# Patient Record
Sex: FEMALE | Race: WHITE | Employment: UNEMPLOYED
[De-identification: names, ages, dates, MRNs, and addresses within clinical notes are randomized per-mention and may not be internally consistent; named-entity substitution may affect disease eponyms.]

---

## 2017-02-06 ENCOUNTER — OFFICE VISIT (OUTPATIENT)
Dept: FAMILY MEDICINE CLINIC | Facility: CLINIC | Age: 72
End: 2017-02-06

## 2017-02-06 VITALS
DIASTOLIC BLOOD PRESSURE: 80 MMHG | TEMPERATURE: 98 F | HEART RATE: 82 BPM | SYSTOLIC BLOOD PRESSURE: 130 MMHG | WEIGHT: 181.2 LBS | BODY MASS INDEX: 33.34 KG/M2 | HEIGHT: 62 IN | OXYGEN SATURATION: 96 %

## 2017-02-06 DIAGNOSIS — E78.5 HYPERLIPIDEMIA, UNSPECIFIED HYPERLIPIDEMIA TYPE: ICD-10-CM

## 2017-02-06 DIAGNOSIS — Z79.4 TYPE 2 DIABETES MELLITUS WITH COMPLICATION, WITH LONG-TERM CURRENT USE OF INSULIN (HCC): Primary | ICD-10-CM

## 2017-02-06 DIAGNOSIS — Z12.11 COLON CANCER SCREENING: ICD-10-CM

## 2017-02-06 DIAGNOSIS — Z12.11 SCREENING FOR COLON CANCER: ICD-10-CM

## 2017-02-06 DIAGNOSIS — F41.9 ANXIETY: ICD-10-CM

## 2017-02-06 DIAGNOSIS — J44.9 CHRONIC OBSTRUCTIVE PULMONARY DISEASE, UNSPECIFIED COPD TYPE (HCC): ICD-10-CM

## 2017-02-06 DIAGNOSIS — M85.80 OTHER SPECIFIED DISORDERS OF BONE DENSITY AND STRUCTURE, UNSPECIFIED SITE: ICD-10-CM

## 2017-02-06 DIAGNOSIS — E11.8 TYPE 2 DIABETES MELLITUS WITH COMPLICATION, WITH LONG-TERM CURRENT USE OF INSULIN (HCC): Primary | ICD-10-CM

## 2017-02-06 DIAGNOSIS — M19.90 OSTEOARTHRITIS, UNSPECIFIED OSTEOARTHRITIS TYPE, UNSPECIFIED SITE: ICD-10-CM

## 2017-02-06 PROCEDURE — 1036F TOBACCO NON-USER: CPT | Performed by: PHYSICIAN ASSISTANT

## 2017-02-06 PROCEDURE — 1090F PRES/ABSN URINE INCON ASSESS: CPT | Performed by: PHYSICIAN ASSISTANT

## 2017-02-06 PROCEDURE — 3045F PR MOST RECENT HEMOGLOBIN A1C LEVEL 7.0-9.0%: CPT | Performed by: PHYSICIAN ASSISTANT

## 2017-02-06 PROCEDURE — 3014F SCREEN MAMMO DOC REV: CPT | Performed by: PHYSICIAN ASSISTANT

## 2017-02-06 PROCEDURE — 3017F COLORECTAL CA SCREEN DOC REV: CPT | Performed by: PHYSICIAN ASSISTANT

## 2017-02-06 PROCEDURE — 4040F PNEUMOC VAC/ADMIN/RCVD: CPT | Performed by: PHYSICIAN ASSISTANT

## 2017-02-06 PROCEDURE — G8419 CALC BMI OUT NRM PARAM NOF/U: HCPCS | Performed by: PHYSICIAN ASSISTANT

## 2017-02-06 PROCEDURE — 1123F ACP DISCUSS/DSCN MKR DOCD: CPT | Performed by: PHYSICIAN ASSISTANT

## 2017-02-06 PROCEDURE — 3023F SPIROM DOC REV: CPT | Performed by: PHYSICIAN ASSISTANT

## 2017-02-06 PROCEDURE — G8926 SPIRO NO PERF OR DOC: HCPCS | Performed by: PHYSICIAN ASSISTANT

## 2017-02-06 PROCEDURE — 99214 OFFICE O/P EST MOD 30 MIN: CPT | Performed by: PHYSICIAN ASSISTANT

## 2017-02-06 PROCEDURE — G8400 PT W/DXA NO RESULTS DOC: HCPCS | Performed by: PHYSICIAN ASSISTANT

## 2017-02-06 PROCEDURE — G8427 DOCREV CUR MEDS BY ELIG CLIN: HCPCS | Performed by: PHYSICIAN ASSISTANT

## 2017-02-06 PROCEDURE — G8484 FLU IMMUNIZE NO ADMIN: HCPCS | Performed by: PHYSICIAN ASSISTANT

## 2017-02-06 RX ORDER — LORAZEPAM 0.5 MG/1
0.5 TABLET ORAL EVERY 8 HOURS PRN
Qty: 30 TABLET | Refills: 2 | Status: SHIPPED | OUTPATIENT
Start: 2017-02-06 | End: 2017-09-18 | Stop reason: SDUPTHER

## 2017-02-06 RX ORDER — TRAMADOL HYDROCHLORIDE 50 MG/1
TABLET ORAL
Qty: 30 TABLET | Refills: 2 | Status: SHIPPED | OUTPATIENT
Start: 2017-02-06 | End: 2017-09-18 | Stop reason: SDUPTHER

## 2017-02-06 ASSESSMENT — ENCOUNTER SYMPTOMS
BLURRED VISION: 0
DIARRHEA: 0
CHEST TIGHTNESS: 0
NAUSEA: 0
ABDOMINAL PAIN: 0
VOMITING: 0
SHORTNESS OF BREATH: 0
EYE ITCHING: 0
EYE DISCHARGE: 0
RHINORRHEA: 0
SORE THROAT: 0
COUGH: 0
VISUAL CHANGE: 0
SINUS PRESSURE: 0

## 2017-02-06 ASSESSMENT — PATIENT HEALTH QUESTIONNAIRE - PHQ9
2. FEELING DOWN, DEPRESSED OR HOPELESS: 0
SUM OF ALL RESPONSES TO PHQ QUESTIONS 1-9: 0
1. LITTLE INTEREST OR PLEASURE IN DOING THINGS: 0
SUM OF ALL RESPONSES TO PHQ9 QUESTIONS 1 & 2: 0

## 2017-02-27 ENCOUNTER — TELEPHONE (OUTPATIENT)
Dept: FAMILY MEDICINE CLINIC | Facility: CLINIC | Age: 72
End: 2017-02-27

## 2017-03-28 DIAGNOSIS — Z12.11 COLON CANCER SCREENING: Primary | ICD-10-CM

## 2017-03-29 ENCOUNTER — TELEPHONE (OUTPATIENT)
Dept: FAMILY MEDICINE CLINIC | Age: 72
End: 2017-03-29

## 2017-03-29 DIAGNOSIS — Z12.11 COLON CANCER SCREENING: Primary | ICD-10-CM

## 2017-04-21 DIAGNOSIS — F41.9 ANXIETY: ICD-10-CM

## 2017-04-21 DIAGNOSIS — M85.80 OTHER SPECIFIED DISORDERS OF BONE DENSITY AND STRUCTURE, UNSPECIFIED SITE: ICD-10-CM

## 2017-04-21 DIAGNOSIS — J44.9 CHRONIC OBSTRUCTIVE PULMONARY DISEASE, UNSPECIFIED COPD TYPE (HCC): ICD-10-CM

## 2017-04-21 DIAGNOSIS — E78.5 HYPERLIPIDEMIA, UNSPECIFIED HYPERLIPIDEMIA TYPE: ICD-10-CM

## 2017-04-21 DIAGNOSIS — E11.8 TYPE 2 DIABETES MELLITUS WITH COMPLICATION, WITH LONG-TERM CURRENT USE OF INSULIN (HCC): ICD-10-CM

## 2017-04-21 DIAGNOSIS — Z79.4 TYPE 2 DIABETES MELLITUS WITH COMPLICATION, WITH LONG-TERM CURRENT USE OF INSULIN (HCC): ICD-10-CM

## 2017-04-21 DIAGNOSIS — Z12.11 COLON CANCER SCREENING: ICD-10-CM

## 2017-04-21 DIAGNOSIS — M19.90 OSTEOARTHRITIS, UNSPECIFIED OSTEOARTHRITIS TYPE, UNSPECIFIED SITE: ICD-10-CM

## 2017-04-21 LAB
ALBUMIN SERPL-MCNC: 4 G/DL
ALP BLD-CCNC: 59 U/L
ALT SERPL-CCNC: 21 U/L
AST SERPL-CCNC: 11 U/L
BASOPHILS ABSOLUTE: 0.1 /ΜL
BASOPHILS RELATIVE PERCENT: 1 %
BILIRUB SERPL-MCNC: 0.2 MG/DL (ref 0.1–1.4)
BUN BLDV-MCNC: 22 MG/DL
CALCIUM SERPL-MCNC: 8.7 MG/DL
CHLORIDE BLD-SCNC: 108 MMOL/L
CHOLESTEROL, TOTAL: 182 MG/DL
CHOLESTEROL/HDL RATIO: 4.23
CO2: 22 MMOL/L
CREAT SERPL-MCNC: 0.93 MG/DL
EOSINOPHILS ABSOLUTE: 0.2 /ΜL
EOSINOPHILS RELATIVE PERCENT: 2.5 %
GFR CALCULATED: 60
GLUCOSE BLD-MCNC: 211 MG/DL
HBA1C MFR BLD: 8.1 %
HCT VFR BLD CALC: 41.1 % (ref 36–46)
HDLC SERPL-MCNC: 43 MG/DL (ref 35–70)
HEMOGLOBIN: 13.9 G/DL (ref 12–16)
LDL CHOLESTEROL CALCULATED: 84 MG/DL (ref 0–160)
LYMPHOCYTES ABSOLUTE: 3.2 /ΜL
LYMPHOCYTES RELATIVE PERCENT: 39 %
MCH RBC QN AUTO: 31.6 PG
MCHC RBC AUTO-ENTMCNC: 33.8 G/DL
MCV RBC AUTO: 93.5 FL
MONOCYTES ABSOLUTE: 0.6 /ΜL
MONOCYTES RELATIVE PERCENT: 7.5 %
NEUTROPHILS ABSOLUTE: 4.1 /ΜL
NEUTROPHILS RELATIVE PERCENT: 49.7 %
PDW BLD-RTO: 14 %
PLATELET # BLD: 251 K/ΜL
PMV BLD AUTO: 8 FL
POTASSIUM SERPL-SCNC: 4.3 MMOL/L
RBC # BLD: 4.4 10^6/ΜL
SODIUM BLD-SCNC: 139 MMOL/L
TOTAL PROTEIN: 7.1
TRIGL SERPL-MCNC: 273 MG/DL
TSH SERPL DL<=0.05 MIU/L-ACNC: 1.64 UIU/ML
VLDLC SERPL CALC-MCNC: 54 MG/DL
WBC # BLD: 8.2 10^3/ML

## 2017-06-12 ENCOUNTER — OFFICE VISIT (OUTPATIENT)
Dept: FAMILY MEDICINE CLINIC | Age: 72
End: 2017-06-12
Payer: MEDICARE

## 2017-06-12 VITALS
HEIGHT: 62 IN | SYSTOLIC BLOOD PRESSURE: 126 MMHG | BODY MASS INDEX: 32 KG/M2 | OXYGEN SATURATION: 96 % | TEMPERATURE: 98.1 F | HEART RATE: 74 BPM | WEIGHT: 173.9 LBS | DIASTOLIC BLOOD PRESSURE: 82 MMHG

## 2017-06-12 DIAGNOSIS — F41.9 ANXIETY: ICD-10-CM

## 2017-06-12 DIAGNOSIS — Z23 NEED FOR STREPTOCOCCUS PNEUMONIAE VACCINATION: Primary | ICD-10-CM

## 2017-06-12 DIAGNOSIS — J44.9 CHRONIC OBSTRUCTIVE PULMONARY DISEASE, UNSPECIFIED COPD TYPE (HCC): ICD-10-CM

## 2017-06-12 DIAGNOSIS — E78.5 HYPERLIPIDEMIA, UNSPECIFIED HYPERLIPIDEMIA TYPE: ICD-10-CM

## 2017-06-12 PROCEDURE — G8417 CALC BMI ABV UP PARAM F/U: HCPCS | Performed by: PHYSICIAN ASSISTANT

## 2017-06-12 PROCEDURE — 1036F TOBACCO NON-USER: CPT | Performed by: PHYSICIAN ASSISTANT

## 2017-06-12 PROCEDURE — G8400 PT W/DXA NO RESULTS DOC: HCPCS | Performed by: PHYSICIAN ASSISTANT

## 2017-06-12 PROCEDURE — 3023F SPIROM DOC REV: CPT | Performed by: PHYSICIAN ASSISTANT

## 2017-06-12 PROCEDURE — 90670 PCV13 VACCINE IM: CPT | Performed by: PHYSICIAN ASSISTANT

## 2017-06-12 PROCEDURE — G8926 SPIRO NO PERF OR DOC: HCPCS | Performed by: PHYSICIAN ASSISTANT

## 2017-06-12 PROCEDURE — 4040F PNEUMOC VAC/ADMIN/RCVD: CPT | Performed by: PHYSICIAN ASSISTANT

## 2017-06-12 PROCEDURE — 1123F ACP DISCUSS/DSCN MKR DOCD: CPT | Performed by: PHYSICIAN ASSISTANT

## 2017-06-12 PROCEDURE — 3046F HEMOGLOBIN A1C LEVEL >9.0%: CPT | Performed by: PHYSICIAN ASSISTANT

## 2017-06-12 PROCEDURE — 3014F SCREEN MAMMO DOC REV: CPT | Performed by: PHYSICIAN ASSISTANT

## 2017-06-12 PROCEDURE — 99214 OFFICE O/P EST MOD 30 MIN: CPT | Performed by: PHYSICIAN ASSISTANT

## 2017-06-12 PROCEDURE — 1090F PRES/ABSN URINE INCON ASSESS: CPT | Performed by: PHYSICIAN ASSISTANT

## 2017-06-12 PROCEDURE — G0009 ADMIN PNEUMOCOCCAL VACCINE: HCPCS | Performed by: PHYSICIAN ASSISTANT

## 2017-06-12 PROCEDURE — G8427 DOCREV CUR MEDS BY ELIG CLIN: HCPCS | Performed by: PHYSICIAN ASSISTANT

## 2017-06-12 PROCEDURE — 3017F COLORECTAL CA SCREEN DOC REV: CPT | Performed by: PHYSICIAN ASSISTANT

## 2017-06-12 ASSESSMENT — ENCOUNTER SYMPTOMS
CHEST TIGHTNESS: 0
COUGH: 0
NAUSEA: 0
VOMITING: 0
RHINORRHEA: 0
EYE DISCHARGE: 0
SHORTNESS OF BREATH: 0
SORE THROAT: 0
BLURRED VISION: 0
ABDOMINAL PAIN: 0
DIARRHEA: 0
EYE ITCHING: 0
SINUS PRESSURE: 0
VISUAL CHANGE: 0

## 2017-09-18 ENCOUNTER — OFFICE VISIT (OUTPATIENT)
Dept: FAMILY MEDICINE CLINIC | Age: 72
End: 2017-09-18
Payer: MEDICARE

## 2017-09-18 VITALS
TEMPERATURE: 97 F | SYSTOLIC BLOOD PRESSURE: 130 MMHG | DIASTOLIC BLOOD PRESSURE: 80 MMHG | HEART RATE: 80 BPM | OXYGEN SATURATION: 97 % | HEIGHT: 61 IN | BODY MASS INDEX: 33.04 KG/M2 | WEIGHT: 175 LBS

## 2017-09-18 DIAGNOSIS — Z12.31 ENCOUNTER FOR SCREENING MAMMOGRAM FOR MALIGNANT NEOPLASM OF BREAST: ICD-10-CM

## 2017-09-18 DIAGNOSIS — E78.5 HYPERLIPIDEMIA, UNSPECIFIED HYPERLIPIDEMIA TYPE: ICD-10-CM

## 2017-09-18 DIAGNOSIS — Z79.4 TYPE 2 DIABETES MELLITUS WITH COMPLICATION, WITH LONG-TERM CURRENT USE OF INSULIN (HCC): Primary | ICD-10-CM

## 2017-09-18 DIAGNOSIS — F41.9 ANXIETY: ICD-10-CM

## 2017-09-18 DIAGNOSIS — E11.8 TYPE 2 DIABETES MELLITUS WITH COMPLICATION, WITH LONG-TERM CURRENT USE OF INSULIN (HCC): Primary | ICD-10-CM

## 2017-09-18 DIAGNOSIS — J44.9 CHRONIC OBSTRUCTIVE PULMONARY DISEASE, UNSPECIFIED COPD TYPE (HCC): ICD-10-CM

## 2017-09-18 DIAGNOSIS — Z12.39 SCREENING FOR BREAST CANCER: ICD-10-CM

## 2017-09-18 DIAGNOSIS — Z79.899 CHRONIC PRESCRIPTION BENZODIAZEPINE USE: ICD-10-CM

## 2017-09-18 DIAGNOSIS — M19.90 OSTEOARTHRITIS, UNSPECIFIED OSTEOARTHRITIS TYPE, UNSPECIFIED SITE: ICD-10-CM

## 2017-09-18 DIAGNOSIS — Z23 NEED FOR INFLUENZA VACCINATION: ICD-10-CM

## 2017-09-18 DIAGNOSIS — Z13.820 SCREENING FOR OSTEOPOROSIS: ICD-10-CM

## 2017-09-18 DIAGNOSIS — E11.9 ENCOUNTER FOR DIABETIC FOOT EXAM (HCC): ICD-10-CM

## 2017-09-18 LAB
AMPHETAMINE SCREEN, URINE: NORMAL
BARBITURATE SCREEN, URINE: NORMAL
BENZODIAZEPINE SCREEN, URINE: NORMAL
COCAINE METABOLITE SCREEN URINE: NORMAL
CREATININE URINE POCT: 50
HBA1C MFR BLD: 7.7 %
MDMA URINE: NORMAL
METHADONE SCREEN, URINE: NORMAL
METHAMPHETAMINE, URINE: NORMAL
MICROALBUMIN/CREAT 24H UR: 30 MG/G{CREAT}
MICROALBUMIN/CREAT UR-RTO: ABNORMAL
OPIATE SCREEN URINE: NORMAL
OXYCODONE SCREEN URINE: NORMAL
PHENCYCLIDINE SCREEN URINE: NORMAL
PROPOXYPHENE SCREEN, URINE: NORMAL
THC: NORMAL
TRICYCLIC ANTIDEPRESSANTS, UR: NORMAL

## 2017-09-18 PROCEDURE — G0008 ADMIN INFLUENZA VIRUS VAC: HCPCS | Performed by: PHYSICIAN ASSISTANT

## 2017-09-18 PROCEDURE — 3014F SCREEN MAMMO DOC REV: CPT | Performed by: PHYSICIAN ASSISTANT

## 2017-09-18 PROCEDURE — 3023F SPIROM DOC REV: CPT | Performed by: PHYSICIAN ASSISTANT

## 2017-09-18 PROCEDURE — 1123F ACP DISCUSS/DSCN MKR DOCD: CPT | Performed by: PHYSICIAN ASSISTANT

## 2017-09-18 PROCEDURE — G8400 PT W/DXA NO RESULTS DOC: HCPCS | Performed by: PHYSICIAN ASSISTANT

## 2017-09-18 PROCEDURE — 3017F COLORECTAL CA SCREEN DOC REV: CPT | Performed by: PHYSICIAN ASSISTANT

## 2017-09-18 PROCEDURE — 99214 OFFICE O/P EST MOD 30 MIN: CPT | Performed by: PHYSICIAN ASSISTANT

## 2017-09-18 PROCEDURE — G8427 DOCREV CUR MEDS BY ELIG CLIN: HCPCS | Performed by: PHYSICIAN ASSISTANT

## 2017-09-18 PROCEDURE — 90662 IIV NO PRSV INCREASED AG IM: CPT | Performed by: PHYSICIAN ASSISTANT

## 2017-09-18 PROCEDURE — 3046F HEMOGLOBIN A1C LEVEL >9.0%: CPT | Performed by: PHYSICIAN ASSISTANT

## 2017-09-18 PROCEDURE — 1036F TOBACCO NON-USER: CPT | Performed by: PHYSICIAN ASSISTANT

## 2017-09-18 PROCEDURE — 82044 UR ALBUMIN SEMIQUANTITATIVE: CPT | Performed by: PHYSICIAN ASSISTANT

## 2017-09-18 PROCEDURE — 1090F PRES/ABSN URINE INCON ASSESS: CPT | Performed by: PHYSICIAN ASSISTANT

## 2017-09-18 PROCEDURE — 4040F PNEUMOC VAC/ADMIN/RCVD: CPT | Performed by: PHYSICIAN ASSISTANT

## 2017-09-18 PROCEDURE — G8417 CALC BMI ABV UP PARAM F/U: HCPCS | Performed by: PHYSICIAN ASSISTANT

## 2017-09-18 PROCEDURE — G8926 SPIRO NO PERF OR DOC: HCPCS | Performed by: PHYSICIAN ASSISTANT

## 2017-09-18 PROCEDURE — 83036 HEMOGLOBIN GLYCOSYLATED A1C: CPT | Performed by: PHYSICIAN ASSISTANT

## 2017-09-18 PROCEDURE — 80305 DRUG TEST PRSMV DIR OPT OBS: CPT | Performed by: PHYSICIAN ASSISTANT

## 2017-09-18 RX ORDER — LORAZEPAM 0.5 MG/1
0.5 TABLET ORAL EVERY 8 HOURS PRN
Qty: 30 TABLET | Refills: 2 | Status: SHIPPED | OUTPATIENT
Start: 2017-09-18 | End: 2017-12-11 | Stop reason: SDUPTHER

## 2017-09-18 RX ORDER — TRAMADOL HYDROCHLORIDE 50 MG/1
TABLET ORAL
Qty: 30 TABLET | Refills: 2 | Status: SHIPPED | OUTPATIENT
Start: 2017-09-18 | End: 2017-12-11 | Stop reason: SDUPTHER

## 2017-09-18 ASSESSMENT — ENCOUNTER SYMPTOMS
DIARRHEA: 0
VOMITING: 0
EYE DISCHARGE: 0
SORE THROAT: 0
RHINORRHEA: 0
SINUS PRESSURE: 0
CHEST TIGHTNESS: 0
EYE ITCHING: 0
VISUAL CHANGE: 0
NAUSEA: 0
COUGH: 0
ABDOMINAL PAIN: 0
SHORTNESS OF BREATH: 0
BLURRED VISION: 0

## 2017-09-19 ENCOUNTER — TELEPHONE (OUTPATIENT)
Dept: FAMILY MEDICINE CLINIC | Age: 72
End: 2017-09-19

## 2017-09-19 NOTE — TELEPHONE ENCOUNTER
[facility] Rep, called about [facility] , DEXA Scan you have to re write the order.   She will call back on Wednesday to give details

## 2017-09-27 DIAGNOSIS — Z13.820 SCREENING FOR OSTEOPOROSIS: ICD-10-CM

## 2017-09-27 DIAGNOSIS — Z12.31 ENCOUNTER FOR SCREENING MAMMOGRAM FOR MALIGNANT NEOPLASM OF BREAST: ICD-10-CM

## 2017-09-27 DIAGNOSIS — Z12.39 SCREENING FOR BREAST CANCER: ICD-10-CM

## 2017-11-21 RX ORDER — PEN NEEDLE, DIABETIC 32GX 5/32"
NEEDLE, DISPOSABLE MISCELLANEOUS
Qty: 90 EACH | Refills: 11 | Status: SHIPPED | OUTPATIENT
Start: 2017-11-21 | End: 2018-12-31 | Stop reason: SDUPTHER

## 2017-12-11 ENCOUNTER — OFFICE VISIT (OUTPATIENT)
Dept: FAMILY MEDICINE CLINIC | Age: 72
End: 2017-12-11
Payer: MEDICARE

## 2017-12-11 VITALS
TEMPERATURE: 97.2 F | SYSTOLIC BLOOD PRESSURE: 126 MMHG | DIASTOLIC BLOOD PRESSURE: 80 MMHG | HEIGHT: 62 IN | WEIGHT: 165 LBS | BODY MASS INDEX: 30.36 KG/M2 | OXYGEN SATURATION: 97 % | HEART RATE: 86 BPM

## 2017-12-11 DIAGNOSIS — E78.5 HYPERLIPIDEMIA, UNSPECIFIED HYPERLIPIDEMIA TYPE: ICD-10-CM

## 2017-12-11 DIAGNOSIS — J44.9 CHRONIC OBSTRUCTIVE PULMONARY DISEASE, UNSPECIFIED COPD TYPE (HCC): Primary | ICD-10-CM

## 2017-12-11 DIAGNOSIS — F41.9 ANXIETY: ICD-10-CM

## 2017-12-11 DIAGNOSIS — E55.9 VITAMIN D DEFICIENCY: ICD-10-CM

## 2017-12-11 DIAGNOSIS — M19.90 OSTEOARTHRITIS, UNSPECIFIED OSTEOARTHRITIS TYPE, UNSPECIFIED SITE: ICD-10-CM

## 2017-12-11 LAB — HBA1C MFR BLD: 8.4 %

## 2017-12-11 PROCEDURE — G8427 DOCREV CUR MEDS BY ELIG CLIN: HCPCS | Performed by: PHYSICIAN ASSISTANT

## 2017-12-11 PROCEDURE — 3045F PR MOST RECENT HEMOGLOBIN A1C LEVEL 7.0-9.0%: CPT | Performed by: PHYSICIAN ASSISTANT

## 2017-12-11 PROCEDURE — 1036F TOBACCO NON-USER: CPT | Performed by: PHYSICIAN ASSISTANT

## 2017-12-11 PROCEDURE — 3017F COLORECTAL CA SCREEN DOC REV: CPT | Performed by: PHYSICIAN ASSISTANT

## 2017-12-11 PROCEDURE — 1123F ACP DISCUSS/DSCN MKR DOCD: CPT | Performed by: PHYSICIAN ASSISTANT

## 2017-12-11 PROCEDURE — 99214 OFFICE O/P EST MOD 30 MIN: CPT | Performed by: PHYSICIAN ASSISTANT

## 2017-12-11 PROCEDURE — 1090F PRES/ABSN URINE INCON ASSESS: CPT | Performed by: PHYSICIAN ASSISTANT

## 2017-12-11 PROCEDURE — 3023F SPIROM DOC REV: CPT | Performed by: PHYSICIAN ASSISTANT

## 2017-12-11 PROCEDURE — 83036 HEMOGLOBIN GLYCOSYLATED A1C: CPT | Performed by: PHYSICIAN ASSISTANT

## 2017-12-11 PROCEDURE — G8417 CALC BMI ABV UP PARAM F/U: HCPCS | Performed by: PHYSICIAN ASSISTANT

## 2017-12-11 PROCEDURE — 4040F PNEUMOC VAC/ADMIN/RCVD: CPT | Performed by: PHYSICIAN ASSISTANT

## 2017-12-11 PROCEDURE — G8399 PT W/DXA RESULTS DOCUMENT: HCPCS | Performed by: PHYSICIAN ASSISTANT

## 2017-12-11 PROCEDURE — G8926 SPIRO NO PERF OR DOC: HCPCS | Performed by: PHYSICIAN ASSISTANT

## 2017-12-11 PROCEDURE — G8484 FLU IMMUNIZE NO ADMIN: HCPCS | Performed by: PHYSICIAN ASSISTANT

## 2017-12-11 PROCEDURE — 3014F SCREEN MAMMO DOC REV: CPT | Performed by: PHYSICIAN ASSISTANT

## 2017-12-11 RX ORDER — LORAZEPAM 0.5 MG/1
0.5 TABLET ORAL EVERY 8 HOURS PRN
Qty: 30 TABLET | Refills: 0 | Status: SHIPPED | OUTPATIENT
Start: 2017-12-11 | End: 2018-03-19 | Stop reason: SDUPTHER

## 2017-12-11 RX ORDER — TRAMADOL HYDROCHLORIDE 50 MG/1
TABLET ORAL
Qty: 30 TABLET | Refills: 0 | Status: SHIPPED | OUTPATIENT
Start: 2017-12-11 | End: 2018-08-20 | Stop reason: SDUPTHER

## 2017-12-11 RX ORDER — LISINOPRIL 5 MG/1
5 TABLET ORAL
COMMUNITY
Start: 2017-11-21 | End: 2017-12-11

## 2017-12-11 ASSESSMENT — ENCOUNTER SYMPTOMS
ABDOMINAL PAIN: 0
VOMITING: 0
COUGH: 0
SINUS PRESSURE: 0
SORE THROAT: 0
RHINORRHEA: 0
CHEST TIGHTNESS: 0
NAUSEA: 0
DIARRHEA: 0
BLURRED VISION: 0
VISUAL CHANGE: 0
SHORTNESS OF BREATH: 0
EYE DISCHARGE: 0
EYE ITCHING: 0

## 2017-12-11 NOTE — PROGRESS NOTES
[address]  [address] [address]  Dept: [phone]  Dept Fax: [phone]    Roberto Barber is a 67 y.o. female who presents today for her medical conditions/complaints as noted below. Roberto Barber is c/o of   Chief Complaint   Patient presents with    Diabetes     foot exam     Visit Information    Have you changed or started any medications since your last visit including any over-the-counter medicines, vitamins, or herbal medicines? no   Have you stopped taking any of your medications? Is so, why? -  no  Are you having any side effects from any of your medications? - no    Have you seen any other physician or provider since your last visit?  no   Have you had any other diagnostic tests since your last visit?  no   Have you been seen in the emergency room and/or had an admission in a hospital since we last saw you?  no   Have you had your routine dental cleaning in the past 6 months?  no     Do you have an active MyChart account? If no, what is the barrier?   No:     Patient Care Team:  Lorie Buck PA-C as PCP - General (Physician Assistant)  Lorie Buck PA-C as PCP - [facility] Attributed Provider    Medical History Review  Past Medical, Family, and Social History reviewed and does contribute to the patient presenting condition    Health Maintenance   Topic Date Due    DTaP/Tdap/Td vaccine (1 - Tdap) 11/22/1963    Diabetic retinal exam  12/07/2017    Colon cancer screen colonoscopy  04/05/2018 (Originally 11/22/1994)    Diabetic foot exam  12/13/2023 (Originally 7/7/2016)    Lipid screen  04/21/2018    Diabetic hemoglobin A1C test  09/18/2018    Diabetic microalbuminuria test  09/18/2018    Breast cancer screen  09/27/2019    Zostavax vaccine  Completed    DEXA (modify frequency per FRAX score)  Completed    Flu vaccine  Completed    Pneumococcal low/med risk  Completed               HPI:     Diabetes   She presents for her follow-up diabetic visit. She has type 2 diabetes mellitus. Pertinent negatives for hypoglycemia include no confusion, dizziness, headaches, hunger, mood changes, nervousness/anxiousness, pallor, seizures, sleepiness, speech difficulty, sweats or tremors. Pertinent negatives for diabetes include no blurred vision, no chest pain, no fatigue, no foot paresthesias, no foot ulcerations, no polydipsia, no polyphagia, no polyuria, no visual change, no weakness and no weight loss. Pertinent negatives for hypoglycemia complications include no blackouts, no hospitalization, no nocturnal hypoglycemia and no required assistance. Pertinent negatives for diabetic complications include no autonomic neuropathy or CVA. Her breakfast blood glucose range is generally 180-200 mg/dl. Mental Health Problem   This is a chronic problem. Additional symptoms of the illness do not include anhedonia, insomnia, fatigue, psychomotor retardation, feelings of worthlessness, visual change, headaches, abdominal pain or seizures. She does not admit to suicidal ideas. She does not have a plan to commit suicide. She does not contemplate harming herself. She has not already injured self. She does not contemplate injuring another person. She has not already  injured another person. Hyperlipidemia   This is a chronic problem. Exacerbating diseases include diabetes and obesity. She has no history of chronic renal disease. Pertinent negatives include no chest pain, focal sensory loss, focal weakness, leg pain or shortness of breath.        Hemoglobin A1C (%)   Date Value   09/18/2017 7.7   04/21/2017 8.1   08/17/2016 8.6             ( goal A1C is < 7)   No results found for: LABMICR  LDL Calculated (mg/dL)   Date Value   04/21/2017 84   08/29/2016 63       (goal LDL is <100)   AST (U/L)   Date Value   04/21/2017 11     ALT (U/L)   Date Value   04/21/2017 21     BUN (mg/dL)   Date Value   04/21/2017 22     BP Readings from Last 3 Encounters: 12/11/17 126/80   09/18/17 130/80   06/12/17 126/82          (goal 120/80)    Past Medical History:   Diagnosis Date    Anxiety     Hyperlipidemia     Osteoarthritis     Type II or unspecified type diabetes mellitus without mention of complication, not stated as uncontrolled       Past Surgical History:   Procedure Laterality Date    CARDIAC SURGERY      KNEE SURGERY      TONSILLECTOMY         Family History   Problem Relation Age of Onset    Cancer Mother      hodgkins    Diabetes Father        Social History   Substance Use Topics    Smoking status: Former Smoker    Smokeless tobacco: Never Used    Alcohol use No      Current Outpatient Prescriptions   Medication Sig Dispense Refill    LORazepam (ATIVAN) 0.5 MG tablet Take 1 tablet by mouth every 8 hours as needed for Anxiety . 30 tablet 0    traMADol (ULTRAM) 50 MG tablet 1 tablets q 12 hrs prn. 30 tablet 0    insulin glargine (TOUJEO SOLOSTAR) 300 UNIT/ML injection pen Inject 24 Units into the skin nightly 3 pen 3    glucose blood VI test strips (ONE TOUCH ULTRA TEST) strip 1 each by Does not apply route 3 times daily As needed.  100 strip 11    BD PEN NEEDLE LJ U/F 32G X 4 MM MISC USE EVERY DAY 90 each 11    metFORMIN (GLUCOPHAGE) 1000 MG tablet TAKE 1 TABLET TWICE DAILY 180 tablet 1    ONE TOUCH LANCETS MISC 1 each by Does not apply route 3 times daily 100 each 11    glucose blood VI test strips (ASCENSIA AUTODISC VI;ONE TOUCH ULTRA TEST VI) strip 1 each by In Vitro route daily Dx:Uncontrolled  each 11    Insulin Pen Needle (B-D ULTRAFINE III SHORT PEN) 31G X 8 MM MISC Inject 1 each into the skin daily May substitute with any brand 100 each 11    pravastatin (PRAVACHOL) 80 MG tablet Take 80 mg by mouth daily       Blood Glucose Monitoring Suppl KIT Please dispense machine with lancets and testing strips, her diagnoses code is 105.99 1 kit 0    folic acid (FOLVITE) 1 MG tablet Take 1 mg by mouth daily      glucose monitoring kit (FREESTYLE) monitoring kit 1 kit by Does not apply route daily as needed. 1 kit 0    carvedilol (COREG) 12.5 MG tablet Take 1 tablet by mouth 2 times daily (with meals).  digoxin (LANOXIN) 125 MCG tablet Take 1 tablet by mouth daily.  lisinopril (PRINIVIL;ZESTRIL) 5 MG tablet Take 1 tablet by mouth 2 times daily. No current facility-administered medications for this visit. Allergies   Allergen Reactions    Nicotine     Sulfa Antibiotics        Health Maintenance   Topic Date Due    DTaP/Tdap/Td vaccine (1 - Tdap) 11/22/1963    Diabetic retinal exam  12/07/2017    Colon cancer screen colonoscopy  04/05/2018 (Originally 11/22/1994)    Diabetic foot exam  12/13/2023 (Originally 7/7/2016)    Lipid screen  04/21/2018    Diabetic hemoglobin A1C test  09/18/2018    Diabetic microalbuminuria test  09/18/2018    Breast cancer screen  09/27/2019    Zostavax vaccine  Completed    DEXA (modify frequency per FRAX score)  Completed    Flu vaccine  Completed    Pneumococcal low/med risk  Completed       Subjective:      Review of Systems   Constitutional: Negative for chills, diaphoresis, fatigue, fever and weight loss. HENT: Negative for congestion, ear discharge, ear pain, postnasal drip, rhinorrhea, sinus pressure and sore throat. Eyes: Negative for blurred vision, discharge and itching. Respiratory: Negative for cough, chest tightness and shortness of breath. Cardiovascular: Negative for chest pain, palpitations and leg swelling. Gastrointestinal: Negative for abdominal pain, diarrhea, nausea and vomiting. Endocrine: Negative for polydipsia, polyphagia and polyuria. Genitourinary: Negative for dysuria and frequency. Musculoskeletal: Negative for neck pain and neck stiffness. Skin: Negative for pallor and rash. Neurological: Negative for dizziness, tremors, focal weakness, seizures, speech difficulty, weakness, light-headedness, numbness and headaches. Psychiatric/Behavioral: Negative for confusion. The patient is not nervous/anxious and does not have insomnia. All other systems reviewed and are negative. Objective:     Physical Exam   Constitutional: She is oriented to person, place, and time. She appears well-developed and well-nourished. No distress. /80  Pulse 82  Temp 98 °F (36.7 °C) (Oral)   Ht 5' 2\" (1.575 m)  Wt 181 lb 3.2 oz (82.2 kg)  SpO2 96%  BMI 33.14 kg/m2     HENT:   Head: Normocephalic and atraumatic. Right Ear: External ear normal.   Left Ear: External ear normal.   Nose: Nose normal.   Mouth/Throat: Oropharynx is clear and moist.   Eyes: Conjunctivae and EOM are normal. Pupils are equal, round, and reactive to light. Right eye exhibits no discharge. Left eye exhibits no discharge. No scleral icterus. Neck: Normal range of motion. Neck supple. No tracheal deviation present. No thyromegaly present. Cardiovascular: Normal rate, regular rhythm and normal heart sounds. Exam reveals no gallop and no friction rub. No murmur heard. Pulmonary/Chest: Effort normal and breath sounds normal. No stridor. No respiratory distress. She has no wheezes. She has no rales. She exhibits no tenderness. Abdominal: Soft. Bowel sounds are normal. She exhibits no distension. There is no tenderness. There is no rebound and no guarding. Musculoskeletal: She exhibits no edema. Neurological: She is alert and oriented to person, place, and time. Gait normal.   Skin: Skin is warm and dry. No rash noted. She is not diaphoretic. Nail fungus    Psychiatric: She has a normal mood and affect. Her affect is not inappropriate. Nursing note and vitals reviewed.       Diabetic Foot Exam  Observation: normal  Sensation:   Monofilament Sensation:  normal    Light Touch: normal  Color:  normal  Pulses:  normal,   Edema: normal  Skin: normal    Nail fungus - Pt refusing tx    /80   Pulse 86   Temp 97.2 °F (36.2 °C) (Oral)   Ht 5' 2\" (1.575 m) hours as needed for Anxiety . Dispense:  30 tablet     Refill:  0    traMADol (ULTRAM) 50 MG tablet     Si tablets q 12 hrs prn. Dispense:  30 tablet     Refill:  0    insulin glargine (TOUJEO SOLOSTAR) 300 UNIT/ML injection pen     Sig: Inject 24 Units into the skin nightly     Dispense:  3 pen     Refill:  3     COPD - Recent bronchitis. Cxr showed COPD. No GHOTRA. Cough resolved.       HTN - Taking meds and tolerating well. Cont tx.      DM - Taking metformin. Was taking [place] and [place] however stopped d/t cost. States cannot afford it. A1C now 9.1. Add invokana - pt did not tolerate. Diet and exercise encouraged. A1C 8.7. Use to take insulin. Prefers to go on insulin vs expensive meds. Doing well. AM . On toujeo 22. A1C 8.1. A1C 7.7 17. Increase insulin to 24 units.      HLD - Cont meds. Diet and exercise encouraged. Will repeat and recheck.      Anxiety - On ativan prn.       OA - States prescribed ultram > 1 yr ago. Takes about 1 / mo. D/w pt concerns regarding this med and this in combination with ativan. Will rx few for very limited use.      Normal monofilament exam 16.      Pt refusing colonoscopy. Understands benefit      [facility] 2015 nl. New order given.      Flu through pharmacy 10/2016.      Pneumovax 23 1/20/15    Prevnar 13 - 17      Zostavax .     Tdap - 16      Pelvic - Pt states done with GYN exams    Microalb - 2017    Foot exam - Refusing      Patient given educational materials - see patient instructions. Discussed use, benefit, and side effects of prescribed medications. All patient questions answered. Pt voiced understanding. Reviewed health maintenance. Instructed to continue current medications, diet and exercise. Patient agreed with treatment plan. Follow up as directed.      Electronically signed by Lucinda Lundberg PA-C on 2017 at 9:46 AM

## 2017-12-11 NOTE — TELEPHONE ENCOUNTER
Here is the script to go to [facility].  I added icd 10 code into the notes as well as [place] brand that is covered is fine\"

## 2018-02-12 LAB
ALBUMIN: 4 G/DL (ref 3.5–5)
ALP BLD-CCNC: 67 U/L (ref 45–117)
ALT SERPL-CCNC: 27 U/L (ref 12–78)
AST SERPL-CCNC: 15 U/L (ref 15–37)
BASOPHILS ABSOLUTE: 0 10'3/UL (ref 0.1–0.2)
BASOPHILS RELATIVE PERCENT: 0.6 % (ref 0–1.7)
BILIRUB SERPL-MCNC: 0.2 MG/DL (ref 0–1)
BUN BLDV-MCNC: 19 MG/DL (ref 7–18)
CALCIUM SERPL-MCNC: 9 MG/DL (ref 8.5–10.1)
CHLORIDE BLD-SCNC: 104 MMOL/L (ref 97–107)
CHOLESTEROL/HDL RELATIVE RISK: 4.94
CHOLESTEROL: 173 MG/DL (ref 100–200)
CO2: 21 MMOL/L (ref 21–32)
CREAT SERPL-MCNC: 0.85 MG/DL (ref 0.55–1.02)
EOSINOPHILS ABSOLUTE: 0.2 10'3/UL (ref 0–0.4)
EOSINOPHILS RELATIVE PERCENT: 2.1 % (ref 0–6.4)
GFR CALCULATED: > 60
GLUCOSE: 223 MG/DL (ref 70–99)
HCT VFR BLD CALC: 41.5 % (ref 34.6–44.1)
HDLC SERPL-MCNC: 35 MG/DL (ref 45–60)
HEMOGLOBIN: 13.7 G/DL (ref 11.7–14.9)
LDL CHOLESTEROL: 65 MG/DL (ref 88–198)
LYMPHOCYTES ABSOLUTE: 2.3 10'3/UL (ref 0.5–3.5)
LYMPHOCYTES RELATIVE PERCENT: 28.6 % (ref 17.4–45.9)
MCH RBC QN AUTO: 31.1 PG (ref 27.8–33.2)
MCHC RBC AUTO-ENTMCNC: 33.1 G/DL (ref 32.7–34.8)
MCV RBC AUTO: 94 FL (ref 83–97.4)
MONOCYTES ABSOLUTE: 0.6 10'3/UL (ref 0.2–0.8)
MONOCYTES RELATIVE PERCENT: 7.1 % (ref 4.4–12)
NEUTROPHILS ABSOLUTE: 5 10'3/UL (ref 1.5–5.6)
NEUTROPHILS SEGMENTED: 61.6 % (ref 41.2–72.1)
PDW BLD-RTO: 14.1 % (ref 12.2–15.8)
PLATELET # BLD: 239 10'3/UL (ref 122–359)
PMV BLD AUTO: 8.3 FL (ref 7.6–10.6)
POTASSIUM SERPL-SCNC: 4.3 MMOL/L (ref 3.5–5.1)
RBC # BLD: 4.42 10'6/UL (ref 3.85–4.88)
SODIUM BLD-SCNC: 137 MMOL/L (ref 136–145)
T4 FREE: 1.1 NG/DL (ref 0.59–1.17)
TOTAL PROTEIN: 7.2 G/DL (ref 6.4–8.2)
TRIGL SERPL-MCNC: 364 MG/DL
TSH SERPL DL<=0.05 MIU/L-ACNC: 1.42 UIU/ML (ref 0.36–3.74)
VITAMIN D 25-HYDROXY: 40 NG/ML (ref 30–100)
VITAMIN D2, 25 HYDROXY: <4 NG/ML
VITAMIN D3,25 HYDROXY: 40 NG/ML
VLDLC SERPL CALC-MCNC: 72 MG/DL (ref 5–35)
WBC: 8.1 10'3/UL (ref 3.2–9.3)

## 2018-02-21 DIAGNOSIS — E55.9 VITAMIN D DEFICIENCY: ICD-10-CM

## 2018-03-19 ENCOUNTER — OFFICE VISIT (OUTPATIENT)
Dept: FAMILY MEDICINE CLINIC | Age: 73
End: 2018-03-19
Payer: MEDICARE

## 2018-03-19 VITALS
DIASTOLIC BLOOD PRESSURE: 82 MMHG | OXYGEN SATURATION: 97 % | WEIGHT: 178.5 LBS | HEIGHT: 62 IN | TEMPERATURE: 97.6 F | SYSTOLIC BLOOD PRESSURE: 124 MMHG | BODY MASS INDEX: 32.85 KG/M2 | HEART RATE: 76 BPM

## 2018-03-19 DIAGNOSIS — E78.5 HYPERLIPIDEMIA, UNSPECIFIED HYPERLIPIDEMIA TYPE: ICD-10-CM

## 2018-03-19 DIAGNOSIS — Z79.4 TYPE 2 DIABETES MELLITUS WITH COMPLICATION, WITH LONG-TERM CURRENT USE OF INSULIN (HCC): ICD-10-CM

## 2018-03-19 DIAGNOSIS — E55.9 VITAMIN D DEFICIENCY: ICD-10-CM

## 2018-03-19 DIAGNOSIS — E53.8 FOLATE DEFICIENCY: ICD-10-CM

## 2018-03-19 DIAGNOSIS — F41.9 ANXIETY: ICD-10-CM

## 2018-03-19 DIAGNOSIS — M19.90 OSTEOARTHRITIS, UNSPECIFIED OSTEOARTHRITIS TYPE, UNSPECIFIED SITE: ICD-10-CM

## 2018-03-19 DIAGNOSIS — E11.8 TYPE 2 DIABETES MELLITUS WITH COMPLICATION, WITH LONG-TERM CURRENT USE OF INSULIN (HCC): ICD-10-CM

## 2018-03-19 DIAGNOSIS — J44.9 CHRONIC OBSTRUCTIVE PULMONARY DISEASE, UNSPECIFIED COPD TYPE (HCC): ICD-10-CM

## 2018-03-19 PROCEDURE — G8399 PT W/DXA RESULTS DOCUMENT: HCPCS | Performed by: PHYSICIAN ASSISTANT

## 2018-03-19 PROCEDURE — 99214 OFFICE O/P EST MOD 30 MIN: CPT | Performed by: PHYSICIAN ASSISTANT

## 2018-03-19 PROCEDURE — 3046F HEMOGLOBIN A1C LEVEL >9.0%: CPT | Performed by: PHYSICIAN ASSISTANT

## 2018-03-19 PROCEDURE — 1123F ACP DISCUSS/DSCN MKR DOCD: CPT | Performed by: PHYSICIAN ASSISTANT

## 2018-03-19 PROCEDURE — G8427 DOCREV CUR MEDS BY ELIG CLIN: HCPCS | Performed by: PHYSICIAN ASSISTANT

## 2018-03-19 PROCEDURE — 3017F COLORECTAL CA SCREEN DOC REV: CPT | Performed by: PHYSICIAN ASSISTANT

## 2018-03-19 PROCEDURE — G8417 CALC BMI ABV UP PARAM F/U: HCPCS | Performed by: PHYSICIAN ASSISTANT

## 2018-03-19 PROCEDURE — 3014F SCREEN MAMMO DOC REV: CPT | Performed by: PHYSICIAN ASSISTANT

## 2018-03-19 PROCEDURE — G8926 SPIRO NO PERF OR DOC: HCPCS | Performed by: PHYSICIAN ASSISTANT

## 2018-03-19 PROCEDURE — 4040F PNEUMOC VAC/ADMIN/RCVD: CPT | Performed by: PHYSICIAN ASSISTANT

## 2018-03-19 PROCEDURE — 83036 HEMOGLOBIN GLYCOSYLATED A1C: CPT | Performed by: PHYSICIAN ASSISTANT

## 2018-03-19 PROCEDURE — 1036F TOBACCO NON-USER: CPT | Performed by: PHYSICIAN ASSISTANT

## 2018-03-19 PROCEDURE — 1090F PRES/ABSN URINE INCON ASSESS: CPT | Performed by: PHYSICIAN ASSISTANT

## 2018-03-19 PROCEDURE — 3023F SPIROM DOC REV: CPT | Performed by: PHYSICIAN ASSISTANT

## 2018-03-19 PROCEDURE — G8482 FLU IMMUNIZE ORDER/ADMIN: HCPCS | Performed by: PHYSICIAN ASSISTANT

## 2018-03-19 RX ORDER — LORAZEPAM 0.5 MG/1
0.5 TABLET ORAL EVERY 8 HOURS PRN
Qty: 30 TABLET | Refills: 0 | Status: SHIPPED | OUTPATIENT
Start: 2018-03-19 | End: 2018-04-18

## 2018-03-19 ASSESSMENT — ENCOUNTER SYMPTOMS
COUGH: 0
DIARRHEA: 0
EYE DISCHARGE: 0
VOMITING: 0
SINUS PRESSURE: 0
SHORTNESS OF BREATH: 0
NAUSEA: 0
SORE THROAT: 0
BLURRED VISION: 0
CHEST TIGHTNESS: 0
VISUAL CHANGE: 0
EYE ITCHING: 0
RHINORRHEA: 0
ABDOMINAL PAIN: 0

## 2018-03-19 NOTE — PROGRESS NOTES
[address]  [address] [address]  Dept: [phone]  Dept Fax: [phone]    Milly Cranker is a 67 y.o. female who presents today for her medical conditions/complaints as noted below. Milly Cranker is c/o of   Chief Complaint   Patient presents with    Diabetes     type 2    Discuss Labs     Visit Information    Have you changed or started any medications since your last visit including any over-the-counter medicines, vitamins, or herbal medicines? no   Have you stopped taking any of your medications? Is so, why? -  no  Are you having any side effects from any of your medications? - no    Have you seen any other physician or provider since your last visit?  no   Have you had any other diagnostic tests since your last visit?  no   Have you been seen in the emergency room and/or had an admission in a hospital since we last saw you?  no   Have you had your routine dental cleaning in the past 6 months?  no     Do you have an active MyChart account? If no, what is the barrier?   No:     Patient Care Team:  Anca Mirza PA-C as PCP - General (Physician Assistant)  Anca Mirza PA-C as PCP - S Attributed Provider    Medical History Review  Past Medical, Family, and Social History reviewed and does contribute to the patient presenting condition    Health Maintenance   Topic Date Due    Shingles Vaccine (1 of 2 - 2 Dose Series) 11/22/1994    Diabetic retinal exam  12/07/2017    Colon cancer screen colonoscopy  04/05/2018 (Originally 11/22/1994)    Diabetic foot exam  12/13/2023 (Originally 7/7/2016)    Diabetic microalbuminuria test  09/18/2018    A1C test (Diabetic or Prediabetic)  12/11/2018    Lipid screen  02/12/2019    Potassium monitoring  02/12/2019    Creatinine monitoring  02/12/2019    Breast cancer screen  09/27/2019    DTaP/Tdap/Td vaccine (2 - Td) 11/11/2026    DEXA (modify frequency per FRAX score)  Completed and stress. Hemoglobin A1C (%)   Date Value   12/11/2017 8.4   09/18/2017 7.7   04/21/2017 8.1             ( goal A1C is < 7)   No results found for: LABMICR  LDL Cholesterol (mg/dL)   Date Value   02/12/2018 65 (L)     LDL Calculated (mg/dL)   Date Value   04/21/2017 84   08/29/2016 63       (goal LDL is <100)   AST (U/L)   Date Value   02/12/2018 15     ALT (U/L)   Date Value   02/12/2018 27     BUN (mg/dL)   Date Value   02/12/2018 19 (H)     BP Readings from Last 3 Encounters:   03/19/18 124/82   12/11/17 126/80   09/18/17 130/80          (goal 120/80)    Past Medical History:   Diagnosis Date    Anxiety     Hyperlipidemia     Osteoarthritis     Type II or unspecified type diabetes mellitus without mention of complication, not stated as uncontrolled       Past Surgical History:   Procedure Laterality Date    CARDIAC SURGERY      KNEE SURGERY      TONSILLECTOMY         Family History   Problem Relation Age of Onset    Cancer Mother      hodgkins    Diabetes Father        Social History   Substance Use Topics    Smoking status: Former Smoker    Smokeless tobacco: Never Used    Alcohol use No      Current Outpatient Prescriptions   Medication Sig Dispense Refill    LORazepam (ATIVAN) 0.5 MG tablet Take 1 tablet by mouth every 8 hours as needed for Anxiety . 30 tablet 0    traMADol (ULTRAM) 50 MG tablet 1 tablets q 12 hrs prn. 30 tablet 0    insulin glargine (TOUJEO SOLOSTAR) 300 UNIT/ML injection pen Inject 24 Units into the skin nightly 3 pen 3    glucose blood VI test strips (ONE TOUCH ULTRA TEST) strip 1 each by Does not apply route 3 times daily As needed.  100 strip 11    metFORMIN (GLUCOPHAGE) 1000 MG tablet TAKE 1 TABLET TWICE DAILY 180 tablet 1    ONE TOUCH LANCETS MISC 1 each by Does not apply route 3 times daily 100 each 11    glucose blood VI test strips (ASCENSIA AUTODISC VI;ONE TOUCH ULTRA TEST VI) strip 1 each by In Vitro route daily Dx:Uncontrolled  each 11    Insulin Pen Needle (B-D ULTRAFINE III SHORT PEN) 31G X 8 MM MISC Inject 1 each into the skin daily May substitute with any brand 100 each 11    pravastatin (PRAVACHOL) 80 MG tablet Take 80 mg by mouth daily       folic acid (FOLVITE) 1 MG tablet Take 1 mg by mouth daily      glucose monitoring kit (FREESTYLE) monitoring kit 1 kit by Does not apply route daily as needed. 1 kit 0    carvedilol (COREG) 12.5 MG tablet Take 1 tablet by mouth 2 times daily (with meals).  digoxin (LANOXIN) 125 MCG tablet Take 1 tablet by mouth daily.  lisinopril (PRINIVIL;ZESTRIL) 5 MG tablet Take 1 tablet by mouth 2 times daily.  BD PEN NEEDLE LJ U/F 32G X 4 MM MISC USE EVERY DAY 90 each 11    Blood Glucose Monitoring Suppl KIT Please dispense machine with lancets and testing strips, her diagnoses code is 250.00 1 kit 0     No current facility-administered medications for this visit. Allergies   Allergen Reactions    Nicotine     Sulfa Antibiotics        Health Maintenance   Topic Date Due    Shingles Vaccine (1 of 2 - 2 Dose Series) 11/22/1994    Diabetic retinal exam  12/07/2017    Colon cancer screen colonoscopy  04/05/2018 (Originally 11/22/1994)    Diabetic foot exam  12/13/2023 (Originally 7/7/2016)    Diabetic microalbuminuria test  09/18/2018    A1C test (Diabetic or Prediabetic)  12/11/2018    Lipid screen  02/12/2019    Potassium monitoring  02/12/2019    Creatinine monitoring  02/12/2019    Breast cancer screen  09/27/2019    DTaP/Tdap/Td vaccine (2 - Td) 11/11/2026    DEXA (modify frequency per FRAX score)  Completed    Flu vaccine  Completed    Pneumococcal low/med risk  Completed       Subjective:      Review of Systems   Constitutional: Negative for chills, diaphoresis, fatigue, fever and weight loss. HENT: Negative for congestion, ear discharge, ear pain, postnasal drip, rhinorrhea, sinus pressure and sore throat. Eyes: Negative for blurred vision, discharge and itching. Respiratory: Negative for cough, chest tightness and shortness of breath. Cardiovascular: Negative for chest pain, palpitations and leg swelling. Gastrointestinal: Negative for abdominal pain, diarrhea, nausea and vomiting. Endocrine: Negative for polydipsia, polyphagia and polyuria. Genitourinary: Negative for dysuria, frequency and impotence. Musculoskeletal: Negative for neck pain and neck stiffness. Skin: Negative for pallor and rash. Neurological: Negative for dizziness, tremors, focal weakness, seizures, speech difficulty, weakness, light-headedness, numbness and headaches. Psychiatric/Behavioral: Negative for confusion. The patient is not nervous/anxious and does not have insomnia. All other systems reviewed and are negative. Objective:     Physical Exam   Constitutional: She is oriented to person, place, and time. She appears well-developed and well-nourished. No distress. /80  Pulse 82  Temp 98 °F (36.7 °C) (Oral)   Ht 5' 2\" (1.575 m)  Wt 181 lb 3.2 oz (82.2 kg)  SpO2 96%  BMI 33.14 kg/m2     HENT:   Head: Normocephalic and atraumatic. Right Ear: External ear normal.   Left Ear: External ear normal.   Nose: Nose normal.   Mouth/Throat: Oropharynx is clear and moist.   Eyes: Conjunctivae and EOM are normal. Pupils are equal, round, and reactive to light. Right eye exhibits no discharge. Left eye exhibits no discharge. No scleral icterus. Neck: Normal range of motion. Neck supple. No tracheal deviation present. No thyromegaly present. Cardiovascular: Normal rate, regular rhythm and normal heart sounds. Exam reveals no gallop and no friction rub. No murmur heard. Pulmonary/Chest: Effort normal and breath sounds normal. No stridor. No respiratory distress. She has no wheezes. She has no rales. She exhibits no tenderness. Abdominal: Soft. Bowel sounds are normal. She exhibits no distension. There is no tenderness. There is no rebound and no guarding.

## 2018-08-20 ENCOUNTER — OFFICE VISIT (OUTPATIENT)
Dept: FAMILY MEDICINE CLINIC | Age: 73
End: 2018-08-20
Payer: MEDICARE

## 2018-08-20 VITALS
WEIGHT: 176 LBS | HEART RATE: 71 BPM | HEIGHT: 62 IN | TEMPERATURE: 98.5 F | SYSTOLIC BLOOD PRESSURE: 126 MMHG | DIASTOLIC BLOOD PRESSURE: 72 MMHG | OXYGEN SATURATION: 97 % | BODY MASS INDEX: 32.39 KG/M2

## 2018-08-20 DIAGNOSIS — F41.9 ANXIETY: ICD-10-CM

## 2018-08-20 DIAGNOSIS — M19.90 OSTEOARTHRITIS, UNSPECIFIED OSTEOARTHRITIS TYPE, UNSPECIFIED SITE: ICD-10-CM

## 2018-08-20 DIAGNOSIS — J44.9 CHRONIC OBSTRUCTIVE PULMONARY DISEASE, UNSPECIFIED COPD TYPE (HCC): ICD-10-CM

## 2018-08-20 DIAGNOSIS — Z12.39 SCREENING FOR BREAST CANCER: ICD-10-CM

## 2018-08-20 DIAGNOSIS — E78.5 HYPERLIPIDEMIA, UNSPECIFIED HYPERLIPIDEMIA TYPE: ICD-10-CM

## 2018-08-20 LAB — HBA1C MFR BLD: 7.2 %

## 2018-08-20 PROCEDURE — 4040F PNEUMOC VAC/ADMIN/RCVD: CPT | Performed by: PHYSICIAN ASSISTANT

## 2018-08-20 PROCEDURE — G8417 CALC BMI ABV UP PARAM F/U: HCPCS | Performed by: PHYSICIAN ASSISTANT

## 2018-08-20 PROCEDURE — 3014F SCREEN MAMMO DOC REV: CPT | Performed by: PHYSICIAN ASSISTANT

## 2018-08-20 PROCEDURE — G8926 SPIRO NO PERF OR DOC: HCPCS | Performed by: PHYSICIAN ASSISTANT

## 2018-08-20 PROCEDURE — 83036 HEMOGLOBIN GLYCOSYLATED A1C: CPT | Performed by: PHYSICIAN ASSISTANT

## 2018-08-20 PROCEDURE — 1090F PRES/ABSN URINE INCON ASSESS: CPT | Performed by: PHYSICIAN ASSISTANT

## 2018-08-20 PROCEDURE — G8427 DOCREV CUR MEDS BY ELIG CLIN: HCPCS | Performed by: PHYSICIAN ASSISTANT

## 2018-08-20 PROCEDURE — 1123F ACP DISCUSS/DSCN MKR DOCD: CPT | Performed by: PHYSICIAN ASSISTANT

## 2018-08-20 PROCEDURE — 3017F COLORECTAL CA SCREEN DOC REV: CPT | Performed by: PHYSICIAN ASSISTANT

## 2018-08-20 PROCEDURE — 1101F PT FALLS ASSESS-DOCD LE1/YR: CPT | Performed by: PHYSICIAN ASSISTANT

## 2018-08-20 PROCEDURE — G8399 PT W/DXA RESULTS DOCUMENT: HCPCS | Performed by: PHYSICIAN ASSISTANT

## 2018-08-20 PROCEDURE — 2022F DILAT RTA XM EVC RTNOPTHY: CPT | Performed by: PHYSICIAN ASSISTANT

## 2018-08-20 PROCEDURE — 3023F SPIROM DOC REV: CPT | Performed by: PHYSICIAN ASSISTANT

## 2018-08-20 PROCEDURE — 99214 OFFICE O/P EST MOD 30 MIN: CPT | Performed by: PHYSICIAN ASSISTANT

## 2018-08-20 PROCEDURE — 3045F PR MOST RECENT HEMOGLOBIN A1C LEVEL 7.0-9.0%: CPT | Performed by: PHYSICIAN ASSISTANT

## 2018-08-20 PROCEDURE — 1036F TOBACCO NON-USER: CPT | Performed by: PHYSICIAN ASSISTANT

## 2018-08-20 RX ORDER — LORAZEPAM 0.5 MG/1
0.5 TABLET ORAL EVERY 6 HOURS PRN
Qty: 30 TABLET | Refills: 0 | Status: CANCELLED | OUTPATIENT
Start: 2018-08-20 | End: 2018-09-20

## 2018-08-20 RX ORDER — LORAZEPAM 0.5 MG/1
0.5 TABLET ORAL EVERY 6 HOURS PRN
COMMUNITY
End: 2018-08-20 | Stop reason: SDUPTHER

## 2018-08-20 RX ORDER — LUTEIN 10 MG
10 TABLET ORAL
COMMUNITY

## 2018-08-20 RX ORDER — LORAZEPAM 0.5 MG/1
0.5 TABLET ORAL EVERY 6 HOURS PRN
Qty: 30 TABLET | Refills: 0 | Status: CANCELLED | OUTPATIENT
Start: 2018-10-20 | End: 2018-11-20

## 2018-08-20 RX ORDER — TRAMADOL HYDROCHLORIDE 50 MG/1
TABLET ORAL
Qty: 30 TABLET | Refills: 0 | Status: SHIPPED | OUTPATIENT
Start: 2018-08-20 | End: 2018-10-05 | Stop reason: SDUPTHER

## 2018-08-20 RX ORDER — LORAZEPAM 0.5 MG/1
0.5 TABLET ORAL EVERY 6 HOURS PRN
Qty: 30 TABLET | Refills: 0 | Status: SHIPPED | OUTPATIENT
Start: 2018-09-20 | End: 2018-10-20

## 2018-08-20 RX ORDER — LORAZEPAM 0.5 MG/1
0.5 TABLET ORAL EVERY 6 HOURS PRN
COMMUNITY
End: 2018-08-20

## 2018-08-20 RX ORDER — LORAZEPAM 0.5 MG/1
0.5 TABLET ORAL
COMMUNITY
Start: 2018-02-06 | End: 2018-08-20

## 2018-08-20 ASSESSMENT — PATIENT HEALTH QUESTIONNAIRE - PHQ9
SUM OF ALL RESPONSES TO PHQ9 QUESTIONS 1 & 2: 0
SUM OF ALL RESPONSES TO PHQ QUESTIONS 1-9: 0
2. FEELING DOWN, DEPRESSED OR HOPELESS: 0
1. LITTLE INTEREST OR PLEASURE IN DOING THINGS: 0
SUM OF ALL RESPONSES TO PHQ QUESTIONS 1-9: 0

## 2018-08-20 ASSESSMENT — ENCOUNTER SYMPTOMS
EYE DISCHARGE: 0
DIARRHEA: 0
VISUAL CHANGE: 0
EYE ITCHING: 0
COUGH: 0
VOMITING: 0
RHINORRHEA: 0
NAUSEA: 0
ABDOMINAL PAIN: 0
BLURRED VISION: 0
CHEST TIGHTNESS: 0
SORE THROAT: 0
SINUS PRESSURE: 0

## 2018-08-20 NOTE — PROGRESS NOTES
[address]  [address] [address]  Dept: [phone]  Dept Fax: [phone]    Judy Shipman is a 67 y.o. female who presents today for her medical conditions/complaints as noted below. Judy Shipman is c/o of   Chief Complaint   Patient presents with    Diabetes    Medication Refill     Visit Information    Have you changed or started any medications since your last visit including any over-the-counter medicines, vitamins, or herbal medicines? no   Have you stopped taking any of your medications? Is so, why? -  no  Are you having any side effects from any of your medications? - no    Have you seen any other physician or provider since your last visit?  no   Have you had any other diagnostic tests since your last visit?  no   Have you been seen in the emergency room and/or had an admission in a hospital since we last saw you?  no   Have you had your routine dental cleaning in the past 6 months?  no     Do you have an active MyChart account? If no, what is the barrier?   No:     Patient Care Team:  Justin Arvizu PA-C as PCP - General (Physician Assistant)  Justin Arvizu PA-C as PCP - [facility] Attributed Provider    Medical History Review  Past Medical, Family, and Social History reviewed and does contribute to the patient presenting condition    Health Maintenance   Topic Date Due    Diabetic retinal exam  12/07/2017    Colon cancer screen colonoscopy  04/12/2019 (Originally 11/22/1994)    Shingles Vaccine (1 of 2 - 2 Dose Series) 04/04/2023 (Originally 11/22/1994)    Diabetic foot exam  12/13/2023 (Originally 7/7/2016)    Flu vaccine (1) 09/01/2018    Diabetic microalbuminuria test  09/18/2018    A1C test (Diabetic or Prediabetic)  12/11/2018    Lipid screen  02/12/2019    Potassium monitoring  02/12/2019    Creatinine monitoring  02/12/2019    Breast cancer screen  09/27/2019    DTaP/Tdap/Td vaccine (2 - Td) 11/11/2026    obesity, dyslipidemia and stress. Hemoglobin A1C (%)   Date Value   12/11/2017 8.4   09/18/2017 7.7   04/21/2017 8.1             ( goal A1C is < 7)   No results found for: LABMICR  LDL Cholesterol (mg/dL)   Date Value   02/12/2018 65 (L)     LDL Calculated (mg/dL)   Date Value   04/21/2017 84   08/29/2016 63       (goal LDL is <100)   AST (U/L)   Date Value   02/12/2018 15     ALT (U/L)   Date Value   02/12/2018 27     BUN (mg/dL)   Date Value   02/12/2018 19 (H)     BP Readings from Last 3 Encounters:   08/20/18 126/72   03/19/18 124/82   12/11/17 126/80          (goal 120/80)    Past Medical History:   Diagnosis Date    Anxiety     Hyperlipidemia     Osteoarthritis     Type II or unspecified type diabetes mellitus without mention of complication, not stated as uncontrolled       Past Surgical History:   Procedure Laterality Date    CARDIAC SURGERY      KNEE SURGERY      TONSILLECTOMY         Family History   Problem Relation Age of Onset    Cancer Mother         hodgkins    Diabetes Father        Social History   Substance Use Topics    Smoking status: Former Smoker    Smokeless tobacco: Never Used    Alcohol use No      Current Outpatient Prescriptions   Medication Sig Dispense Refill    aspirin 81 MG tablet Take 162 mg by mouth      Niacin (VITAMIN B-3 PO) Take 500 mg by mouth      Lutein 10 MG TABS Take 10 mg by mouth      traMADol (ULTRAM) 50 MG tablet 1 tablets q 12 hrs prn. 30 tablet 0    [START ON 9/20/2018] LORazepam (ATIVAN) 0.5 MG tablet Take 1 tablet by mouth every 6 hours as needed for Anxiety for up to 30 days. . 30 tablet 0    blood glucose test strips (ONE TOUCH ULTRA TEST) strip 1 each by Does not apply route 3 times daily As needed.  100 strip 11    ONE TOUCH LANCETS MISC 1 each by Does not apply route 3 times daily 100 each 11    metFORMIN (GLUCOPHAGE) 1000 MG tablet Take 1 tablet by mouth 2 times daily 180 tablet 1    insulin glargine (TOUJEO SOLOSTAR) 300 UNIT/ML injection pen Inject 24 Units into the skin nightly 3 pen 3    BD PEN NEEDLE LJ U/F 32G X 4 MM MISC USE EVERY DAY 90 each 11    glucose blood VI test strips (ASCENSIA AUTODISC VI;ONE TOUCH ULTRA TEST VI) strip 1 each by In Vitro route daily Dx:Uncontrolled  each 11    Insulin Pen Needle (B-D ULTRAFINE III SHORT PEN) 31G X 8 MM MISC Inject 1 each into the skin daily May substitute with any brand 100 each 11    pravastatin (PRAVACHOL) 80 MG tablet Take 80 mg by mouth daily       Blood Glucose Monitoring Suppl KIT Please dispense machine with lancets and testing strips, her diagnoses code is 803.28 1 kit 0    folic acid (FOLVITE) 1 MG tablet Take 1 mg by mouth daily      glucose monitoring kit (FREESTYLE) monitoring kit 1 kit by Does not apply route daily as needed. 1 kit 0    carvedilol (COREG) 12.5 MG tablet Take 1 tablet by mouth 2 times daily (with meals).  digoxin (LANOXIN) 125 MCG tablet Take 1 tablet by mouth daily.  lisinopril (PRINIVIL;ZESTRIL) 5 MG tablet Take 1 tablet by mouth 2 times daily. No current facility-administered medications for this visit. Allergies   Allergen Reactions    Mineral Oil Diarrhea    Niacin Itching     Slow Niacin    Nicotine     Sulfa Antibiotics      Other reaction(s):  Intolerance-unknown    Vitamin E Diarrhea       Health Maintenance   Topic Date Due    Diabetic retinal exam  12/07/2017    Colon cancer screen colonoscopy  04/12/2019 (Originally 11/22/1994)    Shingles Vaccine (1 of 2 - 2 Dose Series) 04/04/2023 (Originally 11/22/1994)    Diabetic foot exam  12/13/2023 (Originally 7/7/2016)    Flu vaccine (1) 09/01/2018    Diabetic microalbuminuria test  09/18/2018    A1C test (Diabetic or Prediabetic)  12/11/2018    Lipid screen  02/12/2019    Potassium monitoring  02/12/2019    Creatinine monitoring  02/12/2019    Breast cancer screen  09/27/2019    DTaP/Tdap/Td vaccine (2 - Td) 11/11/2026    DEXA (modify frequency per [facility] No murmur heard. Pulmonary/Chest: Effort normal and breath sounds normal. No stridor. No respiratory distress. She has no wheezes. She has no rales. She exhibits no tenderness. Abdominal: Soft. Bowel sounds are normal. She exhibits no distension. There is no tenderness. There is no rebound and no guarding. Musculoskeletal: She exhibits no edema. Neurological: She is alert and oriented to person, place, and time. Gait normal.   Skin: Skin is warm and dry. No rash noted. She is not diaphoretic. Nail fungus    Psychiatric: She has a normal mood and affect. Her affect is not inappropriate. Nursing note and vitals reviewed. Diabetic Foot Exam  Observation: normal  Sensation:   Monofilament Sensation:  normal    Light Touch: normal  Color:  normal  Pulses:  normal,   Edema: normal  Skin: normal    Nail fungus - Pt refusing tx    /72   Pulse 71   Temp 98.5 °F (36.9 °C) (Oral)   Ht 5' 2\" (1.575 m)   Wt 176 lb (79.8 kg)   SpO2 97%   BMI 32.19 kg/m²     Assessment:       Diagnosis Orders   1. Uncontrolled type 2 diabetes mellitus with complication, with long-term current use of insulin ([facility])  POCT glycosylated hemoglobin (Hb A1C)   2. Chronic obstructive pulmonary disease, unspecified COPD type ([facility] [address]     3. Hyperlipidemia, unspecified hyperlipidemia type     4. Osteoarthritis, unspecified osteoarthritis type, unspecified site  traMADol (ULTRAM) 50 MG tablet   5. Anxiety  LORazepam (ATIVAN) 0.5 MG tablet   6. Screening for breast cancer               Plan:      No Follow-up on file. Orders Placed This Encounter   Procedures    POCT glycosylated hemoglobin (Hb A1C)     Orders Placed This Encounter   Medications    traMADol (ULTRAM) 50 MG tablet     Si tablets q 12 hrs prn. Dispense:  30 tablet     Refill:  0    LORazepam (ATIVAN) 0.5 MG tablet     Sig: Take 1 tablet by mouth every 6 hours as needed for Anxiety for up to 30 days. .     Dispense:  30 tablet     Refill:  0    blood glucose test strips (ONE TOUCH ULTRA TEST) strip     Si each by Does not apply route 3 times daily As needed. Dispense:  100 strip     Refill:  11     Any brand that is covered is fine     icd 10 code  E11.8    ONE TOUCH LANCETS MISC     Si each by Does not apply route 3 times daily     Dispense:  100 each     Refill:  11     Dx: Uncontrolled DM. ICD E 11.65  CHECK BS DAILY     COPD - Recent bronchitis. Cxr showed COPD. No GHOTRA. Cough resolved.       HTN - Taking meds and tolerating well. Cont tx.      DM - Taking metformin. Was taking [place] and [place] however stopped d/t cost. States cannot afford it. A1C now 9.1. Add invokana - pt did not tolerate. Diet and exercise encouraged. A1C 8.7. Use to take insulin. Prefers to go on insulin vs expensive meds. Doing well. AM . On toujeo 22. A1C 8.1. A1C 7.7 17. Increase insulin to 24 units. Pt refusing insulin increase or switch in type of insulin d/t cost.  Understands risk of uncontrolled DM.      HLD - Cont meds. Diet and exercise encouraged. Will repeat and recheck.      Anxiety - On ativan prn.       OA - States prescribed ultram > 1 yr ago. Takes about 1 / mo. D/w pt concerns regarding this med and this in combination with ativan. Will rx few for very limited use.      Normal monofilament exam 16.      Pt refusing colonoscopy. Understands benefit      EDWARD 2015 nl. New order given.      Flu through pharmacy 10/2016.      Pneumovax 23 1/20/15    Prevnar 13 - 17      Zostavax .     Tdap - 16      Pelvic - Pt states done with GYN exams    Microalb - 2017    Foot exam - Refusing      Patient given educational materials - see patient instructions. Discussed use, benefit, and side effects of prescribed medications. All patient questions answered. Pt voiced understanding. Reviewed health maintenance. Instructed to continue current medications, diet and exercise. Patient agreed with treatment plan. Follow up as directed.

## 2018-10-04 ENCOUNTER — TELEPHONE (OUTPATIENT)
Dept: FAMILY MEDICINE CLINIC | Age: 73
End: 2018-10-04

## 2018-10-04 DIAGNOSIS — Z12.39 SCREENING FOR BREAST CANCER: Primary | ICD-10-CM

## 2018-10-04 DIAGNOSIS — M19.90 OSTEOARTHRITIS, UNSPECIFIED OSTEOARTHRITIS TYPE, UNSPECIFIED SITE: ICD-10-CM

## 2018-10-05 RX ORDER — TRAMADOL HYDROCHLORIDE 50 MG/1
50 TABLET ORAL 2 TIMES DAILY
Qty: 30 TABLET | Refills: 0 | Status: SHIPPED | OUTPATIENT
Start: 2018-10-05 | End: 2018-11-05

## 2018-11-05 NOTE — TELEPHONE ENCOUNTER
Please Approve or Refuse.   Send to Pharmacy per Pt's Request: [facility]  Mail delivery [phone]     Next Visit Date:  2/18/2019   Last Visit Date: 8/20/2018    Hemoglobin A1C (%)   Date Value   08/20/2018 7.2   12/11/2017 8.4   09/18/2017 7.7             ( goal A1C is < 7)   BP Readings from Last 3 Encounters:   08/20/18 126/72   03/19/18 124/82   12/11/17 126/80          (goal 120/80)  BUN   Date Value Ref Range Status   02/12/2018 19 (H) 7 - 18 mg/dL Final     CREATININE   Date Value Ref Range Status   02/12/2018 0.85 0.55 - 1.02 mg/dL Final     Potassium   Date Value Ref Range Status   02/12/2018 4.3 3.5 - 5.1 mmol/L Final

## 2018-11-29 LAB
ALBUMIN: 3.7 G/DL (ref 3.5–5)
ALP BLD-CCNC: 53 U/L (ref 45–117)
ALT SERPL-CCNC: 26 U/L (ref 12–78)
AST SERPL-CCNC: 17 U/L (ref 15–37)
BASOPHILS ABSOLUTE: 0.1 10'3/UL (ref 0.1–0.2)
BASOPHILS RELATIVE PERCENT: 1.5 % (ref 0–1.7)
BILIRUB SERPL-MCNC: 0.3 MG/DL (ref 0–1)
BUN BLDV-MCNC: 15 MG/DL (ref 7–18)
CALCIUM SERPL-MCNC: 8.8 MG/DL (ref 8.5–10.1)
CHLORIDE BLD-SCNC: 105 MMOL/L (ref 97–107)
CHOLESTEROL/HDL RELATIVE RISK: 4.19
CHOLESTEROL: 155 MG/DL (ref 100–200)
CO2: 27 MMOL/L (ref 21–32)
CREAT SERPL-MCNC: 0.9 MG/DL (ref 0.55–1.02)
EOSINOPHILS ABSOLUTE: 0.1 10'3/UL (ref 0–0.4)
EOSINOPHILS RELATIVE PERCENT: 1.5 % (ref 0–6.4)
ESTIMATED AVERAGE GLUCOSE: 192 MG/DL
FOLATE: 27.3 NG/ML (ref 3.1–17.5)
GFR CALCULATED: > 60
GLUCOSE: 194 MG/DL (ref 70–99)
HBA1C MFR BLD: 8.3 % (ref 4–5.6)
HCT VFR BLD CALC: 40.7 % (ref 34.6–44.1)
HDLC SERPL-MCNC: 37 MG/DL (ref 45–60)
HEMOGLOBIN: 13.6 G/DL (ref 11.7–14.9)
LDL CHOLESTEROL: 81 MG/DL (ref 88–198)
LYMPHOCYTES ABSOLUTE: 2.4 10'3/UL (ref 0.5–3.5)
LYMPHOCYTES RELATIVE PERCENT: 33.5 % (ref 17.4–45.9)
MCH RBC QN AUTO: 31.3 PG (ref 27.8–33.2)
MCHC RBC AUTO-ENTMCNC: 33.3 G/DL (ref 32.7–34.8)
MCV RBC AUTO: 93.9 FL (ref 83–97.4)
MONOCYTES ABSOLUTE: 0.5 10'3/UL (ref 0.2–0.8)
MONOCYTES RELATIVE PERCENT: 6.5 % (ref 4.4–12)
NEUTROPHILS ABSOLUTE: 4.1 10'3/UL (ref 1.5–5.6)
NEUTROPHILS SEGMENTED: 57 % (ref 41.2–72.1)
PDW BLD-RTO: 13.4 % (ref 12.2–15.8)
PLATELET # BLD: 211 10'3/UL (ref 122–359)
PMV BLD AUTO: 7.9 FL (ref 7.6–10.6)
POTASSIUM SERPL-SCNC: 4.1 MMOL/L (ref 3.5–5.1)
RBC # BLD: 4.33 10'6/UL (ref 3.85–4.88)
SODIUM BLD-SCNC: 143 MMOL/L (ref 136–145)
TOTAL PROTEIN: 6.7 G/DL (ref 6.4–8.2)
TRIGL SERPL-MCNC: 187 MG/DL
TSH SERPL DL<=0.05 MIU/L-ACNC: 1.66 UIU/ML (ref 0.36–3.74)
VITAMIN D 25-HYDROXY: 39 NG/ML (ref 30–100)
VITAMIN D2, 25 HYDROXY: <4 NG/ML
VITAMIN D3,25 HYDROXY: 39 NG/ML
VLDLC SERPL CALC-MCNC: 37 MG/DL (ref 5–35)
WBC: 7.1 10'3/UL (ref 3.2–9.3)

## 2018-12-03 DIAGNOSIS — E55.9 VITAMIN D DEFICIENCY: ICD-10-CM

## 2018-12-04 ENCOUNTER — TELEPHONE (OUTPATIENT)
Dept: FAMILY MEDICINE CLINIC | Age: 73
End: 2018-12-04

## 2019-01-02 RX ORDER — PEN NEEDLE, DIABETIC 32GX 5/32"
NEEDLE, DISPOSABLE MISCELLANEOUS
Qty: 90 EACH | Refills: 11 | Status: SHIPPED | OUTPATIENT
Start: 2019-01-02 | End: 2020-03-17 | Stop reason: SDUPTHER

## 2019-02-15 ENCOUNTER — TELEPHONE (OUTPATIENT)
Dept: FAMILY MEDICINE CLINIC | Age: 74
End: 2019-02-15

## 2019-02-18 ENCOUNTER — OFFICE VISIT (OUTPATIENT)
Dept: FAMILY MEDICINE CLINIC | Age: 74
End: 2019-02-18
Payer: MEDICARE

## 2019-02-18 ENCOUNTER — TELEPHONE (OUTPATIENT)
Dept: FAMILY MEDICINE CLINIC | Age: 74
End: 2019-02-18

## 2019-02-18 VITALS
BODY MASS INDEX: 31.28 KG/M2 | DIASTOLIC BLOOD PRESSURE: 70 MMHG | TEMPERATURE: 97.8 F | OXYGEN SATURATION: 98 % | SYSTOLIC BLOOD PRESSURE: 124 MMHG | HEART RATE: 70 BPM | HEIGHT: 62 IN | WEIGHT: 170 LBS

## 2019-02-18 DIAGNOSIS — M15.9 PRIMARY OSTEOARTHRITIS INVOLVING MULTIPLE JOINTS: ICD-10-CM

## 2019-02-18 DIAGNOSIS — F41.9 ANXIETY: ICD-10-CM

## 2019-02-18 DIAGNOSIS — E11.9 DIABETES MELLITUS TYPE 2 IN NONOBESE (HCC): Primary | ICD-10-CM

## 2019-02-18 LAB — HBA1C MFR BLD: 8.6 %

## 2019-02-18 PROCEDURE — G8417 CALC BMI ABV UP PARAM F/U: HCPCS | Performed by: PHYSICIAN ASSISTANT

## 2019-02-18 PROCEDURE — 83036 HEMOGLOBIN GLYCOSYLATED A1C: CPT | Performed by: PHYSICIAN ASSISTANT

## 2019-02-18 PROCEDURE — 1090F PRES/ABSN URINE INCON ASSESS: CPT | Performed by: PHYSICIAN ASSISTANT

## 2019-02-18 PROCEDURE — G8399 PT W/DXA RESULTS DOCUMENT: HCPCS | Performed by: PHYSICIAN ASSISTANT

## 2019-02-18 PROCEDURE — G8482 FLU IMMUNIZE ORDER/ADMIN: HCPCS | Performed by: PHYSICIAN ASSISTANT

## 2019-02-18 PROCEDURE — 1101F PT FALLS ASSESS-DOCD LE1/YR: CPT | Performed by: PHYSICIAN ASSISTANT

## 2019-02-18 PROCEDURE — G8427 DOCREV CUR MEDS BY ELIG CLIN: HCPCS | Performed by: PHYSICIAN ASSISTANT

## 2019-02-18 PROCEDURE — 99214 OFFICE O/P EST MOD 30 MIN: CPT | Performed by: PHYSICIAN ASSISTANT

## 2019-02-18 PROCEDURE — 3017F COLORECTAL CA SCREEN DOC REV: CPT | Performed by: PHYSICIAN ASSISTANT

## 2019-02-18 PROCEDURE — 4040F PNEUMOC VAC/ADMIN/RCVD: CPT | Performed by: PHYSICIAN ASSISTANT

## 2019-02-18 PROCEDURE — 1123F ACP DISCUSS/DSCN MKR DOCD: CPT | Performed by: PHYSICIAN ASSISTANT

## 2019-02-18 PROCEDURE — 1036F TOBACCO NON-USER: CPT | Performed by: PHYSICIAN ASSISTANT

## 2019-02-18 PROCEDURE — 3045F PR MOST RECENT HEMOGLOBIN A1C LEVEL 7.0-9.0%: CPT | Performed by: PHYSICIAN ASSISTANT

## 2019-02-18 PROCEDURE — 2022F DILAT RTA XM EVC RTNOPTHY: CPT | Performed by: PHYSICIAN ASSISTANT

## 2019-02-18 RX ORDER — TRAMADOL HYDROCHLORIDE 50 MG/1
50 TABLET ORAL EVERY 8 HOURS PRN
Qty: 30 TABLET | Refills: 0 | Status: SHIPPED | OUTPATIENT
Start: 2019-02-18 | End: 2019-03-20

## 2019-02-18 RX ORDER — LORAZEPAM 0.5 MG/1
0.5 TABLET ORAL 2 TIMES DAILY
Qty: 60 TABLET | Refills: 1 | Status: SHIPPED | OUTPATIENT
Start: 2019-02-18 | End: 2019-03-20

## 2019-02-18 RX ORDER — SERTRALINE HYDROCHLORIDE 25 MG/1
25 TABLET, FILM COATED ORAL DAILY
Qty: 30 TABLET | Refills: 3 | Status: SHIPPED | OUTPATIENT
Start: 2019-02-18 | End: 2019-05-24

## 2019-02-18 ASSESSMENT — ENCOUNTER SYMPTOMS
COUGH: 0
PHOTOPHOBIA: 0
ABDOMINAL PAIN: 0
CONSTIPATION: 0
SINUS PRESSURE: 0
ABDOMINAL DISTENTION: 0
SORE THROAT: 0
SHORTNESS OF BREATH: 0
EYE REDNESS: 0
WHEEZING: 0
BACK PAIN: 0
DIARRHEA: 0
CHEST TIGHTNESS: 0
COLOR CHANGE: 0
BLOOD IN STOOL: 0
VOMITING: 0
NAUSEA: 0

## 2019-02-18 ASSESSMENT — PATIENT HEALTH QUESTIONNAIRE - PHQ9
SUM OF ALL RESPONSES TO PHQ9 QUESTIONS 1 & 2: 0
2. FEELING DOWN, DEPRESSED OR HOPELESS: 0
SUM OF ALL RESPONSES TO PHQ QUESTIONS 1-9: 0
SUM OF ALL RESPONSES TO PHQ QUESTIONS 1-9: 0
1. LITTLE INTEREST OR PLEASURE IN DOING THINGS: 0

## 2019-02-26 ENCOUNTER — TELEPHONE (OUTPATIENT)
Dept: FAMILY MEDICINE CLINIC | Age: 74
End: 2019-02-26

## 2019-02-28 ENCOUNTER — TELEPHONE (OUTPATIENT)
Dept: FAMILY MEDICINE CLINIC | Age: 74
End: 2019-02-28

## 2019-05-24 ENCOUNTER — HOSPITAL ENCOUNTER (OUTPATIENT)
Age: 74
Setting detail: SPECIMEN
Discharge: HOME | End: 2019-05-24
Payer: MEDICARE

## 2019-05-24 ENCOUNTER — OFFICE VISIT (OUTPATIENT)
Dept: FAMILY MEDICINE CLINIC | Age: 74
End: 2019-05-24
Payer: MEDICARE

## 2019-05-24 VITALS
HEART RATE: 81 BPM | DIASTOLIC BLOOD PRESSURE: 84 MMHG | BODY MASS INDEX: 31.83 KG/M2 | TEMPERATURE: 96.9 F | WEIGHT: 173 LBS | SYSTOLIC BLOOD PRESSURE: 137 MMHG | OXYGEN SATURATION: 95 % | HEIGHT: 62 IN

## 2019-05-24 DIAGNOSIS — Z79.4 TYPE 2 DIABETES MELLITUS WITH COMPLICATION, WITH LONG-TERM CURRENT USE OF INSULIN (HCC): Primary | ICD-10-CM

## 2019-05-24 DIAGNOSIS — E11.8 TYPE 2 DIABETES MELLITUS WITH COMPLICATION, WITH LONG-TERM CURRENT USE OF INSULIN (HCC): Primary | ICD-10-CM

## 2019-05-24 DIAGNOSIS — J44.9 CHRONIC OBSTRUCTIVE PULMONARY DISEASE, UNSPECIFIED COPD TYPE (HCC): ICD-10-CM

## 2019-05-24 DIAGNOSIS — R05.9 COUGH: ICD-10-CM

## 2019-05-24 DIAGNOSIS — M15.9 PRIMARY OSTEOARTHRITIS INVOLVING MULTIPLE JOINTS: ICD-10-CM

## 2019-05-24 DIAGNOSIS — F41.9 ANXIETY: ICD-10-CM

## 2019-05-24 DIAGNOSIS — E78.5 HYPERLIPIDEMIA, UNSPECIFIED HYPERLIPIDEMIA TYPE: ICD-10-CM

## 2019-05-24 DIAGNOSIS — M85.80 OTHER SPECIFIED DISORDERS OF BONE DENSITY AND STRUCTURE, UNSPECIFIED SITE: ICD-10-CM

## 2019-05-24 DIAGNOSIS — E11.65 UNCONTROLLED TYPE 2 DIABETES MELLITUS WITH HYPERGLYCEMIA (HCC): ICD-10-CM

## 2019-05-24 DIAGNOSIS — Z51.81 ENCOUNTER FOR THERAPEUTIC DRUG LEVEL MONITORING: ICD-10-CM

## 2019-05-24 LAB
AMPHETAMINE SCREEN URINE: NEGATIVE
BARBITURATE SCREEN URINE: NEGATIVE
BENZODIAZEPINE SCREEN, URINE: NEGATIVE
BUPRENORPHINE URINE: NORMAL
CANNABINOID SCREEN URINE: NEGATIVE
COCAINE METABOLITE, URINE: NEGATIVE
HBA1C MFR BLD: 5.5 %
MDMA URINE: NORMAL
METHADONE SCREEN, URINE: NEGATIVE
METHAMPHETAMINE, URINE: NORMAL
OPIATES, URINE: NEGATIVE
OXYCODONE SCREEN URINE: NEGATIVE
PHENCYCLIDINE, URINE: NEGATIVE
PROPOXYPHENE, URINE: NORMAL
TEST INFORMATION: NORMAL
TRICYCLIC ANTIDEPRESSANTS, UR: NORMAL

## 2019-05-24 PROCEDURE — 4040F PNEUMOC VAC/ADMIN/RCVD: CPT | Performed by: PHYSICIAN ASSISTANT

## 2019-05-24 PROCEDURE — 2022F DILAT RTA XM EVC RTNOPTHY: CPT | Performed by: PHYSICIAN ASSISTANT

## 2019-05-24 PROCEDURE — 83036 HEMOGLOBIN GLYCOSYLATED A1C: CPT | Performed by: PHYSICIAN ASSISTANT

## 2019-05-24 PROCEDURE — 99214 OFFICE O/P EST MOD 30 MIN: CPT | Performed by: PHYSICIAN ASSISTANT

## 2019-05-24 PROCEDURE — 1090F PRES/ABSN URINE INCON ASSESS: CPT | Performed by: PHYSICIAN ASSISTANT

## 2019-05-24 PROCEDURE — G8427 DOCREV CUR MEDS BY ELIG CLIN: HCPCS | Performed by: PHYSICIAN ASSISTANT

## 2019-05-24 PROCEDURE — 3017F COLORECTAL CA SCREEN DOC REV: CPT | Performed by: PHYSICIAN ASSISTANT

## 2019-05-24 PROCEDURE — 3023F SPIROM DOC REV: CPT | Performed by: PHYSICIAN ASSISTANT

## 2019-05-24 PROCEDURE — G8926 SPIRO NO PERF OR DOC: HCPCS | Performed by: PHYSICIAN ASSISTANT

## 2019-05-24 PROCEDURE — 1123F ACP DISCUSS/DSCN MKR DOCD: CPT | Performed by: PHYSICIAN ASSISTANT

## 2019-05-24 PROCEDURE — G8417 CALC BMI ABV UP PARAM F/U: HCPCS | Performed by: PHYSICIAN ASSISTANT

## 2019-05-24 PROCEDURE — 3045F PR MOST RECENT HEMOGLOBIN A1C LEVEL 7.0-9.0%: CPT | Performed by: PHYSICIAN ASSISTANT

## 2019-05-24 PROCEDURE — G8399 PT W/DXA RESULTS DOCUMENT: HCPCS | Performed by: PHYSICIAN ASSISTANT

## 2019-05-24 PROCEDURE — 1036F TOBACCO NON-USER: CPT | Performed by: PHYSICIAN ASSISTANT

## 2019-05-24 RX ORDER — TRAMADOL HYDROCHLORIDE 50 MG/1
50 TABLET ORAL EVERY 6 HOURS PRN
Qty: 30 TABLET | Refills: 0 | Status: SHIPPED | OUTPATIENT
Start: 2019-05-24 | End: 2019-06-23

## 2019-05-24 RX ORDER — LORAZEPAM 0.5 MG/1
0.5 TABLET ORAL EVERY 8 HOURS PRN
Qty: 30 TABLET | Refills: 1 | Status: SHIPPED | OUTPATIENT
Start: 2019-05-24 | End: 2019-06-23

## 2019-05-24 ASSESSMENT — ENCOUNTER SYMPTOMS
NAUSEA: 0
BLURRED VISION: 0
DIARRHEA: 0
SHORTNESS OF BREATH: 0
RHINORRHEA: 0
EYE ITCHING: 0
SORE THROAT: 0
CHEST TIGHTNESS: 0
SINUS PRESSURE: 0
COUGH: 0
EYE DISCHARGE: 0
VISUAL CHANGE: 0
VOMITING: 0
ABDOMINAL PAIN: 0

## 2019-05-24 NOTE — PROGRESS NOTES
[address]  [address] [address]  Dept: [phone]  Dept Fax: [phone]    Mildred Nunez is a 75 y.o. female who presents today for her medical conditions/complaints as noted below. Mildred Nunez is c/o of   Chief Complaint   Patient presents with    Diabetes     type 2    Medication Check     zoloft anger issues- needs antivan      Visit Information    Have you changed or started any medications since your last visit including any over-the-counter medicines, vitamins, or herbal medicines? no   Have you stopped taking any of your medications? Is so, why? -  no  Are you having any side effects from any of your medications? - no    Have you seen any other physician or provider since your last visit?  no   Have you had any other diagnostic tests since your last visit?  no   Have you been seen in the emergency room and/or had an admission in a hospital since we last saw you?  no   Have you had your routine dental cleaning in the past 6 months?  no     Do you have an active [employer] account? If no, what is the barrier?   No:     Patient Care Team:  Otto Scanlon PA-C as PCP - General (Physician Assistant)  Valerie Soto PA-C as PCP - [facility] Attributed Provider    Medical History Review  Past Medical, Family, and Social History reviewed and does contribute to the patient presenting condition    Health Maintenance   Topic Date Due    Colon cancer screen colonoscopy  11/22/1994    Diabetic microalbuminuria test  09/18/2018    Diabetic retinal exam  09/15/2020 (Originally 12/7/2017)    Shingles Vaccine (1 of 2) 04/04/2023 (Originally 11/22/1994)    Diabetic foot exam  12/13/2023 (Originally 7/7/2016)    Lipid screen  11/29/2019    Potassium monitoring  11/29/2019    Creatinine monitoring  11/29/2019    A1C test (Diabetic or Prediabetic)  02/18/2020    Breast cancer screen  10/01/2020    DTaP/Tdap/Td vaccine (2 - Td) 11/11/2026    pain, myalgias or shortness of breath. The current treatment provides moderate improvement of lipids. Risk factors for coronary artery disease include obesity, dyslipidemia and stress. Hemoglobin A1C (%)   Date Value   02/18/2019 8.6   11/29/2018 8.3 (H)   08/20/2018 7.2             ( goal A1C is < 7)   No results found for: LABMICR  LDL Cholesterol (mg/dL)   Date Value   11/29/2018 81 (L)   02/12/2018 65 (L)     LDL Calculated (mg/dL)   Date Value   04/21/2017 84   08/29/2016 63       (goal LDL is <100)   AST (U/L)   Date Value   11/29/2018 17     ALT (U/L)   Date Value   11/29/2018 26     BUN (mg/dL)   Date Value   11/29/2018 15     BP Readings from Last 3 Encounters:   05/24/19 137/84   02/18/19 124/70   08/20/18 126/72          (goal 120/80)    Past Medical History:   Diagnosis Date    Anxiety     Hyperlipidemia     Osteoarthritis     Type II or unspecified type diabetes mellitus without mention of complication, not stated as uncontrolled       Past Surgical History:   Procedure Laterality Date    CARDIAC SURGERY      KNEE SURGERY      TONSILLECTOMY         Family History   Problem Relation Age of Onset    Cancer Mother         hodgkins    Diabetes Father        Social History     Tobacco Use    Smoking status: Former Smoker    Smokeless tobacco: Never Used   Substance Use Topics    Alcohol use: No      Current Outpatient Medications   Medication Sig Dispense Refill    LORazepam (ATIVAN) 0.5 MG tablet Take 1 tablet by mouth every 8 hours as needed for Anxiety for up to 30 days. 30 tablet 1    traMADol (ULTRAM) 50 MG tablet Take 1 tablet by mouth every 6 hours as needed for Pain for up to 30 days. Intended supply: 3 days.  Take lowest dose possible to manage pain 30 tablet 0    metFORMIN (GLUCOPHAGE) 1000 MG tablet TAKE 1 TABLET TWICE DAILY 180 tablet 1    BD PEN NEEDLE LJ U/F 32G X 4 MM MISC USE EVERY DAY 90 each 11    aspirin 81 MG tablet Take 162 mg by mouth      Niacin (VITAMIN B-3 PO) Take 500 mg by mouth      Lutein 10 MG TABS Take 10 mg by mouth      blood glucose test strips (ONE TOUCH ULTRA TEST) strip 1 each by Does not apply route 3 times daily As needed. 100 strip 11    ONE TOUCH LANCETS MISC 1 each by Does not apply route 3 times daily 100 each 11    insulin glargine (TOUJEO SOLOSTAR) 300 UNIT/ML injection pen Inject 24 Units into the skin nightly 3 pen 3    glucose blood VI test strips (ASCENSIA AUTODISC VI;ONE TOUCH ULTRA TEST VI) strip 1 each by In Vitro route daily Dx:Uncontrolled  each 11    Insulin Pen Needle (B-D ULTRAFINE III SHORT PEN) 31G X 8 MM MISC Inject 1 each into the skin daily May substitute with any brand 100 each 11    pravastatin (PRAVACHOL) 80 MG tablet Take 80 mg by mouth daily       Blood Glucose Monitoring Suppl KIT Please dispense machine with lancets and testing strips, her diagnoses code is 028.88 1 kit 0    folic acid (FOLVITE) 1 MG tablet Take 1 mg by mouth daily      glucose monitoring kit (FREESTYLE) monitoring kit 1 kit by Does not apply route daily as needed. 1 kit 0    carvedilol (COREG) 12.5 MG tablet Take 1 tablet by mouth 2 times daily (with meals).  digoxin (LANOXIN) 125 MCG tablet Take 1 tablet by mouth daily.  lisinopril (PRINIVIL;ZESTRIL) 5 MG tablet Take 1 tablet by mouth 2 times daily. No current facility-administered medications for this visit. Allergies   Allergen Reactions    Docosahexaenoic Acid (Dha) Diarrhea    Fish Oil Diarrhea    Mineral Oil Diarrhea    Niacin Itching     Slow Niacin    Nicotine     Other Diarrhea    Sulfa Antibiotics      Other reaction(s): Intolerance-unknown  Other reaction(s):  Intolerance-unknown    Vitamin E Diarrhea       Health Maintenance   Topic Date Due    Colon cancer screen colonoscopy  11/22/1994    Diabetic microalbuminuria test  09/18/2018    Diabetic retinal exam  09/15/2020 (Originally 12/7/2017)    Shingles Vaccine (1 of 2) 04/04/2023 (Originally 11/22/1994)    Diabetic foot exam  12/13/2023 (Originally 7/7/2016)    Lipid screen  11/29/2019    Potassium monitoring  11/29/2019    Creatinine monitoring  11/29/2019    A1C test (Diabetic or Prediabetic)  02/18/2020    Breast cancer screen  10/01/2020    DTaP/Tdap/Td vaccine (2 - Td) 11/11/2026    DEXA (modify frequency per FRAX score)  Completed    Flu vaccine  Completed    Pneumococcal 65+ years Vaccine  Completed       Subjective:      Review of Systems   Constitutional: Negative for chills, diaphoresis, fatigue, fever and weight loss. HENT: Negative for congestion, ear discharge, ear pain, postnasal drip, rhinorrhea, sinus pressure and sore throat. Eyes: Negative for blurred vision, discharge and itching. Respiratory: Negative for cough, chest tightness and shortness of breath. Cardiovascular: Negative for chest pain, palpitations and leg swelling. Gastrointestinal: Negative for abdominal pain, diarrhea, nausea and vomiting. Endocrine: Negative for polydipsia, polyphagia and polyuria. Genitourinary: Negative for dysuria, frequency and impotence. Musculoskeletal: Negative for myalgias, neck pain and neck stiffness. Skin: Negative for pallor and rash. Neurological: Negative for dizziness, tremors, focal weakness, seizures, speech difficulty, weakness, light-headedness, numbness and headaches. Psychiatric/Behavioral: Negative for confusion. The patient is not nervous/anxious and does not have insomnia. All other systems reviewed and are negative. Objective:     Physical Exam   Constitutional: She is oriented to person, place, and time. She appears well-developed and well-nourished. No distress. /80  Pulse 82  Temp 98 °F (36.7 °C) (Oral)   Ht 5' 2\" (1.575 m)  Wt 181 lb 3.2 oz (82.2 kg)  SpO2 96%  BMI 33.14 kg/m2     HENT:   Head: Normocephalic and atraumatic.    Right Ear: External ear normal.   Left Ear: External ear normal.   Nose: Nose normal.   Mouth/Throat: Oropharynx is clear and moist.   Eyes: Pupils are equal, round, and reactive to light. Conjunctivae and EOM are normal. Right eye exhibits no discharge. Left eye exhibits no discharge. No scleral icterus. Neck: Normal range of motion. Neck supple. No tracheal deviation present. No thyromegaly present. Cardiovascular: Normal rate, regular rhythm and normal heart sounds. Exam reveals no gallop and no friction rub. No murmur heard. Pulmonary/Chest: Effort normal and breath sounds normal. No stridor. No respiratory distress. She has no wheezes. She has no rales. She exhibits no tenderness. Abdominal: Soft. Bowel sounds are normal. She exhibits no distension. There is no tenderness. There is no rebound and no guarding. Musculoskeletal: She exhibits no edema. Neurological: She is alert and oriented to person, place, and time. Gait normal.   Skin: Skin is warm and dry. No rash noted. She is not diaphoretic. Nail fungus    Psychiatric: She has a normal mood and affect. Her affect is not inappropriate. Nursing note and vitals reviewed. Diabetic Foot Exam  Observation: normal  Sensation:   Monofilament Sensation:  normal    Light Touch: normal  Color:  normal  Pulses:  normal,   Edema: normal  Skin: normal    Nail fungus - Pt refusing tx    /84   Pulse 81   Temp 96.9 °F (36.1 °C) (Oral)   Ht 5' 2\" (1.575 m)   Wt 173 lb (78.5 kg)   SpO2 95%   BMI 31.64 kg/m²     Assessment:       Diagnosis Orders   1. Type 2 diabetes mellitus with complication, with long-term current use of insulin ([facility])  POCT microalbumin    POCT glycosylated hemoglobin (Hb A1C)    CBC Auto Differential    Comprehensive Metabolic Panel    Hemoglobin A1C   2. Hyperlipidemia, unspecified hyperlipidemia type  CBC Auto Differential    Comprehensive Metabolic Panel    Lipid Panel   3.  Primary osteoarthritis involving multiple joints  POCT Rapid Drug Screen    CBC Auto Differential    Comprehensive Metabolic Panel    traMADol (ULTRAM) 50 MG tablet   4. Anxiety  POCT Rapid Drug Screen    CBC Auto Differential    Comprehensive Metabolic Panel    TSH with Reflex    Vitamin D 25 Hydroxy    LORazepam (ATIVAN) 0.5 MG tablet   5. Chronic obstructive pulmonary disease, unspecified COPD type (HCC)  CBC Auto Differential    Comprehensive Metabolic Panel   6. Uncontrolled type 2 diabetes mellitus with hyperglycemia (HCC)  CBC Auto Differential    Comprehensive Metabolic Panel   7. Cough  XR CHEST STANDARD (2 VW)    CBC Auto Differential    Comprehensive Metabolic Panel   8. Encounter for therapeutic drug level monitoring   POCT Rapid Drug Screen   9. Other specified disorders of bone density and structure, unspecified site   Vitamin D 25 Hydroxy             Plan:      No follow-ups on file. Orders Placed This Encounter   Procedures    XR CHEST STANDARD (2 VW)     Standing Status:   Future     Standing Expiration Date:   5/24/2020    CBC Auto Differential     Standing Status:   Future     Standing Expiration Date:   5/23/2020    Comprehensive Metabolic Panel     Standing Status:   Future     Standing Expiration Date:   5/24/2020    Lipid Panel     Standing Status:   Future     Standing Expiration Date:   5/23/2020     Order Specific Question:   Is Patient Fasting?/# of Hours     Answer:   yes    TSH with Reflex     Standing Status:   Future     Standing Expiration Date:   5/23/2020    Hemoglobin A1C     Standing Status:   Future     Standing Expiration Date:   5/24/2020    Vitamin D 25 Hydroxy     Standing Status:   Future     Standing Expiration Date:   5/24/2020    POCT Rapid Drug Screen    POCT microalbumin    POCT glycosylated hemoglobin (Hb A1C)     Orders Placed This Encounter   Medications    LORazepam (ATIVAN) 0.5 MG tablet     Sig: Take 1 tablet by mouth every 8 hours as needed for Anxiety for up to 30 days.      Dispense:  30 tablet     Refill:  1    traMADol (ULTRAM) 50 MG tablet     Sig: Take 1 tablet by mouth every 6 hours as needed for Pain for up to 30 days. Intended supply: 3 days. Take lowest dose possible to manage pain     Dispense:  30 tablet     Refill:  0     Reduce doses taken as pain becomes manageable     COPD - Recent bronchitis. Cxr showed COPD. No GHOTRA. Cough resolved.       HTN - Taking meds and tolerating well. Cont tx.      DM - Taking metformin. Was taking [place] and [place] however stopped d/t cost. States cannot afford it. A1C now 9.1. Add invokana - pt did not tolerate. Diet and exercise encouraged. A1C 8.7. Use to take insulin. Prefers to go on insulin vs expensive meds. Doing well. AM . On toujeo 22. A1C 8.1. A1C 7.7 9/18/17. Increase insulin to 24 units. Pt refusing insulin increase or switch in type of insulin d/t cost.  Understands risk of uncontrolled DM.      HLD - Cont meds. Diet and exercise encouraged. Will repeat and recheck.      Anxiety - On ativan prn.       OA - States prescribed ultram > 1 yr ago. Takes about 1 / mo. D/w pt concerns regarding this med and this in combination with ativan. Will rx few for very limited use.      Normal monofilament exam 8/17/16.      Pt refusing colonoscopy. Understands benefit      EDWARD 7/2015 nl. New order given.      Flu through pharmacy 10/2016.      Pneumovax 23 1/20/15    Prevnar 13 - 6/12/17      Zostavax 2015.     Tdap - 11/11/16      Pelvic - Pt states done with GYN exams    Microalb - 9/2017    Foot exam - Refusing      Patient given educational materials - see patient instructions. Discussed use, benefit, and side effects of prescribed medications. All patient questions answered. Pt voiced understanding. Reviewed health maintenance. Instructed to continue current medications, diet and exercise. Patient agreed with treatment plan. Follow up as directed.      Electronically signed by Francisco Javier Freitas PA-C on 5/24/2019 at 11:04 AM

## 2019-05-31 LAB
ALBUMIN: 4.1 G/DL (ref 3.5–5)
ALP BLD-CCNC: 75 U/L (ref 45–117)
ALT SERPL-CCNC: 16 U/L (ref 12–78)
AST SERPL-CCNC: 15 U/L (ref 15–37)
BASOPHILS ABSOLUTE: 0.1 10'3/UL (ref 0.1–0.2)
BASOPHILS RELATIVE PERCENT: 1 % (ref 0–1.7)
BILIRUB SERPL-MCNC: 0.5 MG/DL (ref 0–1)
BUN BLDV-MCNC: 18 MG/DL (ref 7–18)
CALCIUM SERPL-MCNC: 10.1 MG/DL (ref 8.5–10.1)
CHLORIDE BLD-SCNC: 99 MMOL/L (ref 97–107)
CHOLESTEROL/HDL RELATIVE RISK: 5.25
CHOLESTEROL: 168 MG/DL (ref 100–200)
CO2: 22 MMOL/L (ref 21–32)
CREAT SERPL-MCNC: 1 MG/DL (ref 0.55–1.02)
CREATININE URINE: 60.1 MG/DL
EOSINOPHILS ABSOLUTE: 0.2 10'3/UL (ref 0–0.4)
EOSINOPHILS RELATIVE PERCENT: 2.7 % (ref 0–6.4)
ESTIMATED AVERAGE GLUCOSE: 252 MG/DL
GFR CALCULATED: 58
GLUCOSE: 326 MG/DL (ref 70–99)
HBA1C MFR BLD: 10.4 % (ref 4–5.6)
HCT VFR BLD CALC: 41 % (ref 34.6–44.1)
HDLC SERPL-MCNC: 32 MG/DL (ref 45–60)
HEMOGLOBIN: 13.3 G/DL (ref 11.7–14.9)
LDL CHOLESTEROL: 67 MG/DL (ref 88–198)
LYMPHOCYTES ABSOLUTE: 1.8 10'3/UL (ref 0.5–3.5)
LYMPHOCYTES RELATIVE PERCENT: 22.9 % (ref 17.4–45.9)
MCH RBC QN AUTO: 30.7 PG (ref 27.8–33.2)
MCHC RBC AUTO-ENTMCNC: 32.5 G/DL (ref 32.7–34.8)
MCV RBC AUTO: 94.7 FL (ref 83–97.4)
MICROALBUMIN UR-MCNC: 63 MG/L
MICROALBUMIN/CREAT UR-RTO: 115.3 MG/G
MONOCYTES ABSOLUTE: 0.6 10'3/UL (ref 0.2–0.8)
MONOCYTES RELATIVE PERCENT: 8.3 % (ref 4.4–12)
NEUTROPHILS ABSOLUTE: 5 10'3/UL (ref 1.5–5.6)
NEUTROPHILS SEGMENTED: 65.1 % (ref 41.2–72.1)
PDW BLD-RTO: 13.4 % (ref 12.2–15.8)
PLATELET # BLD: 251 10'3/UL (ref 122–359)
PMV BLD AUTO: 8.1 FL (ref 7.6–10.6)
POTASSIUM SERPL-SCNC: 4.6 MMOL/L (ref 3.5–5.1)
RBC # BLD: 4.33 10'6/UL (ref 3.85–4.88)
SODIUM BLD-SCNC: 134 MMOL/L (ref 136–145)
TOTAL PROTEIN: 6.8 G/DL (ref 6.4–8.2)
TRIGL SERPL-MCNC: 347 MG/DL
TSH SERPL DL<=0.05 MIU/L-ACNC: 1.3 UIU/ML (ref 0.36–3.74)
VITAMIN D 25-HYDROXY: 39 NG/ML (ref 30–100)
VITAMIN D2, 25 HYDROXY: <4 NG/ML
VITAMIN D3,25 HYDROXY: 39 NG/ML
VLDLC SERPL CALC-MCNC: 69 MG/DL (ref 5–35)
WBC: 7.7 10'3/UL (ref 3.2–9.3)

## 2019-06-05 ENCOUNTER — TELEPHONE (OUTPATIENT)
Dept: FAMILY MEDICINE CLINIC | Age: 74
End: 2019-06-05

## 2019-06-05 NOTE — TELEPHONE ENCOUNTER
Patient stated that she is not going to keep increasing her insulin due to the cost and it does not seem to work. . She stated that she will only increase it for tonight. Ana Paula Beltre

## 2019-06-05 NOTE — TELEPHONE ENCOUNTER
Patient called in stating BS reading is 291. Please advise thank you!         Last visit: 5/24/19  Last Med refill:     Next Visit Date:  Future Appointments   Date Time Provider Heather Thomas   11/25/2019 11:15 AM BERTHA Diamond [facility] [address] Maintenance   Topic Date Due    Colon cancer screen colonoscopy  11/22/1994    Diabetic retinal exam  09/15/2020 (Originally 12/7/2017)    Shingles Vaccine (1 of 2) 04/04/2023 (Originally 11/22/1994)    Diabetic foot exam  12/13/2023 (Originally 7/7/2016)    A1C test (Diabetic or Prediabetic)  08/31/2019    Diabetic microalbuminuria test  05/31/2020    Lipid screen  05/31/2020    Potassium monitoring  05/31/2020    Creatinine monitoring  05/31/2020    Breast cancer screen  10/01/2020    DTaP/Tdap/Td vaccine (2 - Td) 11/11/2026    DEXA (modify frequency per FRAX score)  Completed    Flu vaccine  Completed    Pneumococcal 65+ years Vaccine  Completed       Hemoglobin A1C (%)   Date Value   05/31/2019 10.4 (H)   05/24/2019 5.5   02/18/2019 8.6             ( goal A1C is < 7)   Microalbumin, Random Urine (mg/L)   Date Value   05/31/2019 63.0     LDL Cholesterol (mg/dL)   Date Value   05/31/2019 67 (L)   11/29/2018 81 (L)     LDL Calculated (mg/dL)   Date Value   04/21/2017 84   08/29/2016 63       (goal LDL is <100)   AST (U/L)   Date Value   05/31/2019 15     ALT (U/L)   Date Value   05/31/2019 16     BUN (mg/dL)   Date Value   05/31/2019 18     BP Readings from Last 3 Encounters:   05/24/19 137/84   02/18/19 124/70   08/20/18 126/72          (goal 120/80)    All Future Testing planned in [facility]  Lab Frequency Next Occurrence   [facility]WARD DIGITAL SCREENING AUGMENTED BILATERAL Once 10/23/2018   XR CHEST STANDARD (2 VW) Once 05/24/2019   CBC Auto Differential Once 06/23/2019   Comprehensive Metabolic Panel Once 09/57/9470   Lipid Panel Once 06/23/2019   TSH with Reflex Once 06/23/2019   Hemoglobin A1C Once 05/24/2020   Vitamin D 25 Hydroxy Once 05/24/2020   Microalbumin, Ur Once 05/24/2020               Patient Active Problem List:     Diabetes [facility])     Hyperlipidemia     Osteoarthritis     Anxiety     COPD (chronic obstructive pulmonary disease) ([facility]     Uncontrolled diabetes mellitus ([facility]

## 2019-06-06 NOTE — TELEPHONE ENCOUNTER
Called patient to let her know we will do referral and she is declining referral at the time and states that she will not increase her insulin, she will do diet and exercise and control her own glucose. I did advise that if she changes her mind, we are still strongly recommending an endo referral for her glucose management. She states she will let us know if she needs our help.

## 2019-08-05 DIAGNOSIS — E11.9 DIABETES MELLITUS TYPE 2 IN NONOBESE (HCC): ICD-10-CM

## 2019-08-05 NOTE — TELEPHONE ENCOUNTER
Received [employer] request for refill of patient's  medication. Medication pended for physician review, please advise. Thank you!     Last visit: 5/24/2019  Last Med refill: 2/18/2019      Next Visit Date:  Future Appointments   Date Time Provider Heather Thomas   11/25/2019 11:15 AM BERTHA Mccray [facility] [address] Maintenance   Topic Date Due    Colon cancer screen colonoscopy  11/22/1994    Annual Wellness Visit (AWV)  11/22/2007    Diabetic retinal exam  09/15/2020 (Originally 12/7/2017)    Shingles Vaccine (1 of 2) 04/04/2023 (Originally 11/22/1994)    Diabetic foot exam  12/13/2023 (Originally 7/7/2016)    A1C test (Diabetic or Prediabetic)  08/31/2019    Flu vaccine (1) 09/01/2019    Diabetic microalbuminuria test  05/31/2020    Lipid screen  05/31/2020    Potassium monitoring  05/31/2020    Creatinine monitoring  05/31/2020    Breast cancer screen  10/01/2020    DTaP/Tdap/Td vaccine (2 - Td) 11/11/2026    DEXA (modify frequency per FRAX score)  Completed    Pneumococcal 65+ years Vaccine  Completed       Hemoglobin A1C (%)   Date Value   05/31/2019 10.4 (H)   05/24/2019 5.5   02/18/2019 8.6             ( goal A1C is < 7)   Microalbumin, Random Urine (mg/L)   Date Value   05/31/2019 63.0     LDL Cholesterol (mg/dL)   Date Value   05/31/2019 67 (L)   11/29/2018 81 (L)     LDL Calculated (mg/dL)   Date Value   04/21/2017 84   08/29/2016 63       (goal LDL is <100)   AST (U/L)   Date Value   05/31/2019 15     ALT (U/L)   Date Value   05/31/2019 16     BUN (mg/dL)   Date Value   05/31/2019 18     BP Readings from Last 3 Encounters:   05/24/19 137/84   02/18/19 124/70   08/20/18 126/72          (goal 120/80)    All Future Testing planned in [facility]  Lab Frequency Next Occurrence   [facility]WARD DIGITAL SCREENING AUGMENTED BILATERAL Once 10/23/2018   XR CHEST STANDARD (2 VW) Once 01/01/2020   CBC Auto Differential Once 01/01/2020   Comprehensive Metabolic Panel Once 32/41/2605

## 2019-08-30 NOTE — TELEPHONE ENCOUNTER
Pharmacy calling requesting new script to be sent over for test strips. Due to patient having medicare a diagnosis must be associated and they cannot take a verbal. Please Advise!

## 2019-09-03 ENCOUNTER — TELEPHONE (OUTPATIENT)
Dept: FAMILY MEDICINE CLINIC | Age: 74
End: 2019-09-03

## 2019-09-03 DIAGNOSIS — E11.65 UNCONTROLLED TYPE 2 DIABETES MELLITUS WITH HYPERGLYCEMIA (HCC): ICD-10-CM

## 2019-09-03 DIAGNOSIS — E11.65 UNCONTROLLED TYPE 2 DIABETES MELLITUS WITH HYPERGLYCEMIA (HCC): Primary | ICD-10-CM

## 2019-09-03 NOTE — TELEPHONE ENCOUNTER
They do not have record of diagnosis code on last script, a new script with diagnosis code with diagnosis will need to be sent to pharmacy per [facility] pharmacist, Code will need to be on actual script (associated with diagnosis) and not in comments.  Please advise thank you

## 2019-09-10 NOTE — TELEPHONE ENCOUNTER
Patient called in regards to script for test strips. I contacted the pharmacy they are not able to accept faxed script that was printed it has to be signed, correct DX code and dated we do not have that script. Can you please resend this electronically the last script was sent to the wrong pharmacy. Please advise thank you! Pended order thank you!

## 2019-09-11 NOTE — TELEPHONE ENCOUNTER
It has been sent 4 times in the last 30 days and I did send it again yesterday. I can hand write it and we can try it that way, but it is not that we have not been sending it.

## 2019-12-11 ENCOUNTER — OFFICE VISIT (OUTPATIENT)
Dept: FAMILY MEDICINE CLINIC | Age: 74
End: 2019-12-11
Payer: MEDICARE

## 2019-12-11 VITALS
TEMPERATURE: 98.2 F | SYSTOLIC BLOOD PRESSURE: 113 MMHG | HEART RATE: 84 BPM | WEIGHT: 169.4 LBS | HEIGHT: 62 IN | BODY MASS INDEX: 31.17 KG/M2 | DIASTOLIC BLOOD PRESSURE: 71 MMHG | OXYGEN SATURATION: 94 %

## 2019-12-11 DIAGNOSIS — Z12.39 SCREENING FOR BREAST CANCER: ICD-10-CM

## 2019-12-11 DIAGNOSIS — Z12.11 SCREENING FOR COLON CANCER: ICD-10-CM

## 2019-12-11 DIAGNOSIS — E11.65 UNCONTROLLED TYPE 2 DIABETES MELLITUS WITH HYPERGLYCEMIA (HCC): ICD-10-CM

## 2019-12-11 DIAGNOSIS — E11.69 TYPE 2 DIABETES MELLITUS WITH OTHER SPECIFIED COMPLICATION, WITH LONG-TERM CURRENT USE OF INSULIN (HCC): Primary | ICD-10-CM

## 2019-12-11 DIAGNOSIS — M15.9 PRIMARY OSTEOARTHRITIS INVOLVING MULTIPLE JOINTS: ICD-10-CM

## 2019-12-11 DIAGNOSIS — J44.9 CHRONIC OBSTRUCTIVE PULMONARY DISEASE, UNSPECIFIED COPD TYPE (HCC): ICD-10-CM

## 2019-12-11 DIAGNOSIS — E78.5 HYPERLIPIDEMIA, UNSPECIFIED HYPERLIPIDEMIA TYPE: ICD-10-CM

## 2019-12-11 DIAGNOSIS — Z79.4 TYPE 2 DIABETES MELLITUS WITH OTHER SPECIFIED COMPLICATION, WITH LONG-TERM CURRENT USE OF INSULIN (HCC): Primary | ICD-10-CM

## 2019-12-11 DIAGNOSIS — F41.9 ANXIETY: ICD-10-CM

## 2019-12-11 PROCEDURE — 99214 OFFICE O/P EST MOD 30 MIN: CPT | Performed by: PHYSICIAN ASSISTANT

## 2019-12-11 PROCEDURE — 3017F COLORECTAL CA SCREEN DOC REV: CPT | Performed by: PHYSICIAN ASSISTANT

## 2019-12-11 PROCEDURE — G8399 PT W/DXA RESULTS DOCUMENT: HCPCS | Performed by: PHYSICIAN ASSISTANT

## 2019-12-11 PROCEDURE — 1123F ACP DISCUSS/DSCN MKR DOCD: CPT | Performed by: PHYSICIAN ASSISTANT

## 2019-12-11 PROCEDURE — 3023F SPIROM DOC REV: CPT | Performed by: PHYSICIAN ASSISTANT

## 2019-12-11 PROCEDURE — 4040F PNEUMOC VAC/ADMIN/RCVD: CPT | Performed by: PHYSICIAN ASSISTANT

## 2019-12-11 PROCEDURE — 1036F TOBACCO NON-USER: CPT | Performed by: PHYSICIAN ASSISTANT

## 2019-12-11 PROCEDURE — G8417 CALC BMI ABV UP PARAM F/U: HCPCS | Performed by: PHYSICIAN ASSISTANT

## 2019-12-11 PROCEDURE — 2022F DILAT RTA XM EVC RTNOPTHY: CPT | Performed by: PHYSICIAN ASSISTANT

## 2019-12-11 PROCEDURE — G8482 FLU IMMUNIZE ORDER/ADMIN: HCPCS | Performed by: PHYSICIAN ASSISTANT

## 2019-12-11 PROCEDURE — 3046F HEMOGLOBIN A1C LEVEL >9.0%: CPT | Performed by: PHYSICIAN ASSISTANT

## 2019-12-11 PROCEDURE — 1090F PRES/ABSN URINE INCON ASSESS: CPT | Performed by: PHYSICIAN ASSISTANT

## 2019-12-11 PROCEDURE — G8427 DOCREV CUR MEDS BY ELIG CLIN: HCPCS | Performed by: PHYSICIAN ASSISTANT

## 2019-12-11 PROCEDURE — G8926 SPIRO NO PERF OR DOC: HCPCS | Performed by: PHYSICIAN ASSISTANT

## 2019-12-11 RX ORDER — TRAMADOL HYDROCHLORIDE 50 MG/1
50 TABLET ORAL EVERY 6 HOURS PRN
Qty: 30 TABLET | Refills: 1 | Status: SHIPPED | OUTPATIENT
Start: 2019-12-11 | End: 2020-06-08 | Stop reason: SDUPTHER

## 2019-12-11 RX ORDER — LORAZEPAM 0.5 MG/1
TABLET ORAL
Refills: 1 | COMMUNITY
Start: 2019-10-29 | End: 2019-12-11 | Stop reason: SDUPTHER

## 2019-12-11 RX ORDER — LORAZEPAM 0.5 MG/1
0.5 TABLET ORAL EVERY 8 HOURS PRN
Qty: 30 TABLET | Refills: 1 | Status: SHIPPED | OUTPATIENT
Start: 2019-12-11 | End: 2020-03-09 | Stop reason: SDUPTHER

## 2019-12-11 ASSESSMENT — ENCOUNTER SYMPTOMS
BLURRED VISION: 0
CHEST TIGHTNESS: 0
EYE ITCHING: 0
ABDOMINAL PAIN: 0
COUGH: 0
RHINORRHEA: 0
EYE DISCHARGE: 0
SORE THROAT: 0
VISUAL CHANGE: 0
SHORTNESS OF BREATH: 0
SINUS PRESSURE: 0
NAUSEA: 0
DIARRHEA: 0
VOMITING: 0

## 2020-01-14 NOTE — TELEPHONE ENCOUNTER
Last visit: 12/11/2019  Last Med refill: 12/2019  Does patient have enough medication for 72 hours: No    Next Visit Date:  Future Appointments   Date Time Provider Heather Thomas   6/8/2020  1:45 PM Ilsa Mendoza Maiesha [facility] [address] Maintenance   Topic Date Due    Colon cancer screen colonoscopy  11/22/1994    Annual Wellness Visit (AWV)  05/29/2019    A1C test (Diabetic or Prediabetic)  08/31/2019    Diabetic retinal exam  09/15/2020 (Originally 12/7/2017)    Shingles Vaccine (1 of 2) 04/04/2023 (Originally 11/22/1994)    Diabetic foot exam  12/13/2023 (Originally 7/7/2016)    Lipid screen  05/31/2020    Potassium monitoring  05/31/2020    Creatinine monitoring  05/31/2020    DTaP/Tdap/Td vaccine (2 - Td) 11/11/2026    DEXA (modify frequency per FRAX score)  Completed    Flu vaccine  Completed    Pneumococcal 65+ years Vaccine  Completed       Hemoglobin A1C (%)   Date Value   05/31/2019 10.4 (H)   05/24/2019 5.5   02/18/2019 8.6             ( goal A1C is < 7)   Microalbumin, Random Urine (mg/L)   Date Value   05/31/2019 63.0     LDL Cholesterol (mg/dL)   Date Value   05/31/2019 67 (L)   11/29/2018 81 (L)     LDL Calculated (mg/dL)   Date Value   04/21/2017 84   08/29/2016 63       (goal LDL is <100)   AST (U/L)   Date Value   05/31/2019 15     ALT (U/L)   Date Value   05/31/2019 16     BUN (mg/dL)   Date Value   05/31/2019 18     BP Readings from Last 3 Encounters:   12/11/19 113/71   05/24/19 137/84   02/18/19 124/70          (goal 120/80)    All Future Testing planned in [facility]  Lab Frequency Next Occurrence   XR CHEST STANDARD (2 VW) Once 01/01/2020   CBC Auto Differential Once 01/01/2020   Comprehensive Metabolic Panel Once 69/23/1714   Lipid Panel Once 01/01/2020   TSH with Reflex Once 01/01/2020   Hemoglobin A1C Once 05/24/2020   Vitamin D 25 Hydroxy Once 05/24/2020   Microalbumin, Ur Once 05/24/2020   CBC Auto Differential Once 12/11/2019   Comprehensive Metabolic Panel Once 12/11/2019   Lipid Panel Once 12/11/2019   TSH with Reflex Once 12/11/2019   Hemoglobin A1C Once 12/11/2019   [facility]WARD DIGITAL SCREEN W CAD BILATERAL Once 01/10/2020   Cologuard (For External Results Only) Once 12/11/2019               Patient Active Problem List:     Diabetes ([address]     Hyperlipidemia     Osteoarthritis     Anxiety     COPD (chronic obstructive pulmonary disease) ([facility]     Uncontrolled diabetes mellitus ([facility]

## 2020-02-10 ENCOUNTER — OFFICE VISIT (OUTPATIENT)
Dept: FAMILY MEDICINE CLINIC | Age: 75
End: 2020-02-10
Payer: MEDICARE

## 2020-02-10 VITALS
BODY MASS INDEX: 31.28 KG/M2 | HEART RATE: 75 BPM | SYSTOLIC BLOOD PRESSURE: 133 MMHG | OXYGEN SATURATION: 96 % | HEIGHT: 62 IN | TEMPERATURE: 97.2 F | WEIGHT: 170 LBS | DIASTOLIC BLOOD PRESSURE: 80 MMHG

## 2020-02-10 PROCEDURE — 99214 OFFICE O/P EST MOD 30 MIN: CPT | Performed by: FAMILY MEDICINE

## 2020-02-10 RX ORDER — PRAVASTATIN SODIUM 80 MG/1
80 TABLET ORAL DAILY
Qty: 30 TABLET | Refills: 0 | Status: SHIPPED | OUTPATIENT
Start: 2020-02-10 | End: 2020-05-28 | Stop reason: SDUPTHER

## 2020-02-10 ASSESSMENT — PATIENT HEALTH QUESTIONNAIRE - PHQ9
SUM OF ALL RESPONSES TO PHQ QUESTIONS 1-9: 0
SUM OF ALL RESPONSES TO PHQ9 QUESTIONS 1 & 2: 0
1. LITTLE INTEREST OR PLEASURE IN DOING THINGS: 0
2. FEELING DOWN, DEPRESSED OR HOPELESS: 0
SUM OF ALL RESPONSES TO PHQ QUESTIONS 1-9: 0

## 2020-02-10 ASSESSMENT — ENCOUNTER SYMPTOMS
GASTROINTESTINAL NEGATIVE: 1
EYES NEGATIVE: 1
RESPIRATORY NEGATIVE: 1

## 2020-02-10 NOTE — PROGRESS NOTES
(mg/L)   Date Value   2019 63.0     LDL Cholesterol (mg/dL)   Date Value   2019 67 (L)   2018 81 (L)   2018 65 (L)     LDL Calculated (mg/dL)   Date Value   2017 84   2016 63       (goal LDL is <100)   AST (U/L)   Date Value   2019 15     ALT (U/L)   Date Value   2019 16     BUN (mg/dL)   Date Value   2019 18     BP Readings from Last 3 Encounters:   02/10/20 133/80   19 113/71   19 137/84          (goal 120/80)    Past Medical History:   Diagnosis Date    Anxiety     Hyperlipidemia     Osteoarthritis     Type II or unspecified type diabetes mellitus without mention of complication, not stated as uncontrolled       Past Surgical History:   Procedure Laterality Date    CARDIAC SURGERY      KNEE SURGERY      TONSILLECTOMY         Family History   Problem Relation Age of Onset    Cancer Mother         hodgkins    Diabetes Father        Social History     Tobacco Use    Smoking status: Former Smoker     Packs/day: 0.50     Years: 30.00     Pack years: 15.00     Types: Cigarettes     Start date: 1979     Last attempt to quit: 2007     Years since quittin.7    Smokeless tobacco: Never Used   Substance Use Topics    Alcohol use: No      Current Outpatient Medications   Medication Sig Dispense Refill    blood glucose test strips (ONE TOUCH ULTRA TEST) strip 1 each by Does not apply route daily As needed.  100 strip 11    ONE TOUCH LANCETS MISC 1 each by Does not apply route 3 times daily 100 each 11    metFORMIN (GLUCOPHAGE) 1000 MG tablet Take 1 tablet twice daily 180 tablet 1    Insulin Pen Needle (B-D ULTRAFINE III SHORT PEN) 31G X 8 MM MISC Inject 1 each into the skin daily May substitute with any brand 100 each 11    Insulin Glargine, 2 Unit Dial, 300 UNIT/ML SOPN Inject 24 Units into the skin nightly 3 pen 1    pravastatin (PRAVACHOL) 80 MG tablet Take 1 tablet by mouth daily 30 tablet 0    BD PEN NEEDLE LJ U/F 32G X 4 MM MISC USE EVERY DAY 90 each 11    aspirin 81 MG tablet Take 162 mg by mouth      Niacin (VITAMIN B-3 PO) Take 500 mg by mouth      Lutein 10 MG TABS Take 10 mg by mouth      Blood Glucose Monitoring Suppl KIT Please dispense machine with lancets and testing strips, her diagnoses code is 159.75 1 kit 0    folic acid (FOLVITE) 1 MG tablet Take 1 mg by mouth daily      carvedilol (COREG) 12.5 MG tablet Take 1 tablet by mouth 2 times daily (with meals).  digoxin (LANOXIN) 125 MCG tablet Take 1 tablet by mouth daily.  lisinopril (PRINIVIL;ZESTRIL) 5 MG tablet Take 1 tablet by mouth 2 times daily. No current facility-administered medications for this visit. Allergies   Allergen Reactions    Docosahexaenoic Acid (Dha) Diarrhea    Mineral Oil Diarrhea    Niacin Itching     Slow Niacin    Nicotine     Other Diarrhea    Sulfa Antibiotics      Other reaction(s): Intolerance-unknown  Other reaction(s): Intolerance-unknown    Vitamin E Diarrhea       Health Maintenance   Topic Date Due    Colon cancer screen colonoscopy  11/22/1994    A1C test (Diabetic or Prediabetic)  08/31/2019    Annual Wellness Visit (AWV)  02/10/2020    Diabetic retinal exam  09/15/2020 (Originally 12/7/2017)    Hepatitis B vaccine (1 of 3 - Risk 3-dose series) 02/10/2021 (Originally [birth year]22/1964)    Shingles Vaccine (1 of 2) 04/04/2023 (Originally 11/22/1994)    Diabetic foot exam  12/13/2023 (Originally 7/7/2016)    Lipid screen  05/31/2020    Potassium monitoring  05/31/2020    Creatinine monitoring  05/31/2020    DTaP/Tdap/Td vaccine (2 - Td) 11/11/2026    DEXA (modify frequency per FRAX score)  Completed    Flu vaccine  Completed    Pneumococcal 65+ years Vaccine  Completed    Hepatitis A vaccine  Aged Out    Hib vaccine  Aged Out    Meningococcal (ACWY) vaccine  Aged Out       Subjective:     Review of Systems   Constitutional: Negative. HENT: Negative. Eyes: Negative. Respiratory: Negative. exam:  Monofilament sensation: normal  (minimum of 5 random plantar locations tested, avoiding callused areas - > 1 area with absence of sensation is + for neuropathy)    Plus at least one of the following:  Pulses: normal,   Pinprick: Intact and N/A  Proprioception: Intact  Vibration (128 Hz): N/A  /80   Pulse 75   Temp 97.2 °F (36.2 °C) (Oral)   Ht 5' 2\" (1.575 m)   Wt 170 lb (77.1 kg)   SpO2 96%   BMI 31.09 kg/m²     Assessment:       Diagnosis Orders   1. Type 2 diabetes mellitus with other specified complication, with long-term current use of insulin ([facility])  Comprehensive Metabolic Panel, Fasting    Hemoglobin A1C    HM DIABETES FOOT EXAM   2. Chronic obstructive pulmonary disease, unspecified COPD type ([facility] [facility]     3. Hyperlipidemia, unspecified hyperlipidemia type  Lipid, Fasting    Comprehensive Metabolic Panel, Fasting   4. Anxiety  TSH With Reflex Ft4   5. Other screening mammogram  [facility] DIGITAL SCREEN W CAD BILATERAL             Plan:     COPD - No current concerns      HTN - Taking meds and tolerating well. Cont tx.      DM - Taking metformin. On Toujeo 24 units daily as well. Last A1C per patient 12/11/2019 was 8. Is going to see if insulin will be approved by insurance. Understands risk of uncontrolled DM. Will get eye exam records from [facility] and update in patient's chart      HLD - Cont meds. Diet and exercise encouraged. Will repeat and recheck.      Anxiety - On ativan prn.       OA -D/w pt concerns regarding this med and this in combination with ativan. Will rx few for very limited use.      Normal monofilament exam 02/10/2020.      Pt refusing colonoscopy. Understands benefit      [facility] 7/2015 nl.  New order given and faxed to [facility] per patient request      Flu through pharmacy 2019 this season got high dose      Pneumovax 23 1/20/15    Prevnar 13 - 6/12/17      Zostavax 2015.     Tdap - 11/11/16      Pelvic - Pt states done with GYN exams    Microalb - ordered    Return in about 6 months (around 8/10/2020). Orders Placed This Encounter   Procedures    [facility] DIGITAL SCREEN W CAD BILATERAL     Standing Status:   Future     Standing Expiration Date:   8/10/2020     Scheduling Instructions:      Please fax to [facility] mammogram     Order Specific Question:   Reason for exam:     Answer:   screening Z12.31    Lipid, Fasting     Standing Status:   Future     Standing Expiration Date:   2/10/2021    Comprehensive Metabolic Panel, Fasting     Standing Status:   Future     Standing Expiration Date:   2/10/2021    Hemoglobin A1C     Standing Status:   Future     Standing Expiration Date:   2/10/2021    TSH With Reflex Ft4     Standing Status:   Future     Standing Expiration Date:   2/10/2021    HM DIABETES FOOT EXAM     Orders Placed This Encounter   Medications    blood glucose test strips (ONE TOUCH ULTRA TEST) strip     Si each by Does not apply route daily As needed. Dispense:  100 strip     Refill:  11     Please consider 90 day supplies to promote better adherence    ONE TOUCH LANCETS MISC     Si each by Does not apply route 3 times daily     Dispense:  100 each     Refill:  11     Dx: Uncontrolled DM. ICD E 11.65  CHECK BS DAILY    metFORMIN (GLUCOPHAGE) 1000 MG tablet     Sig: Take 1 tablet twice daily     Dispense:  180 tablet     Refill:  1    Insulin Pen Needle (B-D ULTRAFINE III SHORT PEN) 31G X 8 MM MISC     Sig: Inject 1 each into the skin daily May substitute with any brand     Dispense:  100 each     Refill:  11    Insulin Glargine, 2 Unit Dial, 300 UNIT/ML SOPN     Sig: Inject 24 Units into the skin nightly     Dispense:  3 pen     Refill:  1    pravastatin (PRAVACHOL) 80 MG tablet     Sig: Take 1 tablet by mouth daily     Dispense:  30 tablet     Refill:  0       Patient given educational materials - see patient instructions. Discussed use, benefit, and side effects of prescribed medications. All patientquestions answered. Pt voiced understanding. Reviewed health maintenance. Instructedto continue current medications, diet and exercise. Patient agreed with treatmentplan. Follow up as directed.      Electronically signed by Cj Lerma MD on 2/10/2020 at 11:22 AM

## 2020-02-13 NOTE — TELEPHONE ENCOUNTER
Medication request received via fax. Please advise. Thank you.     Last visit: 2/10/20  Last Med refill: 1/10/2021      Next Visit Date:  Future Appointments   Date Time Provider Heather Thomas   6/8/2020  1:45 PM Jose Guadalupe COLBY [facility]   8/10/2020  9:45 AM MD José Rincon [facility] [address] Maintenance   Topic Date Due    Colon cancer screen colonoscopy  11/22/1994    A1C test (Diabetic or Prediabetic)  08/31/2019    Annual Wellness Visit (AWV)  02/10/2020    Diabetic retinal exam  09/15/2020 (Originally 12/7/2017)    Hepatitis B vaccine (1 of 3 - Risk 3-dose series) 02/10/2021 (Originally 11/22/1964)    Shingles Vaccine (1 of 2) 04/04/2023 (Originally 11/22/1994)    Diabetic foot exam  12/13/2023 (Originally 7/7/2016)    Lipid screen  05/31/2020    Potassium monitoring  05/31/2020    Creatinine monitoring  05/31/2020    DTaP/Tdap/Td vaccine (2 - Td) 11/11/2026    DEXA (modify frequency per FRAX score)  Completed    Flu vaccine  Completed    Pneumococcal 65+ years Vaccine  Completed    Hepatitis A vaccine  Aged Out    Hib vaccine  Aged Out    Meningococcal (ACWY) vaccine  Aged Out       Hemoglobin A1C (%)   Date Value   05/31/2019 10.4 (H)   05/24/2019 5.5   02/18/2019 8.6             ( goal A1C is < 7)   Microalbumin, Random Urine (mg/L)   Date Value   05/31/2019 63.0     LDL Cholesterol (mg/dL)   Date Value   05/31/2019 67 (L)   11/29/2018 81 (L)     LDL Calculated (mg/dL)   Date Value   04/21/2017 84   08/29/2016 63       (goal LDL is <100)   AST (U/L)   Date Value   05/31/2019 15     ALT (U/L)   Date Value   05/31/2019 16     BUN (mg/dL)   Date Value   05/31/2019 18     BP Readings from Last 3 Encounters:   02/10/20 133/80   12/11/19 113/71   05/24/19 137/84          (goal 120/80)    All Future Testing planned in [facility]  Lab Frequency Next Occurrence   XR CHEST STANDARD (2 VW) Once 01/01/2020   Comprehensive Metabolic Panel Once 98/08/1467   Lipid Panel Once

## 2020-02-25 LAB
ALBUMIN: 4.2 G/DL (ref 3.2–4.6)
ALP BLD-CCNC: 59 U/L (ref 40–150)
ALT SERPL-CCNC: 18 U/L (ref 0–55)
ANION GAP SERPL CALCULATED.3IONS-SCNC: 12 MEQ/L (ref 5–13)
AST SERPL-CCNC: 16 U/L (ref 5–34)
BILIRUB SERPL-MCNC: 0.4 MG/DL (ref 0–1)
BUN BLDV-MCNC: 18 MG/DL (ref 7–18)
CALCIUM SERPL-MCNC: 9 MG/DL (ref 8.4–10.2)
CHLORIDE BLD-SCNC: 103 MMOL/L (ref 98–107)
CHOLESTEROL/HDL RELATIVE RISK: 4.61
CHOLESTEROL: 152 MG/DL
CO2: 23 MMOL/L (ref 23–31)
CREAT SERPL-MCNC: 0.8 MG/DL (ref 0.57–1.11)
ESTIMATED AVERAGE GLUCOSE: 226 MG/DL
GFR CALCULATED: > 60
GLUCOSE: 235 MG/DL (ref 70–99)
HBA1C MFR BLD: 9.5 % (ref 4–5.6)
HDLC SERPL-MCNC: 33 MG/DL (ref 45–60)
LDL CHOLESTEROL: 69 MG/DL
POTASSIUM SERPL-SCNC: 4.5 MMOL/L (ref 3.5–5.1)
SODIUM BLD-SCNC: 138 MMOL/L (ref 136–145)
TOTAL PROTEIN: 6.5 G/DL (ref 6.4–8.2)
TRIGL SERPL-MCNC: 249 MG/DL
TSH SERPL DL<=0.05 MIU/L-ACNC: 1.84 UIU/ML (ref 0.36–3.74)
VLDLC SERPL CALC-MCNC: 49 MG/DL (ref 5–35)

## 2020-03-09 RX ORDER — LORAZEPAM 0.5 MG/1
0.5 TABLET ORAL EVERY 8 HOURS PRN
Qty: 30 TABLET | Refills: 1 | Status: SHIPPED | OUTPATIENT
Start: 2020-03-09 | End: 2020-06-08 | Stop reason: SDUPTHER

## 2020-03-17 RX ORDER — PEN NEEDLE, DIABETIC 30 GX3/16"
NEEDLE, DISPOSABLE MISCELLANEOUS
Qty: 100 EACH | Refills: 1 | Status: SHIPPED | OUTPATIENT
Start: 2020-03-17 | End: 2020-05-28 | Stop reason: SDUPTHER

## 2020-03-19 RX ORDER — LANCETS 33 GAUGE
EACH MISCELLANEOUS
Qty: 100 EACH | Refills: 0 | Status: SHIPPED
Start: 2020-03-19 | End: 2020-03-31 | Stop reason: SDUPTHER

## 2020-03-19 NOTE — TELEPHONE ENCOUNTER
Faxed request for pended medication please advise thank you!       Next Visit Date:  Future Appointments   Date Time Provider Heather Lipscombi   4/1/2020  1:45 PM MD José Henderson  [facility]   8/10/2020  9:45 AM MD José Henderson [facility] [address] Maintenance   Topic Date Due    Colon cancer screen colonoscopy  11/22/1994    Annual Wellness Visit (AWV)  02/10/2020    Diabetic retinal exam  09/15/2020 (Originally 12/7/2017)    Hepatitis B vaccine (1 of 3 - Risk 3-dose series) 02/10/2021 (Originally 11/22/1964)    Shingles Vaccine (1 of 2) 04/04/2023 (Originally 11/22/1994)    Diabetic foot exam  12/13/2023 (Originally 7/7/2016)    A1C test (Diabetic or Prediabetic)  05/25/2020    Lipid screen  02/25/2021    Potassium monitoring  02/25/2021    Creatinine monitoring  02/25/2021    DTaP/Tdap/Td vaccine (2 - Td) 11/11/2026    DEXA (modify frequency per FRAX score)  Completed    Flu vaccine  Completed    Pneumococcal 65+ years Vaccine  Completed    Hepatitis A vaccine  Aged Out    Hib vaccine  Aged Out    Meningococcal (ACWY) vaccine  Aged Out       Hemoglobin A1C (%)   Date Value   02/25/2020 9.5 (H)   05/31/2019 10.4 (H)   05/24/2019 5.5             ( goal A1C is < 7)   Microalbumin, Random Urine (mg/L)   Date Value   05/31/2019 63.0     LDL Cholesterol (mg/dL)   Date Value   02/25/2020 69   05/31/2019 67 (L)     LDL Calculated (mg/dL)   Date Value   04/21/2017 84   08/29/2016 63       (goal LDL is <100)   AST (U/L)   Date Value   02/25/2020 16     ALT (U/L)   Date Value   02/25/2020 18     BUN (mg/dL)   Date Value   02/25/2020 18     BP Readings from Last 3 Encounters:   02/10/20 133/80   12/11/19 113/71   05/24/19 137/84          (goal 120/80)    All Future Testing planned in [facility]  Lab Frequency Next Occurrence   XR CHEST STANDARD (2 VW) Once 01/01/2020   Comprehensive Metabolic Panel Once 38/34/7452   Lipid Panel Once 01/01/2020   Vitamin D 25 Hydroxy Once 05/24/2020

## 2020-03-20 ENCOUNTER — TELEPHONE (OUTPATIENT)
Dept: FAMILY MEDICINE CLINIC | Age: 75
End: 2020-03-20

## 2020-05-28 RX ORDER — FOLIC ACID 1 MG/1
1 TABLET ORAL DAILY
Qty: 30 TABLET | Refills: 1 | Status: SHIPPED | OUTPATIENT
Start: 2020-05-28 | End: 2020-06-02 | Stop reason: SDUPTHER

## 2020-05-28 RX ORDER — CARVEDILOL 12.5 MG/1
12.5 TABLET ORAL 2 TIMES DAILY WITH MEALS
Qty: 60 TABLET | Refills: 1 | Status: SHIPPED | OUTPATIENT
Start: 2020-05-28 | End: 2020-06-02 | Stop reason: SDUPTHER

## 2020-05-28 RX ORDER — LISINOPRIL 5 MG/1
5 TABLET ORAL 2 TIMES DAILY
Qty: 30 TABLET | Refills: 1 | Status: SHIPPED | OUTPATIENT
Start: 2020-05-28 | End: 2020-06-02 | Stop reason: SDUPTHER

## 2020-05-28 RX ORDER — PEN NEEDLE, DIABETIC 30 GX3/16"
NEEDLE, DISPOSABLE MISCELLANEOUS
Qty: 100 EACH | Refills: 1 | Status: SHIPPED | OUTPATIENT
Start: 2020-05-28 | End: 2020-08-25 | Stop reason: SDUPTHER

## 2020-05-28 RX ORDER — DIGOXIN 125 MCG
125 TABLET ORAL DAILY
Qty: 30 TABLET | Refills: 1 | Status: SHIPPED | OUTPATIENT
Start: 2020-05-28 | End: 2020-06-02 | Stop reason: SDUPTHER

## 2020-05-28 RX ORDER — PRAVASTATIN SODIUM 80 MG/1
80 TABLET ORAL DAILY
Qty: 30 TABLET | Refills: 0 | Status: SHIPPED | OUTPATIENT
Start: 2020-05-28 | End: 2020-06-02 | Stop reason: SDUPTHER

## 2020-06-02 ENCOUNTER — TELEPHONE (OUTPATIENT)
Dept: FAMILY MEDICINE CLINIC | Age: 75
End: 2020-06-02

## 2020-06-02 RX ORDER — LISINOPRIL 5 MG/1
5 TABLET ORAL 2 TIMES DAILY
Qty: 180 TABLET | Refills: 1 | Status: SHIPPED | OUTPATIENT
Start: 2020-06-02 | End: 2022-02-02 | Stop reason: SDUPTHER

## 2020-06-02 RX ORDER — DIGOXIN 125 MCG
125 TABLET ORAL DAILY
Qty: 90 TABLET | Refills: 1 | Status: SHIPPED | OUTPATIENT
Start: 2020-06-02 | End: 2022-02-02 | Stop reason: SDUPTHER

## 2020-06-02 RX ORDER — FOLIC ACID 1 MG/1
1 TABLET ORAL DAILY
Qty: 90 TABLET | Refills: 1 | Status: SHIPPED | OUTPATIENT
Start: 2020-06-02 | End: 2021-01-06 | Stop reason: SDUPTHER

## 2020-06-02 RX ORDER — PRAVASTATIN SODIUM 80 MG/1
80 TABLET ORAL DAILY
Qty: 90 TABLET | Refills: 1 | Status: SHIPPED | OUTPATIENT
Start: 2020-06-02 | End: 2022-02-02 | Stop reason: SDUPTHER

## 2020-06-02 RX ORDER — CARVEDILOL 12.5 MG/1
12.5 TABLET ORAL 2 TIMES DAILY WITH MEALS
Qty: 180 TABLET | Refills: 1 | Status: SHIPPED | OUTPATIENT
Start: 2020-06-02 | End: 2022-02-02 | Stop reason: SDUPTHER

## 2020-06-08 RX ORDER — LORAZEPAM 0.5 MG/1
0.5 TABLET ORAL EVERY 8 HOURS PRN
Qty: 30 TABLET | Refills: 1 | Status: SHIPPED | OUTPATIENT
Start: 2020-06-08 | End: 2021-01-11 | Stop reason: SDUPTHER

## 2020-06-08 RX ORDER — TRAMADOL HYDROCHLORIDE 50 MG/1
50 TABLET ORAL EVERY 6 HOURS PRN
Qty: 30 TABLET | Refills: 1 | Status: SHIPPED | OUTPATIENT
Start: 2020-06-08 | End: 2021-04-26 | Stop reason: SDUPTHER

## 2020-06-08 NOTE — TELEPHONE ENCOUNTER
Next Visit Date:  Future Appointments   Date Time Provider Heather Martha   8/5/2020 12:15 PM [facility]   8/10/2020  9:45 AM Archie Tracy MD [facility] [place] [address] Maintenance   Topic Date Due    Colon cancer screen colonoscopy  11/22/1994    Annual Wellness Visit (AWV)  02/10/2020    A1C test (Diabetic or Prediabetic)  05/25/2020    Diabetic retinal exam  09/15/2020 (Originally 12/7/2017)    Shingles Vaccine (1 of 2) 04/04/2023 (Originally 11/22/1994)    Diabetic foot exam  12/13/2023 (Originally 7/7/2016)    Lipid screen  02/25/2021    Potassium monitoring  02/25/2021    Creatinine monitoring  02/25/2021    DTaP/Tdap/Td vaccine (2 - Td) 11/11/2026    DEXA (modify frequency per FRAX score)  Completed    Flu vaccine  Completed    Pneumococcal 65+ years Vaccine  Completed    Hepatitis A vaccine  Aged Out    Hib vaccine  Aged Out    Meningococcal (ACWY) vaccine  Aged Out       Hemoglobin A1C (%)   Date Value   02/25/2020 9.5 (H)   05/31/2019 10.4 (H)   05/24/2019 5.5             ( goal A1C is < 7)   Microalbumin, Random Urine (mg/L)   Date Value   05/31/2019 63.0     LDL Cholesterol (mg/dL)   Date Value   02/25/2020 69   05/31/2019 67 (L)     LDL Calculated (mg/dL)   Date Value   04/21/2017 84   08/29/2016 63       (goal LDL is <100)   AST (U/L)   Date Value   02/25/2020 16     ALT (U/L)   Date Value   02/25/2020 18     BUN (mg/dL)   Date Value   02/25/2020 18     BP Readings from Last 3 Encounters:   02/10/20 133/80   12/11/19 113/71   05/24/19 137/84          (goal 120/80)    All Future Testing planned in [facility]  Lab Frequency Next Occurrence   Lipid, Fasting Once 02/10/2020   Comprehensive Metabolic Panel, Fasting Once 02/10/2020   Hemoglobin A1C Once 02/10/2020   TSH With Reflex Ft4 Once 02/10/2020   [facility] DIGITAL SCREEN W CAD BILATERAL Once 02/24/2020               Patient Active Problem List:     Diabetes [facility])     Hyperlipidemia     Osteoarthritis Anxiety     COPD (chronic obstructive pulmonary disease) ([facility]     Uncontrolled diabetes mellitus ([facility]

## 2020-06-09 LAB
ANION GAP SERPL CALCULATED.3IONS-SCNC: 12 MEQ/L (ref 5–13)
BUN BLDV-MCNC: 13 MG/DL (ref 7–18)
CALCIUM SERPL-MCNC: 9.2 MG/DL (ref 8.4–10.2)
CHLORIDE BLD-SCNC: 105 MMOL/L (ref 98–107)
CHOLESTEROL/HDL RELATIVE RISK: 4.75
CHOLESTEROL: 152 MG/DL
CO2: 24 MMOL/L (ref 23–31)
CREAT SERPL-MCNC: 0.9 MG/DL (ref 0.57–1.11)
DIGOXIN LEVEL: 0.3 NG/ML (ref 0.9–2)
GFR CALCULATED: > 60
GLUCOSE: 190 MG/DL (ref 70–99)
HDLC SERPL-MCNC: 32 MG/DL (ref 45–60)
LDL CHOLESTEROL: 81 MG/DL
POTASSIUM SERPL-SCNC: 4.6 MMOL/L (ref 3.5–5.1)
SODIUM BLD-SCNC: 141 MMOL/L (ref 136–145)
TRIGL SERPL-MCNC: 196 MG/DL
VLDLC SERPL CALC-MCNC: 39 MG/DL (ref 5–35)

## 2020-07-20 ENCOUNTER — TELEPHONE (OUTPATIENT)
Dept: GASTROENTEROLOGY | Age: 75
End: 2020-07-20

## 2020-07-20 RX ORDER — INSULIN GLARGINE 300 U/ML
INJECTION, SOLUTION SUBCUTANEOUS
Qty: 6 ML | Refills: 0 | Status: SHIPPED | OUTPATIENT
Start: 2020-07-20 | End: 2020-11-05

## 2020-07-20 NOTE — TELEPHONE ENCOUNTER
Electronic medication refill request. Pharmacy on file. Please advise.         Next Visit Date:  Future Appointments   Date Time Provider Heather Thomas   8/5/2020 12:15 PM MD José Diaz  [facility]   8/10/2020  9:45 AM MD José Diaz [facility] [place] [facility]"" [address] Maintenance   Topic Date Due    Annual Wellness Visit (AWV)  02/10/2020    A1C test (Diabetic or Prediabetic)  05/25/2020    Diabetic retinal exam  09/15/2020 (Originally 12/7/2017)    Shingles Vaccine (1 of 2) 04/04/2023 (Originally 11/22/1994)    Diabetic foot exam  12/13/2023 (Originally 7/7/2016)    Flu vaccine (1) 09/01/2020    Lipid screen  06/09/2021    Potassium monitoring  06/09/2021    Creatinine monitoring  06/09/2021    Colon cancer screen fecal DNA test (Cologuard)  06/15/2023    DTaP/Tdap/Td vaccine (2 - Td) 11/11/2026    DEXA (modify frequency per FRAX score)  Completed    Pneumococcal 65+ years Vaccine  Completed    Hepatitis A vaccine  Aged Out    Hib vaccine  Aged Out    Meningococcal (ACWY) vaccine  Aged Out       Hemoglobin A1C (%)   Date Value   02/25/2020 9.5 (H)   05/31/2019 10.4 (H)   05/24/2019 5.5             ( goal A1C is < 7)   Microalbumin, Random Urine (mg/L)   Date Value   05/31/2019 63.0     LDL Cholesterol (mg/dL)   Date Value   06/09/2020 81   02/25/2020 69     LDL Calculated (mg/dL)   Date Value   04/21/2017 84   08/29/2016 63       (goal LDL is <100)   AST (U/L)   Date Value   02/25/2020 16     ALT (U/L)   Date Value   02/25/2020 18     BUN (mg/dL)   Date Value   06/09/2020 13     BP Readings from Last 3 Encounters:   02/10/20 133/80   12/11/19 113/71   05/24/19 137/84          (goal 120/80)    All Future Testing planned in [facility]  Lab Frequency Next Occurrence   Lipid, Fasting Once 02/10/2020   Comprehensive Metabolic Panel, Fasting Once 02/10/2020   Hemoglobin A1C Once 02/10/2020   TSH With Reflex Ft4 Once 02/10/2020   EDWARD DIGITAL SCREEN W CAD BILATERAL Once 02/24/2020               Patient Active Problem List:     Diabetes ([facility]     Hyperlipidemia     Osteoarthritis     Anxiety     COPD (chronic obstructive pulmonary disease) ([facility]     Uncontrolled diabetes mellitus ([facility]

## 2020-08-05 ENCOUNTER — OFFICE VISIT (OUTPATIENT)
Dept: FAMILY MEDICINE CLINIC | Age: 75
End: 2020-08-05
Payer: MEDICARE

## 2020-08-05 VITALS
TEMPERATURE: 97.3 F | OXYGEN SATURATION: 95 % | HEIGHT: 62 IN | WEIGHT: 166.6 LBS | SYSTOLIC BLOOD PRESSURE: 127 MMHG | BODY MASS INDEX: 30.66 KG/M2 | HEART RATE: 70 BPM | DIASTOLIC BLOOD PRESSURE: 75 MMHG

## 2020-08-05 PROBLEM — I34.0 NONRHEUMATIC MITRAL (VALVE) INSUFFICIENCY: Status: ACTIVE | Noted: 2020-08-05

## 2020-08-05 PROBLEM — I25.10 CORONARY ATHEROSCLEROSIS: Status: ACTIVE | Noted: 2020-08-05

## 2020-08-05 PROBLEM — I25.5 ISCHEMIC CARDIOMYOPATHY: Status: ACTIVE | Noted: 2020-08-05

## 2020-08-05 PROBLEM — Z95.1 PRESENCE OF AORTOCORONARY BYPASS GRAFT: Status: ACTIVE | Noted: 2020-08-05

## 2020-08-05 PROCEDURE — 4040F PNEUMOC VAC/ADMIN/RCVD: CPT | Performed by: FAMILY MEDICINE

## 2020-08-05 PROCEDURE — 3017F COLORECTAL CA SCREEN DOC REV: CPT | Performed by: FAMILY MEDICINE

## 2020-08-05 PROCEDURE — G0438 PPPS, INITIAL VISIT: HCPCS | Performed by: FAMILY MEDICINE

## 2020-08-05 PROCEDURE — 1123F ACP DISCUSS/DSCN MKR DOCD: CPT | Performed by: FAMILY MEDICINE

## 2020-08-05 PROCEDURE — 3046F HEMOGLOBIN A1C LEVEL >9.0%: CPT | Performed by: FAMILY MEDICINE

## 2020-08-05 RX ORDER — EZETIMIBE 10 MG/1
10 TABLET ORAL DAILY
COMMUNITY
Start: 2020-06-09

## 2020-08-05 ASSESSMENT — PATIENT HEALTH QUESTIONNAIRE - PHQ9
SUM OF ALL RESPONSES TO PHQ QUESTIONS 1-9: 0
SUM OF ALL RESPONSES TO PHQ QUESTIONS 1-9: 0

## 2020-08-05 ASSESSMENT — LIFESTYLE VARIABLES: HOW OFTEN DO YOU HAVE A DRINK CONTAINING ALCOHOL: 0

## 2020-08-05 NOTE — PATIENT INSTRUCTIONS
(Maybe you're afraid of having pain, losing your independence, or being kept alive by machines.)  · Where would you prefer to die? (Your home? A hospital? A nursing home?)  · Do you want to donate your organs when you die? · Do you want certain [employer] practices performed before you die? When should you call for help? Be sure to contact your doctor if you have any questions. Where can you learn more? Go to [email or web address] and sign in to your Mobile Ads account. Enter R264 in the uiu box to learn more about \"Advance Directives: Care Instructions. \"     If you do not have an account, please click on the \"Sign Up Now\" link. Current as of: December 9, 2019               Content Version: 12.5  © 4728-9006 Healthwise, Incorporated. Care instructions adapted under license by [facility] ([facility]). If you have questions about a medical condition or this instruction, always ask your healthcare professional. [place] [address] any warranty or liability for your use of this information. Learning About Medical Power of   What is a medical power of ? A medical power of , also called a durable power of  for health care, is one type of the legal forms called advance directives. It lets you name the person you want to make treatment decisions for you if you can't speak or decide for yourself. The person you choose is called your health care agent. This person is also called a health care proxy or health care surrogate. A medical power of  may be called something else in your state. How do you choose a health care agent? Choose your health care agent carefully. This person may or may not be a family member. Talk to the person before you make your final decision. Make sure he or she is comfortable with this responsibility. It's a good idea to choose someone who:  · Is at least 25years old.   · Knows you well and understands what makes life meaningful for you. · Understands your Roman Catholic and moral values. · Will do what you want, not what he or she wants. · Will be able to make difficult choices at a stressful time. · Will be able to refuse or stop treatment, if that is what you would want, even if you could die. · Will be firm and confident with health professionals if needed. · Will ask questions to get needed information. · Lives near you or agrees to travel to you if needed. Your family may help you make medical decisions while you can still be part of that process. But it's important to choose one person to be your health care agent in case you aren't able to make decisions for yourself. If you don't fill out the legal form and name a health care agent, the decisions your family can make may be limited. A health care agent may be called something else in your state. Who will make decisions for you if you don't have a health care agent? If you don't have a health care agent or a living will, you may not get the care you want. Decisions may be made by family members who disagree about your medical care. Or decisions may be made by a medical professional who doesn't know you well. In some cases, a  makes the decisions. When you name a health care agent, it is very clear who has the power to make health decisions for you. How do you name a health care agent? You name your health care agent on a legal form. This form is usually called a medical power of . Ask your hospital, state bar association, or office on aging where to find these forms. You must sign the form to make it legal. Some states require you to get the form notarized. This means that a person called a  watches you sign the form and then he or she signs the form. Some states also require that two or more witnesses sign the form. Be sure to tell your family members and doctors who your health care agent is.   Where can you learn more? Go to https://chpepiceweb.[email or web address] org and sign in to your Moveline account. Enter [id number] in the [facility] box to learn more about \"Learning About [address]. \"     If you do not have an account, please click on the \"Sign Up Now\" link. Current as of: December 9, 2019               Content Version: 12.5  © 7818-1318 [employer]. Care instructions adapted under license by [facility] ([facility]). If you have questions about a medical condition or this instruction, always ask your healthcare professional. [address] any warranty or liability for your use of this information. Learning About Living Perroy  What is a living will? A living will, also called a declaration, is a legal form. It tells your family and your doctor your wishes when you can't speak for yourself. It's used by the health professionals who will treat you as you near the end of your life or if you get seriously hurt or ill. If you put your wishes in writing, your loved ones and others will know what kind of care you want. They won't need to guess. This can ease your mind and be helpful to others. And you can change or cancel your living will at any time. A living will is not the same as an estate or property will. An estate will explains what you want to happen with your money and property after you die. How do you use it? A living will is used to describe the kinds of treatment or life support you want as you near the end of your life or if you get seriously hurt or ill. Keep these facts in mind about living johnson. · Your living will is used only if you can't speak or make decisions for yourself. Most often, one or more doctors must certify that you can't speak or decide for yourself before your living will takes effect. · If you get better and can speak for yourself again, you can accept or refuse any treatment.  It doesn't matter what you said in your living will. · Some states may limit your right to refuse treatment in certain cases. For example, you may need to clearly state in your living will that you don't want artificial hydration and nutrition, such as being fed through a tube. Is a living will a legal document? A living will is a legal document. Each state has its own laws about living johnson. And a living will may be called something else in your state. Here are some things to know about living johnson. · You don't need an  to complete a living will. But legal advice can be helpful if your state's laws are unclear. It can also help if your health history is complicated or your family can't agree on what should be in your living will. · You can change your living will at any time. Some people find that their wishes about end-of-life care change as their health changes. If you make big changes to your living will, complete a new form. · If you move to another state, make sure that your living will is legal in the state where you now live. In most cases, doctors will respect your wishes even if you have a form from a different state. · You might use a universal form that has been approved by many states. This kind of form can sometimes be filled out and stored online. Your digital copy will then be available wherever you have a connection to the internet. The doctors and nurses who need to treat you can find it right away. · Your state may offer an online registry. This is another place where you can store your living will online. · It's a good idea to get your living will notarized. This means using a person called a  to watch two people sign, or witness, your living will. What should you know when you create a living will? Here are some questions to ask yourself as you make your living will:  · Do you know enough about life support methods that might be used?  If not, talk to your doctor so you know what might be done if you can't breathe on your own, your heart stops, or you can't swallow. · What things would you still want to be able to do after you receive life-support methods? Would you want to be able to walk? To speak? To eat on your own? To live without the help of machines? · Do you want certain Mosque practices performed if you become very ill? · If you have a choice, where do you want to be cared for? In your home? At a hospital or nursing home? · If you have a choice at the end of your life, where would you prefer to die? At home? In a hospital or nursing home? Somewhere else? · Would you prefer to be buried or cremated? · Do you want your organs to be donated after you die? What should you do with your living will? · Make sure that your family members and your health care agent have copies of your living will (also called a declaration). · Give your doctor a copy of your living will. Ask him or her to keep it as part of your medical record. If you have more than one doctor, make sure that each one has a copy. · Put a copy of your living will where it can be easily found. For example, some people may put a copy on their refrigerator door. If you are using a digital copy, be sure your doctor, family members, and health care agent know how to find and access it. Where can you learn more? Go to [email or web address] org and sign in to your ACE*COMM account. Enter B987 in the [facility] box to learn more about \"Learning About Living Jose Roberto Fox. \"     If you do not have an account, please click on the \"Sign Up Now\" link. Current as of: December 9, 2019               Content Version: 12.5  © 2850-8714 Healthwise, Incorporated. Care instructions adapted under license by [facility] ([facility]). If you have questions about a medical condition or this instruction, always ask your healthcare professional. [address] any warranty or liability for your use of this information. butter, ½ ounce nuts or seeds, or ¼ cup of cooked beans equals 1 ounce of meat. · Learn how to read food labels for serving sizes and ingredients. Fast-food and convenience-food meals often contain few or no fruits or vegetables. Make sure you eat some fruits and vegetables to make the meal more nutritious. · Look at your food diary. For each food group, add up what you have eaten and then divide the total by the number of days. This will give you an idea of how much you are eating from each food group. See if you can find some ways to change your diet to make it more healthy. Start small  · Do not try to make dramatic changes to your diet all at once. You might feel that you are missing out on your favorite foods and then be more likely to fail. · Start slowly, and gradually change your habits. Try some of the following:  ? Use whole wheat bread instead of white bread. ? Use nonfat or low-fat milk instead of whole milk. ? Eat brown rice instead of white rice, and eat whole wheat pasta instead of white-flour pasta. ? Try low-fat cheeses and low-fat yogurt. ? Add more fruits and vegetables to meals and have them for snacks. ? Add lettuce, tomato, cucumber, and onion to sandwiches. ? Add fruit to yogurt and cereal.  Enjoy food  · You can still eat your favorite foods. You just may need to eat less of them. If your favorite foods are high in fat, salt, and sugar, limit how often you eat them, but do not cut them out entirely. · Eat a wide variety of foods. Make healthy choices when eating out  · The type of restaurant you choose can help you make healthy choices. Even fast-food chains are now offering more low-fat or healthier choices on the menu. · Choose smaller portions, or take half of your meal home. · When eating out, try:  ? A veggie pizza with a whole wheat crust or grilled chicken (instead of sausage or pepperoni).   ? Pasta with roasted vegetables, grilled chicken, or marinara sauce instead of cream sauce. ? A vegetable wrap or grilled chicken wrap. ? Broiled or poached food instead of fried or breaded items. Make healthy choices easy  · Buy packaged, prewashed, ready-to-eat fresh vegetables and fruits, such as baby carrots, salad mixes, and chopped or shredded broccoli and cauliflower. · Buy packaged, presliced fruits, such as melon or pineapple. · Choose 100% fruit or vegetable juice instead of soda. Limit juice intake to 4 to 6 oz (½ to ¾ cup) a day. · Blend low-fat yogurt, fruit juice, and canned or frozen fruit to make a smoothie for breakfast or a snack. Where can you learn more? Go to https://[employer].Cameo. org and sign in to your coresystems account. Enter G306 in the 25eight box to learn more about \"Eating Healthy Foods: Care Instructions. \"     If you do not have an account, please click on the \"Sign Up Now\" link. Current as of: August 22, 2019               Content Version: 12.5  © 2524-0008 Healthwise, Incorporated. Care instructions adapted under license by [facility] ([facility]). If you have questions about a medical condition or this instruction, always ask your healthcare professional. [address] any warranty or liability for your use of this information. Personalized Preventive Plan for Anastasia Noyola - 8/5/2020  Medicare offers a range of preventive health benefits. Some of the tests and screenings are paid in full while other may be subject to a deductible, co-insurance, and/or copay. Some of these benefits include a comprehensive review of your medical history including lifestyle, illnesses that may run in your family, and various assessments and screenings as appropriate. After reviewing your medical record and screening and assessments performed today your provider may have ordered immunizations, labs, imaging, and/or referrals for you.   A list of these orders (if applicable) as well as your Preventive Care list are included within your After Visit Summary for your review. Other Preventive Recommendations:    · A preventive eye exam performed by an eye specialist is recommended every 1-2 years to screen for glaucoma; cataracts, macular degeneration, and other eye disorders. · A preventive dental visit is recommended every 6 months. · Try to get at least 150 minutes of exercise per week or 10,000 steps per day on a pedometer . · Order or download the FREE \"Exercise & Physical Activity: Your Everyday Guide\" from [employer]. Call 0-406.284.3156 or search [employer] online. · You need 8644-2971 mg of calcium and 9773-7555 IU of vitamin D per day. It is possible to meet your calcium requirement with diet alone, but a vitamin D supplement is usually necessary to meet this goal.  · When exposed to the sun, use a sunscreen that protects against both UVA and UVB radiation with an SPF of 30 or greater. Reapply every 2 to 3 hours or after sweating, drying off with a towel, or swimming. · Always wear a seat belt when traveling in a car. Always wear a helmet when riding a bicycle or motorcycle.

## 2020-08-05 NOTE — PROGRESS NOTES
Medicare Annual Wellness Visit  Name: Savana Francis Date: 2020   MRN: [id number] Sex: Female   Age: 75 y.o. Ethnicity: Non-/Non    : 1945 Race: Juan Jose Bravo is here for Medicare AWV    Screenings for behavioral, psychosocial and functional/safety risks, and cognitive dysfunction are all negative except as indicated below. These results, as well as other patient data from the [address] form, are documented in Flowsheets linked to this Encounter. Allergies   Allergen Reactions    Docosahexaenoic Acid (Dha) Diarrhea    Mineral Oil Diarrhea    Niacin Itching     Slow Niacin    Nicotine     Other Diarrhea    Sulfa Antibiotics      Other reaction(s): Intolerance-unknown  Other reaction(s): Intolerance-unknown    Vitamin E Diarrhea       Prior to Visit Medications    Medication Sig Taking?  Authorizing Provider   ezetimibe (ZETIA) 10 MG tablet Take 10 mg by mouth daily Yes Historical Provider, MD NIKKI MCKEON SOLOSTAR 300 UNIT/ML SOPN INJECT 24 UNITS SUBCUTANEOUSLY NIGHTLY Yes Carl Serrano MD   pravastatin (PRAVACHOL) 80 MG tablet Take 1 tablet by mouth daily Yes Carl Serrano MD   carvedilol (COREG) 12.5 MG tablet Take 1 tablet by mouth 2 times daily (with meals) Yes Carl Serrano MD   digoxin (LANOXIN) 125 MCG tablet Take 1 tablet by mouth daily Yes Carl Serrano MD   folic acid (FOLVITE) 1 MG tablet Take 1 tablet by mouth daily Yes Carl Serrano MD   lisinopril (PRINIVIL;ZESTRIL) 5 MG tablet Take 1 tablet by mouth 2 times daily Yes Carl Serrano MD   blood glucose test strips (ONE TOUCH ULTRA TEST) strip 1 each by Does not apply route daily Dx E 11.9 patient test TID Yes Carl Serrano MD   Insulin Pen Needle (PEN NEEDLES) 32G X 4 MM MISC Inject insulin SQ daily into skin Yes Carl Serrano MD   Lancets 30G MISC 1 each by Does not apply route daily Yes Carl Serrano MD   metFORMIN (GLUCOPHAGE) 1000 MG tablet Take 1 tablet twice daily Yes Carl Serrano MD ONE TOUCH LANCETS MISC 1 each by Does not apply route 3 times daily Dx E 11.9 Yes Brenda Johnson MD   aspirin 81 MG tablet Take 162 mg by mouth Yes Historical Provider, MD   Niacin (VITAMIN B-3 PO) Take 500 mg by mouth Yes Historical Provider, MD   Lutein 10 MG TABS Take 10 mg by mouth Yes Historical Provider, MD   Blood Glucose Monitoring Suppl KIT Please dispense machine with lancets and testing strips, her diagnoses code is 250.00 Yes Roman Serrato PA-C       Past Medical History:   Diagnosis Date    Anxiety     Coronary atherosclerosis 8/5/2020    Hyperlipidemia     Osteoarthritis     Type II or unspecified type diabetes mellitus without mention of complication, not stated as uncontrolled        Past Surgical History:   Procedure Laterality Date    CARDIAC SURGERY      KNEE SURGERY      TONSILLECTOMY         Family History   Problem Relation Age of Onset    Cancer Mother         hodgkins    Diabetes Father        CareTeam (Including outside providers/suppliers regularly involved in providing care):   Patient Care Team:  Brenda Johnson MD as PCP - General (Family Medicine)  Brenda Johnson MD as PCP - [address] [place], [place] [address]kellie Spenceled Provider    Wt Readings from Last 3 Encounters:   08/05/20 166 lb 9.6 oz (75.6 kg)   02/10/20 170 lb (77.1 kg)   12/11/19 169 lb 6.4 oz (76.8 kg)     Vitals:    08/05/20 1200   BP: 127/75   Pulse: 70   Temp: 97.3 °F (36.3 °C)   TempSrc: Skin   SpO2: 95%   Weight: 166 lb 9.6 oz (75.6 kg)   Height: 5' 2\" (1.575 m)     Body mass index is 30.47 kg/m². Based upon direct observation of the patient, evaluation of cognition reveals recent and remote memory intact.     General Appearance: alert and oriented to person, place and time, well developed and well- nourished, in no acute distress  Head: normocephalic and atraumatic  Eyes: pupils equal, round, and reactive to light, extraocular eye movements intact, conjunctivae normal  Neck: supple and non-tender without mass, no thyromegaly or thyroid nodules, no cervical lymphadenopathy  Pulmonary/Chest: clear to auscultation bilaterally- no wheezes, rales or rhonchi, normal air movement, no respiratory distress  Cardiovascular: normal rate, regular rhythm, normal S1 and S2, no murmurs, rubs, clicks, or gallops, distal pulses intact, no carotid bruits  Extremities: no cyanosis, clubbing or edema  Musculoskeletal: normal range of motion, no joint swelling, deformity or tenderness    Patient's complete Health Risk Assessment and screening values have been reviewed and are found in Flowsheets. The following problems were reviewed today and where indicated follow up appointments were made and/or referrals ordered. Positive Risk Factor Screenings with Interventions:     General Health:  General  In general, how would you say your health is?: Good  In the past 7 days, have you experienced any of the following? New or Increased Pain, New or Increased Fatigue, Loneliness, Social Isolation, Stress or Anger?: (!) Stress  Do you get the social and emotional support that you need?: Yes  Do you have a Living Will?: (!) No  General Health Risk Interventions:  · Stress: regular exercise recommended- 3-5 times per week, 30-45 minutes per session    Health Habits/Nutrition:  Health Habits/Nutrition  Do you exercise for at least 20 minutes 2-3 times per week?: Yes  Have you lost any weight without trying in the past 3 months?: No  Do you eat fewer than 2 meals per day?: No  Have you seen a dentist within the past year?: Yes  Body mass index is 30.47 kg/m².   Health Habits/Nutrition Interventions:  · Inadequate physical activity:  patient is not ready to increase his/her physical activity level at this time    Hearing/Vision:  No exam data present  Hearing/Vision  Do you or your family notice any trouble with your hearing?: No  Do you have difficulty driving, watching TV, or doing any of your daily activities because of your eyesight?: (!) Yes  Have you had an eye exam within the past year?: Yes  Hearing/Vision Interventions:  · Vision concerns:  patient encouraged to make appointment with his/her eye specialist    Safety:  Safety  Do you have working smoke detectors?: Yes  Have all throw rugs been removed or fastened?: Yes  Do you have non-slip mats or surfaces in all bathtubs/showers?: Yes  Do all of your stairways have a railing or banister?: (!) No  Are your doorways, halls and stairs free of clutter?: Yes  Do you always fasten your seatbelt when you are in a car?: Yes  Safety Interventions:  · Home safety tips provided    Personalized Preventive Plan   Current Health Maintenance Status  Immunization History   Administered Date(s) Administered    Influenza A ([id number]) Vaccine PF IM 11/12/2009    Influenza Vaccine, unspecified formulation 10/22/2016    Influenza Whole 10/14/2008, 10/20/2009    Influenza, High Dose (Fluzone 65 yrs and older) 10/10/2015, 10/22/2016, 11/11/2016, 09/18/2017, 10/06/2018, 10/31/2019    Pneumococcal Conjugate 13-valent ([id number]) 06/12/2017    Pneumococcal Polysaccharide ([id number]) 01/20/2015    Tdap (Boostrix, Adacel) 11/11/2016        Health Maintenance   Topic Date Due    Annual Wellness Visit (AWV)  02/10/2020    A1C test (Diabetic or Prediabetic)  05/25/2020    Diabetic retinal exam  09/15/2020 (Originally 12/7/2017)    Shingles Vaccine (1 of 2) 04/04/2023 (Originally 11/22/1994)    Diabetic foot exam  12/13/2023 (Originally 7/7/2016)    Flu vaccine (1) 09/01/2020    Lipid screen  06/09/2021    Potassium monitoring  06/09/2021    Creatinine monitoring  06/09/2021    Colon cancer screen fecal DNA test (Cologuard)  06/15/2023    DTaP/Tdap/Td vaccine (2 - Td) 11/11/2026    DEXA (modify frequency per FRAX score)  Completed    Pneumococcal 65+ years Vaccine  Completed    Hepatitis A vaccine  Aged Out    Hib vaccine  Aged Out    Meningococcal (ACWY) vaccine  Aged Out     Recommendations for Mobile Automation Due: see orders and patient instructions/AVS.  . Recommended screening schedule for the next 5-10 years is provided to the patient in written form: see Patient Marlen Camilo was seen today for medicare awv. Diagnoses and all orders for this visit:    Colon cancer screening  -     COLONOSCOPY (Screening)    Type 2 diabetes mellitus with other specified complication, with long-term current use of insulin (HCC)  -     Hemoglobin A1C; Future  -     Microalbumin, Ur; Future    Routine general medical examination at a health care facility                  Advance Care Planning   Advanced Care Planning: Discussed the patients choices for care and treatment in case of a health event that adversely affects decision-making abilities. Also discussed the patients long-term treatment options. Reviewed with the patient the [address] [place] [address] Declaration forms  Reviewed the process of designating a competent adult as an Agent (or -in-fact) that could take make health care decisions for the patient if incompetent. Patient was asked to complete the declaration forms, either acknowledge the forms by a public notary or an eligible witness and provide a signed copy to the practice office. Time spent (minutes): 5 minutes     Obesity Counseling: Assessed behavioral health risks and factors affecting choice of behavior. Suggested weight control approaches, including dietary changes behavioral modification and follow up plan. Provided educational and support documentation. Time spent (minutes): 5 minutes    Cardiovascular Disease Risk Counseling: Assessed the patient's risk to develop cardiovascular disease and reviewed main risk factors.    Reviewed steps to reduce disease risk including:   · Quitting tobacco use, reducing amount smoked, or not starting the habit  · Making healthy food choices  · Being physically active and gradualy increasing activity levels   · Reduce weight and determine a healthy BMI goal  · Monitor blood pressure and treat if higher than 140/90 mmHg  · Maintain blood total cholesterol levels under 5 mmol/l or 190 mg/dl  · Maintain LDL cholesterol levels under 3.0 mmol/l or 115 mg/dl   · Control blood glucose levels  · Consider taking aspirin (75 mg daily), once blood pressure is controlled   Provided a follow up plan.   Time spent (minutes): 5 minutes

## 2020-08-24 NOTE — TELEPHONE ENCOUNTER
Uncontrolled diabetes mellitus ([facility] [address]     Coronary atherosclerosis     Ischemic cardiomyopathy     Mitral valve disease     Nonrheumatic mitral (valve) insufficiency     Presence of aortocoronary bypass graft

## 2020-08-25 RX ORDER — PEN NEEDLE, DIABETIC 32GX 5/32"
NEEDLE, DISPOSABLE MISCELLANEOUS
Qty: 100 EACH | Refills: 0 | Status: SHIPPED | OUTPATIENT
Start: 2020-08-25 | End: 2021-03-15

## 2020-08-31 LAB
CHOLESTEROL/HDL RELATIVE RISK: 3.97
CHOLESTEROL: 135 MG/DL
CREATININE URINE: 55.4 MG/DL
ESTIMATED AVERAGE GLUCOSE: 189 MG/DL
HBA1C MFR BLD: 8.2 % (ref 4–5.6)
HDLC SERPL-MCNC: 34 MG/DL (ref 45–60)
LDL CHOLESTEROL: 61 MG/DL
MICROALBUMIN UR-MCNC: 72.6 MG/L
MICROALBUMIN/CREAT UR-RTO: 144.1 MG/G
TRIGL SERPL-MCNC: 198 MG/DL
VLDLC SERPL CALC-MCNC: 39 MG/DL (ref 5–35)

## 2020-11-05 RX ORDER — INSULIN GLARGINE 300 U/ML
INJECTION, SOLUTION SUBCUTANEOUS
Qty: 6 ML | Refills: 0 | Status: SHIPPED | OUTPATIENT
Start: 2020-11-05 | End: 2021-01-11 | Stop reason: SDUPTHER

## 2020-11-05 NOTE — TELEPHONE ENCOUNTER
Electronic medication refill request. Pharmacy on file. Please advise.       Next Visit Date:  Future Appointments   Date Time Provider Heather Thomas   2/4/2021 11:15 AM [facility] Maintenance   Topic Date Due    Diabetic retinal exam  12/07/2017    Shingles Vaccine (1 of 2) 04/04/2023 (Originally 11/22/1994)    Diabetic foot exam  12/13/2023 (Originally 7/7/2016)    Potassium monitoring  06/09/2021    Creatinine monitoring  06/09/2021    Annual Wellness Visit (AWV)  08/06/2021    A1C test (Diabetic or Prediabetic)  08/31/2021    Lipid screen  08/31/2021    Colon cancer screen fecal DNA test (Cologuard)  06/15/2023    DTaP/Tdap/Td vaccine (2 - Td) 11/11/2026    DEXA (modify frequency per FRAX score)  Completed    Flu vaccine  Completed    Pneumococcal 65+ years Vaccine  Completed    Hepatitis A vaccine  Aged Out    Hib vaccine  Aged Out    Meningococcal (ACWY) vaccine  Aged Out       Hemoglobin A1C (%)   Date Value   08/31/2020 8.2 (H)   02/25/2020 9.5 (H)   05/31/2019 10.4 (H)             ( goal A1C is < 7)   Microalbumin, Random Urine (mg/L)   Date Value   08/31/2020 72.6     LDL Cholesterol (mg/dL)   Date Value   08/31/2020 61   06/09/2020 81     LDL Calculated (mg/dL)   Date Value   04/21/2017 84   08/29/2016 63       (goal LDL is <100)   AST (U/L)   Date Value   02/25/2020 16     ALT (U/L)   Date Value   02/25/2020 18     BUN (mg/dL)   Date Value   06/09/2020 13     BP Readings from Last 3 Encounters:   08/05/20 127/75   02/10/20 133/80   12/11/19 113/71          (goal 120/80)    All Future Testing planned in [facility]  Lab Frequency Next Occurrence   Lipid, Fasting Once 02/10/2020   Comprehensive Metabolic Panel, Fasting Once 02/10/2020   TSH With Reflex Ft4 Once 02/10/2020   Hemoglobin A1C Once 08/05/2020   Microalbumin, Ur Once 08/05/2020               Patient Active Problem List:     Type 2 diabetes mellitus ([address]     Hyperlipidemia     Osteoarthritis Anxiety     COPD (chronic obstructive pulmonary disease) (HCC)     Uncontrolled diabetes mellitus (HCC)     Coronary atherosclerosis     Ischemic cardiomyopathy     Mitral valve disease     Nonrheumatic mitral (valve) insufficiency     Presence of aortocoronary bypass graft

## 2020-12-29 ENCOUNTER — TELEPHONE (OUTPATIENT)
Dept: FAMILY MEDICINE CLINIC | Age: 75
End: 2020-12-29

## 2020-12-29 DIAGNOSIS — U07.1 PNEUMONIA DUE TO COVID-19 VIRUS: Primary | ICD-10-CM

## 2020-12-29 DIAGNOSIS — J12.82 PNEUMONIA DUE TO COVID-19 VIRUS: Primary | ICD-10-CM

## 2020-12-29 NOTE — TELEPHONE ENCOUNTER
Patient  called and stated that the patient was sent home from the hospital with O2 and medication. He will like to finish talking to you from 12/28/20.  Please advise thanks

## 2020-12-30 NOTE — TELEPHONE ENCOUNTER
Please call the patient's  and ask if the patient was readmitted.   I got some stuff from [facility]

## 2020-12-31 NOTE — TELEPHONE ENCOUNTER
I spoke with the patient's . She went to the hospital d/t dehydration from vomiting and nausea. They sent her home with o2. Recommended home care.   Referral done for [facility]

## 2020-12-31 NOTE — TELEPHONE ENCOUNTER
Patient states that she went back to the hospital yesterday, and that the patient was having stomach pain and couldn't keep nothing down. But she is back home and they gave her more oxygen.

## 2020-12-31 NOTE — TELEPHONE ENCOUNTER
I just called them this morning and they said they were taking new patients. I will put new referral in for [facility] as [facility] denied new referrals as well. Please fax to [facility] asap.

## 2021-01-04 ENCOUNTER — TELEPHONE (OUTPATIENT)
Dept: FAMILY MEDICINE CLINIC | Age: 76
End: 2021-01-04

## 2021-01-04 NOTE — TELEPHONE ENCOUNTER
Patient's  called in stating her pneumonia is kicking up again and she is out of medication. Can barely breathe chest is tight, coughing up dark brown and  a yellowish color. Pt notified you will most likely send  Her back to the ER. Please advise thank you!

## 2021-01-06 DIAGNOSIS — I25.10 CORONARY ARTERY DISEASE INVOLVING NATIVE CORONARY ARTERY OF NATIVE HEART WITHOUT ANGINA PECTORIS: ICD-10-CM

## 2021-01-06 RX ORDER — ALBUTEROL SULFATE 90 UG/1
2 AEROSOL, METERED RESPIRATORY (INHALATION) 4 TIMES DAILY PRN
Qty: 3 INHALER | Refills: 1 | Status: SHIPPED | OUTPATIENT
Start: 2021-01-06 | End: 2022-02-02 | Stop reason: SDUPTHER

## 2021-01-06 RX ORDER — FOLIC ACID 1 MG/1
1 TABLET ORAL DAILY
Qty: 90 TABLET | Refills: 1 | Status: SHIPPED | OUTPATIENT
Start: 2021-01-06 | End: 2021-03-15 | Stop reason: SDUPTHER

## 2021-01-06 NOTE — TELEPHONE ENCOUNTER
cardiomyopathy     Mitral valve disease     Nonrheumatic mitral (valve) insufficiency     Presence of aortocoronary bypass graft

## 2021-01-07 ENCOUNTER — TELEPHONE (OUTPATIENT)
Dept: FAMILY MEDICINE CLINIC | Age: 76
End: 2021-01-07

## 2021-01-08 ENCOUNTER — TELEPHONE (OUTPATIENT)
Dept: FAMILY MEDICINE CLINIC | Age: 76
End: 2021-01-08

## 2021-01-08 NOTE — TELEPHONE ENCOUNTER
During prechart Pt stated she is unsure of medications she is taking at this time. She stated that whatever is in her chart should be accurate but she is unsure. Also due to insulin change and steroids she believes she is taking but she is unsure of the name and dosage. Please advise.

## 2021-01-14 ENCOUNTER — TELEPHONE (OUTPATIENT)
Dept: FAMILY MEDICINE CLINIC | Age: 76
End: 2021-01-14

## 2021-01-14 DIAGNOSIS — J12.82 PNEUMONIA DUE TO COVID-19 VIRUS: Primary | ICD-10-CM

## 2021-01-14 DIAGNOSIS — U07.1 PNEUMONIA DUE TO COVID-19 VIRUS: Primary | ICD-10-CM

## 2021-01-14 RX ORDER — PREDNISONE 20 MG/1
20 TABLET ORAL DAILY
Qty: 5 TABLET | Refills: 0 | Status: SHIPPED | OUTPATIENT
Start: 2021-01-14 | End: 2021-01-19

## 2021-01-14 RX ORDER — IPRATROPIUM BROMIDE AND ALBUTEROL SULFATE 2.5; .5 MG/3ML; MG/3ML
1 SOLUTION RESPIRATORY (INHALATION) EVERY 4 HOURS
Qty: 360 ML | Refills: 0 | Status: SHIPPED | OUTPATIENT
Start: 2021-01-14 | End: 2021-05-26 | Stop reason: SDUPTHER

## 2021-01-14 NOTE — TELEPHONE ENCOUNTER
Patient states that since getting out the hospital with pneumonia, she isn't doing good. She states that she is having trouble breathing. Patient would like to know what could she do to help her breathing or could she get an rx for doxycycline.  Please advise thanks

## 2021-01-15 ENCOUNTER — TELEPHONE (OUTPATIENT)
Dept: FAMILY MEDICINE CLINIC | Age: 76
End: 2021-01-15

## 2021-01-15 DIAGNOSIS — U07.1 PNEUMONIA DUE TO COVID-19 VIRUS: Primary | ICD-10-CM

## 2021-01-15 DIAGNOSIS — J12.82 PNEUMONIA DUE TO COVID-19 VIRUS: Primary | ICD-10-CM

## 2021-01-15 NOTE — TELEPHONE ENCOUNTER
Patient needs to use the albuterol every 4 hours. We can send in an order for nebulizer machine if she doesnt have one. Please ask the patient if home care had been coming out. Steroids were sent in still.

## 2021-01-15 NOTE — TELEPHONE ENCOUNTER
Did you guys talk to the patient?    I thought she had a nebulizer machine at home when I talked to her earlier this week

## 2021-01-15 NOTE — TELEPHONE ENCOUNTER
DME order signed, thanks for pending it. Please also notify patient the steroids can make her blood sugars go up temporarily.

## 2021-02-03 ENCOUNTER — VIRTUAL VISIT (OUTPATIENT)
Dept: FAMILY MEDICINE CLINIC | Age: 76
End: 2021-02-03
Payer: MEDICARE

## 2021-02-03 DIAGNOSIS — E11.69 TYPE 2 DIABETES MELLITUS WITH OTHER SPECIFIED COMPLICATION, WITH LONG-TERM CURRENT USE OF INSULIN (HCC): ICD-10-CM

## 2021-02-03 DIAGNOSIS — Z79.4 TYPE 2 DIABETES MELLITUS WITH OTHER SPECIFIED COMPLICATION, WITH LONG-TERM CURRENT USE OF INSULIN (HCC): ICD-10-CM

## 2021-02-03 DIAGNOSIS — J44.9 CHRONIC OBSTRUCTIVE PULMONARY DISEASE, UNSPECIFIED COPD TYPE (HCC): ICD-10-CM

## 2021-02-03 DIAGNOSIS — R06.02 SHORTNESS OF BREATH: ICD-10-CM

## 2021-02-03 DIAGNOSIS — R05.9 COUGH: Primary | ICD-10-CM

## 2021-02-03 PROCEDURE — 99441 PR PHYS/QHP TELEPHONE EVALUATION 5-10 MIN: CPT | Performed by: FAMILY MEDICINE

## 2021-02-03 NOTE — PROGRESS NOTES
Landon Lawson is a 67 y.o. female evaluated via telephone on 2/3/2021. Consent:  She and/or health care decision maker is aware that that she may receive a bill for this telephone service, depending on her insurance coverage, and has provided verbal consent to proceed: Yes      Documentation:  I communicated with the patient and/or health care decision maker about recovery after COVID pneumonia. Details of this discussion including any medical advice provided: The patient had COVID in December 2020. She has had a prolonged course since that time. She has been on and off of oxygen. Her energy has been waxing and waning. Her oxygen is around 94-97% intermittently. She has changed her insulin from once daily to twice daily. She is supposed to be on 28 units once a day but states she would prefer to take twice daily. Encouraged patient to check her blood sugars 3-4 times a day. Also advised her to make sure she was eating a consistent diet. Patient to get A1c and labs done when she is feeling better per her preference. Patient is asking for an x-ray of her lungs to assess resolution of pneumonia. Patient states she will get the x-ray done in a couple of weeks when the weather clears. Order placed. I affirm this is a Patient Initiated Episode with a Patient who has not had a related appointment within my department in the past 7 days or scheduled within the next 24 hours.     Patient identification was verified at the start of the visit: Yes    Total Time: minutes: 5-10 minutes    Note: not billable if this call serves to triage the patient into an appointment for the relevant concern      [address]

## 2021-03-08 ENCOUNTER — TELEPHONE (OUTPATIENT)
Dept: FAMILY MEDICINE CLINIC | Age: 76
End: 2021-03-08

## 2021-03-08 NOTE — TELEPHONE ENCOUNTER
Patient  called and would like to know is it okay to return the O2 tank, since the patient hasn't used it in a week and a half. He didn't know if you had to put in a order to cancel the use of it.  Please advise Thanks

## 2021-03-13 DIAGNOSIS — I25.10 CORONARY ARTERY DISEASE INVOLVING NATIVE CORONARY ARTERY OF NATIVE HEART WITHOUT ANGINA PECTORIS: ICD-10-CM

## 2021-03-15 RX ORDER — PEN NEEDLE, DIABETIC 32GX 5/32"
NEEDLE, DISPOSABLE MISCELLANEOUS
Qty: 100 EACH | Refills: 0 | Status: SHIPPED | OUTPATIENT
Start: 2021-03-15 | End: 2021-08-20 | Stop reason: SDUPTHER

## 2021-03-15 RX ORDER — FOLIC ACID 1 MG/1
1 TABLET ORAL DAILY
Qty: 90 TABLET | Refills: 1 | Status: SHIPPED | OUTPATIENT
Start: 2021-03-15 | End: 2022-02-02 | Stop reason: SDUPTHER

## 2021-03-15 NOTE — TELEPHONE ENCOUNTER
Next Visit Date:  No future appointments.     Health Maintenance   Topic Date Due    Hepatitis C screen  Never done    COVID-19 Vaccine (1) Never done    Shingles Vaccine (1 of 2) 04/04/2023 (Originally 11/22/1994)    Annual Wellness Visit (AWV)  08/06/2021    Lipid screen  08/31/2021    Potassium monitoring  01/04/2022    Creatinine monitoring  01/04/2022    DTaP/Tdap/Td vaccine (2 - Td) 11/11/2026    DEXA (modify frequency per FRAX score)  Completed    Flu vaccine  Completed    Pneumococcal 65+ years Vaccine  Completed    Hepatitis A vaccine  Aged Out    Hib vaccine  Aged Out    Meningococcal (ACWY) vaccine  Aged Out       Hemoglobin A1C (%)   Date Value   08/31/2020 8.2 (H)   02/25/2020 9.5 (H)   05/31/2019 10.4 (H)             ( goal A1C is < 7)   Microalbumin, Random Urine (mg/L)   Date Value   08/31/2020 72.6     LDL Cholesterol (mg/dL)   Date Value   08/31/2020 61   06/09/2020 81     LDL Calculated (mg/dL)   Date Value   04/21/2017 84   08/29/2016 63       (goal LDL is <100)   AST (U/L)   Date Value   01/04/2021 12     ALT (U/L)   Date Value   01/04/2021 19     BUN (mg/dL)   Date Value   01/04/2021 14     BP Readings from Last 3 Encounters:   08/05/20 127/75   02/10/20 133/80   12/11/19 113/71          (goal 120/80)    All Future Testing planned in [facility]  Lab Frequency Next Occurrence   Hemoglobin A1C Once 08/05/2020   Microalbumin, Ur Once 08/05/2020   XR CHEST STANDARD (2 VW) Once 02/03/2021               Patient Active Problem List:     Type 2 diabetes mellitus ([address]     Hyperlipidemia     Osteoarthritis     Anxiety     COPD (chronic obstructive pulmonary disease) ([facility] [address]     Uncontrolled diabetes mellitus (HCC)     Coronary atherosclerosis     Ischemic cardiomyopathy     Mitral valve disease     Nonrheumatic mitral (valve) insufficiency     Presence of aortocoronary bypass graft

## 2021-04-01 DIAGNOSIS — Z79.4 TYPE 2 DIABETES MELLITUS WITH OTHER SPECIFIED COMPLICATION, WITH LONG-TERM CURRENT USE OF INSULIN (HCC): ICD-10-CM

## 2021-04-01 DIAGNOSIS — E11.69 TYPE 2 DIABETES MELLITUS WITH OTHER SPECIFIED COMPLICATION, WITH LONG-TERM CURRENT USE OF INSULIN (HCC): ICD-10-CM

## 2021-04-01 RX ORDER — INSULIN GLARGINE 300 U/ML
INJECTION, SOLUTION SUBCUTANEOUS
Qty: 6 ML | Refills: 0 | Status: SHIPPED | OUTPATIENT
Start: 2021-04-01 | End: 2021-04-15 | Stop reason: SDUPTHER

## 2021-04-01 NOTE — TELEPHONE ENCOUNTER
Patient called to request refills of pended medication, please advise. Thank you! Next Visit Date:  No future appointments.     Health Maintenance   Topic Date Due    Hepatitis C screen  Never done    COVID-19 Vaccine (1) Never done    Shingles Vaccine (1 of 2) 04/04/2023 (Originally 11/22/1994)    Annual Wellness Visit (AWV)  08/06/2021    Lipid screen  08/31/2021    Potassium monitoring  01/04/2022    Creatinine monitoring  01/04/2022    DTaP/Tdap/Td vaccine (2 - Td) 11/11/2026    DEXA (modify frequency per FRAX score)  Completed    Flu vaccine  Completed    Pneumococcal 65+ years Vaccine  Completed    Hepatitis A vaccine  Aged Out    Hib vaccine  Aged Out    Meningococcal (ACWY) vaccine  Aged Out       Hemoglobin A1C (%)   Date Value   08/31/2020 8.2 (H)   02/25/2020 9.5 (H)   05/31/2019 10.4 (H)             ( goal A1C is < 7)   Microalbumin, Random Urine (mg/L)   Date Value   08/31/2020 72.6     LDL Cholesterol (mg/dL)   Date Value   08/31/2020 61   06/09/2020 81     LDL Calculated (mg/dL)   Date Value   04/21/2017 84   08/29/2016 63       (goal LDL is <100)   AST (U/L)   Date Value   01/04/2021 12     ALT (U/L)   Date Value   01/04/2021 19     BUN (mg/dL)   Date Value   01/04/2021 14     BP Readings from Last 3 Encounters:   08/05/20 127/75   02/10/20 133/80   12/11/19 113/71          (goal 120/80)    All Future Testing planned in [facility]  Lab Frequency Next Occurrence   Hemoglobin A1C Once 08/05/2020   Microalbumin, Ur Once 08/05/2020   XR CHEST STANDARD (2 VW) Once 02/03/2021               Patient Active Problem List:     Type 2 diabetes mellitus ([address]     Hyperlipidemia     Osteoarthritis     Anxiety     COPD (chronic obstructive pulmonary disease) ([facility] [address]     Uncontrolled diabetes mellitus (HCC)     Coronary atherosclerosis     Ischemic cardiomyopathy     Mitral valve disease     Nonrheumatic mitral (valve) insufficiency     Presence of aortocoronary bypass graft

## 2021-04-15 DIAGNOSIS — E11.69 TYPE 2 DIABETES MELLITUS WITH OTHER SPECIFIED COMPLICATION, WITH LONG-TERM CURRENT USE OF INSULIN (HCC): ICD-10-CM

## 2021-04-15 DIAGNOSIS — Z79.4 TYPE 2 DIABETES MELLITUS WITH OTHER SPECIFIED COMPLICATION, WITH LONG-TERM CURRENT USE OF INSULIN (HCC): ICD-10-CM

## 2021-04-15 RX ORDER — INSULIN GLARGINE 300 U/ML
24 INJECTION, SOLUTION SUBCUTANEOUS DAILY
Qty: 9 ML | Refills: 0 | Status: SHIPPED | OUTPATIENT
Start: 2021-04-15 | End: 2021-05-26 | Stop reason: SDUPTHER

## 2021-04-23 DIAGNOSIS — Z79.4 TYPE 2 DIABETES MELLITUS WITH OTHER SPECIFIED COMPLICATION, WITH LONG-TERM CURRENT USE OF INSULIN (HCC): ICD-10-CM

## 2021-04-23 DIAGNOSIS — F41.9 ANXIETY: ICD-10-CM

## 2021-04-23 DIAGNOSIS — M15.9 PRIMARY OSTEOARTHRITIS INVOLVING MULTIPLE JOINTS: ICD-10-CM

## 2021-04-23 DIAGNOSIS — E11.69 TYPE 2 DIABETES MELLITUS WITH OTHER SPECIFIED COMPLICATION, WITH LONG-TERM CURRENT USE OF INSULIN (HCC): ICD-10-CM

## 2021-04-23 NOTE — TELEPHONE ENCOUNTER
Next Visit Date:  No future appointments.     Health Maintenance   Topic Date Due    Hepatitis C screen  Never done    COVID-19 Vaccine (1) Never done    Shingles Vaccine (1 of 2) 04/04/2023 (Originally 11/22/1994)    Annual Wellness Visit (AWV)  08/06/2021    Lipid screen  08/31/2021    Potassium monitoring  01/04/2022    Creatinine monitoring  01/04/2022    DTaP/Tdap/Td vaccine (2 - Td) 11/11/2026    DEXA (modify frequency per FRAX score)  Completed    Flu vaccine  Completed    Pneumococcal 65+ years Vaccine  Completed    Hepatitis A vaccine  Aged Out    Hib vaccine  Aged Out    Meningococcal (ACWY) vaccine  Aged Out       Hemoglobin A1C (%)   Date Value   08/31/2020 8.2 (H)   02/25/2020 9.5 (H)   05/31/2019 10.4 (H)             ( goal A1C is < 7)   Microalbumin, Random Urine (mg/L)   Date Value   08/31/2020 72.6     LDL Cholesterol (mg/dL)   Date Value   08/31/2020 61   06/09/2020 81     LDL Calculated (mg/dL)   Date Value   04/21/2017 84   08/29/2016 63       (goal LDL is <100)   AST (U/L)   Date Value   01/04/2021 12     ALT (U/L)   Date Value   01/04/2021 19     BUN (mg/dL)   Date Value   01/04/2021 14     BP Readings from Last 3 Encounters:   08/05/20 127/75   02/10/20 133/80   12/11/19 113/71          (goal 120/80)    All Future Testing planned in [facility]  Lab Frequency Next Occurrence   Hemoglobin A1C Once 08/05/2020   Microalbumin, Ur Once 08/05/2020   XR CHEST STANDARD (2 VW) Once 02/03/2021               Patient Active Problem List:     Type 2 diabetes mellitus ([address]     Hyperlipidemia     Osteoarthritis     Anxiety     COPD (chronic obstructive pulmonary disease) ([facility] [address]     Uncontrolled diabetes mellitus (HCC)     Coronary atherosclerosis     Ischemic cardiomyopathy     Mitral valve disease     Nonrheumatic mitral (valve) insufficiency     Presence of aortocoronary bypass graft

## 2021-04-26 RX ORDER — LORAZEPAM 0.5 MG/1
0.5 TABLET ORAL EVERY 8 HOURS PRN
Qty: 30 TABLET | Refills: 0 | Status: SHIPPED | OUTPATIENT
Start: 2021-04-26 | End: 2021-08-20 | Stop reason: SDUPTHER

## 2021-04-26 RX ORDER — TRAMADOL HYDROCHLORIDE 50 MG/1
50 TABLET ORAL DAILY PRN
Qty: 30 TABLET | Refills: 0 | Status: SHIPPED | OUTPATIENT
Start: 2021-04-26 | End: 2021-08-20 | Stop reason: SDUPTHER

## 2021-05-24 ENCOUNTER — TELEPHONE (OUTPATIENT)
Dept: FAMILY MEDICINE CLINIC | Age: 76
End: 2021-05-24

## 2021-05-24 RX ORDER — BLOOD-GLUCOSE METER
1 EACH MISCELLANEOUS 3 TIMES DAILY
Qty: 1 KIT | Refills: 0 | Status: SHIPPED | OUTPATIENT
Start: 2021-05-24

## 2021-05-25 ENCOUNTER — NURSE TRIAGE (OUTPATIENT)
Dept: OTHER | Facility: CLINIC | Age: 76
End: 2021-05-25

## 2021-05-25 NOTE — TELEPHONE ENCOUNTER
Reason for Disposition   MILD difficulty breathing (e.g., minimal/no SOB at rest, SOB with walking, pulse < 100) of new onset or worse than normal    Answer Assessment - Initial Assessment Questions  1. RESPIRATORY STATUS: \"Describe your breathing? \" (e.g., wheezing, shortness of breath, unable to speak, severe coughing)       Sob not getting any better. No sob at rest. Walking will get her sob. Had covid back in december 2. ONSET: \"When did this breathing problem begin? \"   Started 2 weeks ago    3. PATTERN \"Does the difficult breathing come and go, or has it been constant since it started? \"   Intermittent    4. SEVERITY: \"How bad is your breathing? \" (e.g., mild, moderate, severe)     - MILD: No SOB at rest, mild SOB with walking, speaks normally in sentences, can lay down, no retractions, pulse < 100.     - MODERATE: SOB at rest, SOB with minimal exertion and prefers to sit, cannot lie down flat, speaks in phrases, mild retractions, audible wheezing, pulse 100-120.     - SEVERE: Very SOB at rest, speaks in single words, struggling to breathe, sitting hunched forward, retractions, pulse > 120     Audibly breathing hard. states is walking around    5. RECURRENT SYMPTOM: \"Have you had difficulty breathing before? \" If so, ask: \"When was the last time? \" and \"What happened that time?\"       6. CARDIAC HISTORY: \"Do you have any history of heart disease? \" (e.g., heart attack, angina, bypass surgery, angioplasty)      Open heart surgery    7. LUNG HISTORY: \"Do you have any history of lung disease? \"  (e.g., pulmonary embolus, asthma, emphysema)  COPD    8. CAUSE: \"What do you think is causing the breathing problem?\"     9. OTHER SYMPTOMS: \"Do you have any other symptoms? (e.g., dizziness, runny nose, cough, chest pain, fever)    No other symptoms    10. PREGNANCY: \"Is there any chance you are pregnant? \" \"When was your last menstrual period? \"    11. TRAVEL: \"Have you traveled out of the country in the last month? \" (e.g., travel history, exposures)    Protocols used: BREATHING DIFFICULTY-ADULT-OH    Received call from [facility] at pre-service center [facility]) [facility] with [employer] [facility]. Brief description of triage: Pt with intermittent sob for 2 weeks. Is sob when up walking around. No sob at rest. No chest pain, no other symptoms. Had covid back in December. Triage indicates for patient to go to office now. Care advice provided, patient verbalizes understanding; denies any other questions or concerns; instructed to call back for any new or worsening symptoms. Writer provided warm transfer to Shant at [facility] for appointment scheduling. Attention Provider: Thank you for allowing me to participate in the care of your patient. The patient was connected to triage in response to information provided to the [facility]. Please do not respond through this encounter as the response is not directed to a shared pool.

## 2021-05-26 ENCOUNTER — OFFICE VISIT (OUTPATIENT)
Dept: FAMILY MEDICINE CLINIC | Age: 76
End: 2021-05-26
Payer: MEDICARE

## 2021-05-26 ENCOUNTER — HOSPITAL ENCOUNTER (OUTPATIENT)
Dept: GENERAL RADIOLOGY | Facility: CLINIC | Age: 76
Discharge: HOME | End: 2021-05-28
Payer: MEDICARE

## 2021-05-26 ENCOUNTER — HOSPITAL ENCOUNTER (OUTPATIENT)
Facility: CLINIC | Age: 76
Discharge: HOME | End: 2021-05-28
Payer: MEDICARE

## 2021-05-26 VITALS
SYSTOLIC BLOOD PRESSURE: 128 MMHG | OXYGEN SATURATION: 95 % | HEART RATE: 84 BPM | TEMPERATURE: 97.6 F | DIASTOLIC BLOOD PRESSURE: 74 MMHG | WEIGHT: 156 LBS | BODY MASS INDEX: 28.71 KG/M2 | HEIGHT: 62 IN

## 2021-05-26 DIAGNOSIS — U07.1 PNEUMONIA DUE TO COVID-19 VIRUS: ICD-10-CM

## 2021-05-26 DIAGNOSIS — R06.09 DYSPNEA ON EXERTION: Primary | ICD-10-CM

## 2021-05-26 DIAGNOSIS — J12.82 PNEUMONIA DUE TO COVID-19 VIRUS: ICD-10-CM

## 2021-05-26 DIAGNOSIS — E11.69 TYPE 2 DIABETES MELLITUS WITH OTHER SPECIFIED COMPLICATION, WITH LONG-TERM CURRENT USE OF INSULIN (HCC): ICD-10-CM

## 2021-05-26 DIAGNOSIS — R06.09 DYSPNEA ON EXERTION: ICD-10-CM

## 2021-05-26 DIAGNOSIS — Z79.4 TYPE 2 DIABETES MELLITUS WITH OTHER SPECIFIED COMPLICATION, WITH LONG-TERM CURRENT USE OF INSULIN (HCC): ICD-10-CM

## 2021-05-26 LAB — TROPONIN: 10

## 2021-05-26 PROCEDURE — 99214 OFFICE O/P EST MOD 30 MIN: CPT | Performed by: FAMILY MEDICINE

## 2021-05-26 PROCEDURE — 93000 ELECTROCARDIOGRAM COMPLETE: CPT | Performed by: FAMILY MEDICINE

## 2021-05-26 PROCEDURE — 71046 X-RAY EXAM CHEST 2 VIEWS: CPT

## 2021-05-26 RX ORDER — BLOOD-GLUCOSE METER
1 EACH MISCELLANEOUS DAILY
Qty: 1 KIT | Refills: 0 | Status: SHIPPED | OUTPATIENT
Start: 2021-05-26

## 2021-05-26 RX ORDER — IPRATROPIUM BROMIDE AND ALBUTEROL SULFATE 2.5; .5 MG/3ML; MG/3ML
1 SOLUTION RESPIRATORY (INHALATION) EVERY 4 HOURS
Qty: 360 ML | Refills: 0 | Status: SHIPPED | OUTPATIENT
Start: 2021-05-26 | End: 2022-02-02 | Stop reason: SDUPTHER

## 2021-05-26 RX ORDER — INSULIN GLARGINE 300 U/ML
30 INJECTION, SOLUTION SUBCUTANEOUS DAILY
Qty: 9 ML | Refills: 0
Start: 2021-05-26 | End: 2021-05-31 | Stop reason: SDUPTHER

## 2021-05-26 SDOH — ECONOMIC STABILITY: FOOD INSECURITY: WITHIN THE PAST 12 MONTHS, YOU WORRIED THAT YOUR FOOD WOULD RUN OUT BEFORE YOU GOT MONEY TO BUY MORE.: NEVER TRUE

## 2021-05-26 ASSESSMENT — ENCOUNTER SYMPTOMS: SHORTNESS OF BREATH: 1

## 2021-05-26 NOTE — PROGRESS NOTES
[address] PRIMARY CARE  [address] [place] [address]  Dept: [phone]  Dept Fax: [phone]    Kath Mendosa is a 67 y.o. female who is a Established patient, who presents today for her medical conditions/complaints as noted below:  Chief Complaint   Patient presents with    Shortness of Breath    Diabetes         HPI:     The patient is a 67year old female with a past medical history significant for CAD, cardiomyopathy, COPD who presents with progressively worsening shortness of breath, chest discomfort. When she walks and then she rests it can take 10 minutes for her to catch her breath. The patient went grocery shopping and couldn't breathe just pushing the cart. She has not used anything to help with this. She was on albuterol nebulizer solution during her Covid illness but has not restarted this at this time. The patient did have COVID that required hospitalization around 5 to 6 months ago. Since that time she has noticed worsening shortness of breath. The patient denies any recent travel, lower extremity edema, dizziness, left shoulder or neck pain.     Diabetes        Hemoglobin A1C (%)   Date Value   08/31/2020 8.2 (H)   02/25/2020 9.5 (H)   05/31/2019 10.4 (H)             ( goal A1Cis < 7)   Microalbumin, Random Urine (mg/L)   Date Value   08/31/2020 72.6     LDL Cholesterol (mg/dL)   Date Value   08/31/2020 61   06/09/2020 81   02/25/2020 69     LDL Calculated (mg/dL)   Date Value   04/21/2017 84   08/29/2016 63       (goal LDL is <100)   AST (U/L)   Date Value   01/04/2021 12     ALT (U/L)   Date Value   01/04/2021 19     BUN (mg/dL)   Date Value   05/27/2021 11     BP Readings from Last 3 Encounters:   05/26/21 128/74   08/05/20 127/75   02/10/20 133/80          (goal 120/80)    Past Medical History:   Diagnosis Date    Anxiety     Coronary atherosclerosis 8/5/2020    Hyperlipidemia     Osteoarthritis     Type II or unspecified type diabetes mellitus without mention of complication, not stated as uncontrolled       Past Surgical History:   Procedure Laterality Date    CARDIAC SURGERY      KNEE SURGERY      TONSILLECTOMY         Family History   Problem Relation Age of Onset    Cancer Mother         hodgkins    Diabetes Father        Social History     Tobacco Use    Smoking status: Former Smoker     Packs/day: 0.50     Years: 30.00     Pack years: 15.00     Types: Cigarettes     Start date: 1979     Quit date: 2007     Years since quittin.0    Smokeless tobacco: Never Used   Substance Use Topics    Alcohol use: No      Current Outpatient Medications   Medication Sig Dispense Refill    Blood Glucose Monitoring Suppl (ONE TOUCH ULTRA 2) w/Device KIT 1 kit by Does not apply route daily 1 kit 0    ipratropium-albuterol (DUONEB) 0.5-2.5 (3) MG/3ML SOLN nebulizer solution Inhale 3 mLs into the lungs every 4 hours 360 mL 0    Insulin Glargine, 2 Unit Dial, (TOUJEO MAX SOLOSTAR) 300 UNIT/ML SOPN Inject 30 Units into the skin daily 9 mL 0    Blood Glucose Monitoring Suppl (EASY STEP GLUCOSE MONITOR) w/Device KIT 1 each by Does not apply route 3 times daily 1 kit 0    metFORMIN (GLUCOPHAGE) 1000 MG tablet Take 1 tablet twice daily 180 tablet 1    Insulin Pen Needle (RELION PEN NEEDLES) 32G X 4 MM MISC USE  ONCE DAILY 042 each 0    folic acid (FOLVITE) 1 MG tablet Take 1 tablet by mouth daily 90 tablet 1    albuterol sulfate HFA (VENTOLIN HFA) 108 (90 Base) MCG/ACT inhaler Inhale 2 puffs into the lungs 4 times daily as needed for Wheezing 3 Inhaler 1    ezetimibe (ZETIA) 10 MG tablet Take 10 mg by mouth daily      pravastatin (PRAVACHOL) 80 MG tablet Take 1 tablet by mouth daily 90 tablet 1    carvedilol (COREG) 12.5 MG tablet Take 1 tablet by mouth 2 times daily (with meals) 180 tablet 1    digoxin (LANOXIN) 125 MCG tablet Take 1 tablet by mouth daily 90 tablet 1    lisinopril (PRINIVIL;ZESTRIL) 5 MG tablet Take 1 tablet by mouth 2 times daily 180 tablet 1    blood glucose test strips (ONE TOUCH ULTRA TEST) strip 1 each by Does not apply route daily Dx E 11.9 patient test  strip 11    Lancets 30G MISC 1 each by Does not apply route daily 100 each 3    ONE TOUCH LANCETS MISC 1 each by Does not apply route 3 times daily Dx E 11.9 100 each 11    aspirin 81 MG tablet Take 162 mg by mouth      Niacin (VITAMIN B-3 PO) Take 500 mg by mouth      Lutein 10 MG TABS Take 10 mg by mouth      Blood Glucose Monitoring Suppl KIT Please dispense machine with lancets and testing strips, her diagnoses code is 250.00 1 kit 0     No current facility-administered medications for this visit. Allergies   Allergen Reactions    Docosahexaenoic Acid (Dha) Diarrhea    Mineral Oil Diarrhea    Niacin Itching     Slow Niacin    Nicotine     Other Diarrhea    Sulfa Antibiotics      Other reaction(s): Intolerance-unknown  Other reaction(s): Intolerance-unknown  Other reaction(s): Unknown    Vitamin E Diarrhea       Health Maintenance   Topic Date Due    Hepatitis C screen  Never done    COVID-19 Vaccine (1) Never done    Shingles Vaccine (1 of 2) 04/04/2023 (Originally 11/22/1994)    Annual Wellness Visit (AWV)  08/06/2021    Lipid screen  08/31/2021    Potassium monitoring  05/27/2022    Creatinine monitoring  05/27/2022    DTaP/Tdap/Td vaccine (2 - Td) 11/11/2026    DEXA (modify frequency per FRAX score)  Completed    Flu vaccine  Completed    Pneumococcal 65+ years Vaccine  Completed    Hepatitis A vaccine  Aged Out    Hib vaccine  Aged Out    Meningococcal (ACWY) vaccine  Aged Out       Subjective:     Review of Systems   Constitutional: Negative. HENT: Negative. Eyes: Negative. Respiratory: Positive for chest tightness and shortness of breath. Negative for apnea, cough, choking and stridor. Cardiovascular: Negative. Gastrointestinal: Negative. Genitourinary: Negative. Musculoskeletal: Negative.     Skin: Negative. Allergic/Immunologic: Negative for environmental allergies, food allergies and immunocompromised state. Neurological: Negative. Psychiatric/Behavioral: Negative. Objective:     Physical Exam  Vitals and nursing note reviewed. Constitutional:       General: She is awake. She is not in acute distress. Appearance: Normal appearance. She is not ill-appearing, toxic-appearing or diaphoretic. HENT:      Head: Normocephalic and atraumatic. Right Ear: External ear normal.      Left Ear: Ear canal and external ear normal.      Nose: Nose normal.      Mouth/Throat:      Mouth: Mucous membranes are moist.   Eyes:      Extraocular Movements: Extraocular movements intact. Conjunctiva/sclera: Conjunctivae normal.   Cardiovascular:      Rate and Rhythm: Normal rate and regular rhythm. Pulses: Normal pulses. Heart sounds: Normal heart sounds. No murmur heard. No friction rub. No gallop. Pulmonary:      Effort: Pulmonary effort is normal. No respiratory distress. Breath sounds: Normal breath sounds. No stridor. No wheezing, rhonchi or rales. Musculoskeletal:         General: Normal range of motion. Cervical back: Normal range of motion. Skin:     General: Skin is warm and dry. Capillary Refill: Capillary refill takes less than 2 seconds. Neurological:      General: No focal deficit present. Mental Status: She is alert and oriented to person, place, and time. Mental status is at baseline. Psychiatric:         Attention and Perception: Attention normal.         Mood and Affect: Mood and affect normal.         Speech: Speech normal.         Behavior: Behavior normal.         Thought Content: Thought content normal.         Judgment: Judgment normal.       /74   Pulse 84   Temp 97.6 °F (36.4 °C)   Ht 5' 2\" (1.575 m)   Wt 156 lb (70.8 kg)   SpO2 95%   BMI 28.53 kg/m²     Assessment:       Diagnosis Orders   1.  Dyspnea on exertion  XR CHEST STANDARD (2 VW)    Brain Natriuretic Peptide    CBC Auto Differential    Troponin    Basic Metabolic Panel    D-Dimer, Quantitative    EKG 12 Lead    Echocardiogram complete   2. Type 2 diabetes mellitus with other specified complication, with long-term current use of insulin ([facility])  Blood Glucose Monitoring Suppl (ONE TOUCH ULTRA 2) w/Device KIT    Insulin Glargine, 2 Unit Dial, (TOUJEO MAX SOLOSTAR) 300 UNIT/ML SOPN   3. Pneumonia due to COVID-19 virus  ipratropium-albuterol (DUONEB) 0.5-2.5 (3) MG/3ML SOLN nebulizer solution             Plan:    Dyspnea on exertion-labs ordered include troponin, BNP, D-dimer, BMP. Chest x-ray was ordered along with echocardiogram.  Patient advised to go to the emergency room if shortness of breath worsens. I would like her to restart her albuterol nebulizer solution that she has at home. She does have enough of the medication and also notes that she has all the machine pieces and parts she needs to have at work. EKG in the office is stable. We will monitor closely. Diabetes-A1c is stable but I would like the patient to increase her Toujeo 30 units daily due to the patient's A1c still being above 8. Samples provided today for the patient. Return in about 3 months (around 8/26/2021) for diabetes.     Orders Placed This Encounter   Procedures    XR CHEST STANDARD (2 VW)     Standing Status:   Future     Number of Occurrences:   1     Standing Expiration Date:   5/26/2022     Order Specific Question:   Reason for exam:     Answer:   sob     Order Specific Question:   Reason for exam:     Answer:   call with results [phone]    Brain Natriuretic Peptide     Standing Status:   Future     Number of Occurrences:   1     Standing Expiration Date:   5/26/2022    CBC Auto Differential     Standing Status:   Future     Number of Occurrences:   1     Standing Expiration Date:   5/26/2022    Troponin     Standing Status:   Future     Number of Occurrences:   1     Standing Expiration Date:   2022    Basic Metabolic Panel     Standing Status:   Future     Number of Occurrences:   1     Standing Expiration Date:   2022    D-Dimer, Quantitative     Standing Status:   Future     Number of Occurrences:   1     Standing Expiration Date:   2021    EKG 12 Lead     Order Specific Question:   Reason for Exam?     Answer:   Shortness of Breath    Echocardiogram complete     Standing Status:   Future     Standing Expiration Date:   2021     Order Specific Question:   Reason for exam:     Answer:   sob     Orders Placed This Encounter   Medications    Blood Glucose Monitoring Suppl (ONE TOUCH ULTRA 2) w/Device KIT     Si kit by Does not apply route daily     Dispense:  1 kit     Refill:  0    ipratropium-albuterol (DUONEB) 0.5-2.5 (3) MG/3ML SOLN nebulizer solution     Sig: Inhale 3 mLs into the lungs every 4 hours     Dispense:  360 mL     Refill:  0    Insulin Glargine, 2 Unit Dial, (TOUJEO MAX SOLOSTAR) 300 UNIT/ML SOPN     Sig: Inject 30 Units into the skin daily     Dispense:  9 mL     Refill:  0       Patient given educational materials - see patient instructions. Discussed use, benefit, and side effects of prescribed medications. All patientquestions answered. Pt voiced understanding. Reviewed health maintenance. Instructedto continue current medications, diet and exercise. Patient agreed with treatmentplan. Follow up as directed.      Electronically signed by Rafy Navarro MD on 2021 at 1:02 PM

## 2021-05-27 DIAGNOSIS — R06.09 DYSPNEA ON EXERTION: ICD-10-CM

## 2021-05-27 LAB
B-TYPE NATRIURETIC PEPTIDE: 108 PG/ML
BASOPHILS ABSOLUTE: 0.1 /ΜL
BASOPHILS RELATIVE PERCENT: 1 %
BUN BLDV-MCNC: 11 MG/DL
CALCIUM SERPL-MCNC: 9.5 MG/DL
CHLORIDE BLD-SCNC: 101 MMOL/L
CO2: 24 MMOL/L
CREAT SERPL-MCNC: 0.85 MG/DL
EOSINOPHILS ABSOLUTE: 0.3 /ΜL
EOSINOPHILS RELATIVE PERCENT: 4 %
GFR CALCULATED: 65
GLUCOSE BLD-MCNC: 219 MG/DL
HCT VFR BLD CALC: 38.2 % (ref 36–46)
HEMOGLOBIN: 12.8 G/DL (ref 12–16)
LYMPHOCYTES ABSOLUTE: 2.3 /ΜL
LYMPHOCYTES RELATIVE PERCENT: 28 %
MCH RBC QN AUTO: 30.4 PG
MCHC RBC AUTO-ENTMCNC: 33.4 G/DL
MCV RBC AUTO: 91 FL
MONOCYTES ABSOLUTE: 0.5 /ΜL
MONOCYTES RELATIVE PERCENT: 6 %
NEUTROPHILS ABSOLUTE: 4.9 /ΜL
NEUTROPHILS RELATIVE PERCENT: 60 %
PDW BLD-RTO: 13.5 %
PLATELET # BLD: 320 K/ΜL
PMV BLD AUTO: 6.9 FL
POTASSIUM SERPL-SCNC: 4.4 MMOL/L
RBC # BLD: 4.2 10^6/ΜL
SODIUM BLD-SCNC: 138 MMOL/L
WBC # BLD: 8.1 10^3/ML

## 2021-05-31 RX ORDER — INSULIN GLARGINE 300 U/ML
30 INJECTION, SOLUTION SUBCUTANEOUS DAILY
Qty: 9 ML | Refills: 0 | COMMUNITY
Start: 2021-05-31 | End: 2021-08-07

## 2021-05-31 ASSESSMENT — ENCOUNTER SYMPTOMS
STRIDOR: 0
APNEA: 0
CHOKING: 0
COUGH: 0
EYES NEGATIVE: 1
GASTROINTESTINAL NEGATIVE: 1
CHEST TIGHTNESS: 1

## 2021-06-17 DIAGNOSIS — R06.09 DYSPNEA ON EXERTION: ICD-10-CM

## 2021-06-17 LAB
LEFT VENTRICULAR EJECTION FRACTION HIGH VALUE: 0 %
LEFT VENTRICULAR EJECTION FRACTION MODE: NORMAL
LV EF: 0 %

## 2021-06-28 RX ORDER — GLUCOSAMINE HCL/CHONDROITIN SU 500-400 MG
CAPSULE ORAL
Qty: 100 STRIP | Refills: 0 | Status: SHIPPED | OUTPATIENT
Start: 2021-06-28 | End: 2021-06-30 | Stop reason: ALTCHOICE

## 2021-06-28 NOTE — TELEPHONE ENCOUNTER
Next Visit Date:  Future Appointments   Date Time Provider Department Center   8/26/2021 10:30 AM Michael Monroe MD [facility] [address] Maintenance   Topic Date Due    Hepatitis C screen  Never done    COVID-19 Vaccine (1) Never done    Shingles Vaccine (1 of 2) 04/04/2023 (Originally 11/22/1994)    Annual Wellness Visit (AWV)  08/06/2021    Lipid screen  08/31/2021    Potassium monitoring  05/27/2022    Creatinine monitoring  05/27/2022    DTaP/Tdap/Td vaccine (2 - Td or Tdap) 11/11/2026    DEXA (modify frequency per FRAX score)  Completed    Flu vaccine  Completed    Pneumococcal 65+ years Vaccine  Completed    Hepatitis A vaccine  Aged Out    Hib vaccine  Aged Out    Meningococcal (ACWY) vaccine  Aged Out       Hemoglobin A1C (%)   Date Value   08/31/2020 8.2 (H)   02/25/2020 9.5 (H)   05/31/2019 10.4 (H)             ( goal A1C is < 7)   Microalbumin, Random Urine (mg/L)   Date Value   08/31/2020 72.6     LDL Cholesterol (mg/dL)   Date Value   08/31/2020 61   06/09/2020 81     LDL Calculated (mg/dL)   Date Value   04/21/2017 84   08/29/2016 63       (goal LDL is <100)   AST (U/L)   Date Value   01/04/2021 12     ALT (U/L)   Date Value   01/04/2021 19     BUN (mg/dL)   Date Value   05/27/2021 11     BP Readings from Last 3 Encounters:   05/26/21 128/74   08/05/20 127/75   02/10/20 133/80          (goal 120/80)    All Future Testing planned in [facility]  Lab Frequency Next Occurrence               Patient Active Problem List:     Type 2 diabetes mellitus (HCC)     Hyperlipidemia     Osteoarthritis     Anxiety     COPD (chronic obstructive pulmonary disease) (HCC)     Uncontrolled diabetes mellitus (HCC)     Coronary atherosclerosis     Ischemic cardiomyopathy     Mitral valve disease     Nonrheumatic mitral (valve) insufficiency     Presence of aortocoronary bypass graft

## 2021-06-29 NOTE — TELEPHONE ENCOUNTER
----- Message from Shaylee Tate sent at 6/29/2021  8:33 AM EDT -----  Subject: Refill Request    QUESTIONS  Name of Medication? blood glucose test strips (ONE TOUCH ULTRA TEST) strip  Patient-reported dosage and instructions? Use as needed  How many days do you have left? 0  Preferred Pharmacy? [facility]  Pharmacy phone number (if available)? [phone]  ---------------------------------------------------------------------------  --------------  CALL BACK INFO  What is the best way for the office to contact you? OK to leave message on   voicemail  Preferred Call Back Phone Number?  [phone]

## 2021-08-06 DIAGNOSIS — E11.69 TYPE 2 DIABETES MELLITUS WITH OTHER SPECIFIED COMPLICATION, WITH LONG-TERM CURRENT USE OF INSULIN (HCC): ICD-10-CM

## 2021-08-06 DIAGNOSIS — Z79.4 TYPE 2 DIABETES MELLITUS WITH OTHER SPECIFIED COMPLICATION, WITH LONG-TERM CURRENT USE OF INSULIN (HCC): ICD-10-CM

## 2021-08-06 NOTE — TELEPHONE ENCOUNTER
Next Visit Date:  Future Appointments   Date Time Provider Department Center   8/26/2021 10:30 AM [facility] [address] Maintenance   Topic Date Due    Hepatitis C screen  Never done    COVID-19 Vaccine (1) Never done    Annual Wellness Visit (AWV)  08/06/2021    Shingles Vaccine (1 of 2) 04/04/2023 (Originally 11/22/1994)    Lipid screen  08/31/2021    Flu vaccine (1) 09/01/2021    Potassium monitoring  05/27/2022    Creatinine monitoring  05/27/2022    DTaP/Tdap/Td vaccine (2 - Td or Tdap) 11/11/2026    DEXA (modify frequency per FRAX score)  Completed    Pneumococcal 65+ years Vaccine  Completed    Hepatitis A vaccine  Aged Out    Hib vaccine  Aged Out    Meningococcal (ACWY) vaccine  Aged Out       Hemoglobin A1C (%)   Date Value   08/31/2020 8.2 (H)   02/25/2020 9.5 (H)   05/31/2019 10.4 (H)             ( goal A1C is < 7)   Microalbumin, Random Urine (mg/L)   Date Value   08/31/2020 72.6     LDL Cholesterol (mg/dL)   Date Value   08/31/2020 61   06/09/2020 81     LDL Calculated (mg/dL)   Date Value   04/21/2017 84   08/29/2016 63       (goal LDL is <100)   AST (U/L)   Date Value   01/04/2021 12     ALT (U/L)   Date Value   01/04/2021 19     BUN (mg/dL)   Date Value   05/27/2021 11     BP Readings from Last 3 Encounters:   05/26/21 128/74   08/05/20 127/75   02/10/20 133/80          (goal 120/80)    All Future Testing planned in [facility]  Lab Frequency Next Occurrence               Patient Active Problem List:     Type 2 diabetes mellitus (HCC)     Hyperlipidemia     Osteoarthritis     Anxiety     COPD (chronic obstructive pulmonary disease) (HCC)     Uncontrolled diabetes mellitus (HCC)     Coronary atherosclerosis     Ischemic cardiomyopathy     Mitral valve disease     Nonrheumatic mitral (valve) insufficiency     Presence of aortocoronary bypass graft

## 2021-08-07 RX ORDER — INSULIN GLARGINE 300 U/ML
INJECTION, SOLUTION SUBCUTANEOUS
Qty: 9 ML | Refills: 0 | Status: SHIPPED | OUTPATIENT
Start: 2021-08-07 | End: 2021-08-26 | Stop reason: SDUPTHER

## 2021-08-20 DIAGNOSIS — M15.9 PRIMARY OSTEOARTHRITIS INVOLVING MULTIPLE JOINTS: ICD-10-CM

## 2021-08-20 DIAGNOSIS — F41.9 ANXIETY: ICD-10-CM

## 2021-08-20 RX ORDER — TRAMADOL HYDROCHLORIDE 50 MG/1
50 TABLET ORAL DAILY PRN
Qty: 30 TABLET | Refills: 0 | Status: SHIPPED | OUTPATIENT
Start: 2021-08-20 | End: 2021-09-19

## 2021-08-20 RX ORDER — LORAZEPAM 0.5 MG/1
0.5 TABLET ORAL EVERY 8 HOURS PRN
Qty: 30 TABLET | Refills: 0 | Status: SHIPPED | OUTPATIENT
Start: 2021-08-20 | End: 2021-09-20

## 2021-08-20 RX ORDER — PEN NEEDLE, DIABETIC 32GX 5/32"
NEEDLE, DISPOSABLE MISCELLANEOUS
Qty: 100 EACH | Refills: 0 | Status: SHIPPED | OUTPATIENT
Start: 2021-08-20 | End: 2021-10-21

## 2021-08-20 NOTE — TELEPHONE ENCOUNTER
Patient called to request refills of pended medications, please advise. Thank you!           Next Visit Date:  Future Appointments   Date Time Provider Department Center   8/26/2021 10:30 AM So Lion MD [facility] AND [facility]OMEN[facility] \[facility]"" [address] Maintenance   Topic Date Due    Hepatitis C screen  Never done    COVID-19 Vaccine (1) Never done    Annual Wellness Visit (AWV)  Never done    Lipid screen  08/31/2021    Shingles Vaccine (1 of 2) 04/04/2023 (Originally 11/22/1994)    Flu vaccine (1) 09/01/2021    Potassium monitoring  05/27/2022    Creatinine monitoring  05/27/2022    DTaP/Tdap/Td vaccine (2 - Td or Tdap) 11/11/2026    DEXA (modify frequency per FRAX score)  Completed    Pneumococcal 65+ years Vaccine  Completed    Hepatitis A vaccine  Aged Out    Hib vaccine  Aged Out    Meningococcal (ACWY) vaccine  Aged Out       Hemoglobin A1C (%)   Date Value   08/31/2020 8.2 (H)   02/25/2020 9.5 (H)   05/31/2019 10.4 (H)             ( goal A1C is < 7)   Microalbumin, Random Urine (mg/L)   Date Value   08/31/2020 72.6     LDL Cholesterol (mg/dL)   Date Value   08/31/2020 61   06/09/2020 81     LDL Calculated (mg/dL)   Date Value   04/21/2017 84   08/29/2016 63       (goal LDL is <100)   AST (U/L)   Date Value   01/04/2021 12     ALT (U/L)   Date Value   01/04/2021 19     BUN (mg/dL)   Date Value   05/27/2021 11     BP Readings from Last 3 Encounters:   05/26/21 128/74   08/05/20 127/75   02/10/20 133/80          (goal 120/80)    All Future Testing planned in [facility]  Lab Frequency Next Occurrence               Patient Active Problem List:     Type 2 diabetes mellitus (HCC)     Hyperlipidemia     Osteoarthritis     Anxiety     COPD (chronic obstructive pulmonary disease) (HCC)     Uncontrolled diabetes mellitus (HCC)     Coronary atherosclerosis     Ischemic cardiomyopathy     Mitral valve disease     Nonrheumatic mitral (valve) insufficiency     Presence of aortocoronary bypass graft

## 2021-10-21 RX ORDER — PEN NEEDLE, DIABETIC 32GX 5/32"
NEEDLE, DISPOSABLE MISCELLANEOUS
Qty: 100 EACH | Refills: 0 | Status: SHIPPED | OUTPATIENT
Start: 2021-10-21 | End: 2022-03-30 | Stop reason: SDUPTHER

## 2021-10-21 RX ORDER — LANCETS 30 GAUGE
EACH MISCELLANEOUS
Qty: 100 EACH | Refills: 0 | Status: SHIPPED | OUTPATIENT
Start: 2021-10-21

## 2021-10-21 NOTE — TELEPHONE ENCOUNTER
Electronic medication refill request. Pharmacy on file. Please advise.         Next Visit Date:  Future Appointments   Date Time Provider Heather Lipscombi   2/24/2022 10:00 AM Shellie Bello MD [facility] [address] Maintenance   Topic Date Due    Hepatitis C screen  Never done    COVID-19 Vaccine (1) Never done    Annual Wellness Visit (AWV)  08/06/2021    Lipid screen  08/31/2021    Flu vaccine (1) 09/01/2021    Shingles Vaccine (1 of 2) 04/04/2023 (Originally 11/22/1994)    Potassium monitoring  05/27/2022    Creatinine monitoring  05/27/2022    DTaP/Tdap/Td vaccine (2 - Td or Tdap) 11/11/2026    DEXA (modify frequency per FRAX score)  Completed    Pneumococcal 65+ years Vaccine  Completed    Hepatitis A vaccine  Aged Out    Hib vaccine  Aged Out    Meningococcal (ACWY) vaccine  Aged Out       Hemoglobin A1C (%)   Date Value   08/26/2021 8.1   08/31/2020 8.2 (H)   02/25/2020 9.5 (H)             ( goal A1C is < 7)   Microalbumin, Random Urine (mg/L)   Date Value   08/31/2020 72.6     LDL Cholesterol (mg/dL)   Date Value   08/31/2020 61   06/09/2020 81     LDL Calculated (mg/dL)   Date Value   04/21/2017 84   08/29/2016 63       (goal LDL is <100)   AST (U/L)   Date Value   01/04/2021 12     ALT (U/L)   Date Value   01/04/2021 19     BUN (mg/dL)   Date Value   05/27/2021 11     BP Readings from Last 3 Encounters:   05/26/21 128/74   08/05/20 127/75   02/10/20 133/80          (goal 120/80)    All Future Testing planned in [facility]  Lab Frequency Next Occurrence   Lipid, Fasting Once 08/26/2021   Hepatitis C Antibody Once 08/26/2021   Comprehensive Metabolic Panel, Fasting Once 08/26/2021               Patient Active Problem List:     Type 2 diabetes mellitus ([address]     Hyperlipidemia     Osteoarthritis     Anxiety     COPD (chronic obstructive pulmonary disease) ([address]     Uncontrolled diabetes mellitus (HCC)     Coronary atherosclerosis     Ischemic cardiomyopathy     Mitral valve disease Nonrheumatic mitral (valve) insufficiency     Presence of aortocoronary bypass graft

## 2022-01-24 DIAGNOSIS — E11.69 TYPE 2 DIABETES MELLITUS WITH OTHER SPECIFIED COMPLICATION, WITH LONG-TERM CURRENT USE OF INSULIN (HCC): ICD-10-CM

## 2022-01-24 DIAGNOSIS — Z79.4 TYPE 2 DIABETES MELLITUS WITH OTHER SPECIFIED COMPLICATION, WITH LONG-TERM CURRENT USE OF INSULIN (HCC): ICD-10-CM

## 2022-01-24 RX ORDER — INSULIN GLARGINE 300 U/ML
INJECTION, SOLUTION SUBCUTANEOUS
Qty: 9 ML | Refills: 0 | Status: SHIPPED | OUTPATIENT
Start: 2022-01-24 | End: 2022-03-02 | Stop reason: SDUPTHER

## 2022-02-02 DIAGNOSIS — I34.0 NONRHEUMATIC MITRAL VALVE REGURGITATION: ICD-10-CM

## 2022-02-02 DIAGNOSIS — U07.1 PNEUMONIA DUE TO COVID-19 VIRUS: ICD-10-CM

## 2022-02-02 DIAGNOSIS — E78.5 HYPERLIPIDEMIA, UNSPECIFIED HYPERLIPIDEMIA TYPE: ICD-10-CM

## 2022-02-02 DIAGNOSIS — I25.10 CORONARY ARTERY DISEASE INVOLVING NATIVE CORONARY ARTERY OF NATIVE HEART WITHOUT ANGINA PECTORIS: ICD-10-CM

## 2022-02-02 DIAGNOSIS — E11.69 TYPE 2 DIABETES MELLITUS WITH OTHER SPECIFIED COMPLICATION, WITH LONG-TERM CURRENT USE OF INSULIN (HCC): ICD-10-CM

## 2022-02-02 DIAGNOSIS — I25.5 ISCHEMIC CARDIOMYOPATHY: ICD-10-CM

## 2022-02-02 DIAGNOSIS — I51.9 LEFT VENTRICULAR DYSFUNCTION: ICD-10-CM

## 2022-02-02 DIAGNOSIS — J12.82 PNEUMONIA DUE TO COVID-19 VIRUS: ICD-10-CM

## 2022-02-02 DIAGNOSIS — Z79.4 TYPE 2 DIABETES MELLITUS WITH OTHER SPECIFIED COMPLICATION, WITH LONG-TERM CURRENT USE OF INSULIN (HCC): ICD-10-CM

## 2022-02-02 RX ORDER — DIGOXIN 125 MCG
125 TABLET ORAL DAILY
Qty: 90 TABLET | Refills: 1 | Status: SHIPPED | OUTPATIENT
Start: 2022-02-02

## 2022-02-02 RX ORDER — PRAVASTATIN SODIUM 80 MG/1
80 TABLET ORAL DAILY
Qty: 90 TABLET | Refills: 1 | Status: SHIPPED | OUTPATIENT
Start: 2022-02-02

## 2022-02-02 RX ORDER — IPRATROPIUM BROMIDE AND ALBUTEROL SULFATE 2.5; .5 MG/3ML; MG/3ML
1 SOLUTION RESPIRATORY (INHALATION) EVERY 4 HOURS
Qty: 360 ML | Refills: 0 | Status: SHIPPED | OUTPATIENT
Start: 2022-02-02 | End: 2022-06-15

## 2022-02-02 RX ORDER — FOLIC ACID 1 MG/1
1 TABLET ORAL DAILY
Qty: 90 TABLET | Refills: 1 | Status: SHIPPED | OUTPATIENT
Start: 2022-02-02 | End: 2022-06-17

## 2022-02-02 RX ORDER — CARVEDILOL 12.5 MG/1
12.5 TABLET ORAL 2 TIMES DAILY WITH MEALS
Qty: 180 TABLET | Refills: 1 | Status: SHIPPED | OUTPATIENT
Start: 2022-02-02

## 2022-02-02 RX ORDER — LISINOPRIL 5 MG/1
5 TABLET ORAL 2 TIMES DAILY
Qty: 180 TABLET | Refills: 1 | Status: SHIPPED | OUTPATIENT
Start: 2022-02-02 | End: 2022-03-10 | Stop reason: SDUPTHER

## 2022-02-02 RX ORDER — ALBUTEROL SULFATE 90 UG/1
2 AEROSOL, METERED RESPIRATORY (INHALATION) EVERY 4 HOURS PRN
Qty: 18 G | Refills: 1 | Status: SHIPPED | OUTPATIENT
Start: 2022-02-02 | End: 2022-06-15

## 2022-02-02 NOTE — TELEPHONE ENCOUNTER
Sheyla Tru is calling to request a refill on the following medication(s):    Medication Request:  Requested Prescriptions     Pending Prescriptions Disp Refills    albuterol sulfate HFA (VENTOLIN HFA) 108 (90 Base) MCG/ACT inhaler       Sig: Inhale 2 puffs into the lungs 4 times daily as needed for Wheezing    carvedilol (COREG) 12.5 MG tablet 180 tablet 1     Sig: Take 1 tablet by mouth 2 times daily (with meals)    digoxin (LANOXIN) 125 MCG tablet 90 tablet 1     Sig: Take 1 tablet by mouth daily    folic acid (FOLVITE) 1 MG tablet 90 tablet 1     Sig: Take 1 tablet by mouth daily    ipratropium-albuterol (DUONEB) 0.5-2.5 (3) MG/3ML SOLN nebulizer solution 360 mL 0     Sig: Inhale 3 mLs into the lungs every 4 hours    lisinopril (PRINIVIL;ZESTRIL) 5 MG tablet 180 tablet 1     Sig: Take 1 tablet by mouth 2 times daily    metFORMIN (GLUCOPHAGE) 1000 MG tablet 180 tablet 1     Sig: Take 1 tablet twice daily    pravastatin (PRAVACHOL) 80 MG tablet 90 tablet 1     Sig: Take 1 tablet by mouth daily       Last Visit Date (If Applicable):  3/77/4757    Next Visit Date:    Visit date not found

## 2022-03-02 DIAGNOSIS — Z79.4 TYPE 2 DIABETES MELLITUS WITH OTHER SPECIFIED COMPLICATION, WITH LONG-TERM CURRENT USE OF INSULIN (HCC): ICD-10-CM

## 2022-03-02 DIAGNOSIS — E11.69 TYPE 2 DIABETES MELLITUS WITH OTHER SPECIFIED COMPLICATION, WITH LONG-TERM CURRENT USE OF INSULIN (HCC): ICD-10-CM

## 2022-03-02 RX ORDER — INSULIN GLARGINE 300 U/ML
INJECTION, SOLUTION SUBCUTANEOUS
Qty: 9 ML | Refills: 0 | Status: SHIPPED | OUTPATIENT
Start: 2022-03-02 | End: 2022-03-10 | Stop reason: SDUPTHER

## 2022-03-02 NOTE — TELEPHONE ENCOUNTER
Requested Prescriptions     Pending Prescriptions Disp Refills    Insulin Glargine, 2 Unit Dial, (TOUJEO MAX SOLOSTAR) 300 UNIT/ML SOPN 9 mL 0     Sig: INJECT 30 UNITS SUBCUTANEOUSLY ONCE DAILY

## 2022-03-10 ENCOUNTER — OFFICE VISIT (OUTPATIENT)
Dept: FAMILY MEDICINE CLINIC | Age: 77
End: 2022-03-10
Payer: COMMERCIAL

## 2022-03-10 VITALS
HEIGHT: 62 IN | WEIGHT: 170 LBS | HEART RATE: 80 BPM | DIASTOLIC BLOOD PRESSURE: 78 MMHG | SYSTOLIC BLOOD PRESSURE: 130 MMHG | BODY MASS INDEX: 31.28 KG/M2 | TEMPERATURE: 97 F | OXYGEN SATURATION: 97 %

## 2022-03-10 DIAGNOSIS — E11.69 TYPE 2 DIABETES MELLITUS WITH OTHER SPECIFIED COMPLICATION, WITH LONG-TERM CURRENT USE OF INSULIN (HCC): Primary | ICD-10-CM

## 2022-03-10 DIAGNOSIS — F41.9 ANXIETY: ICD-10-CM

## 2022-03-10 DIAGNOSIS — Z79.4 TYPE 2 DIABETES MELLITUS WITH OTHER SPECIFIED COMPLICATION, WITH LONG-TERM CURRENT USE OF INSULIN (HCC): Primary | ICD-10-CM

## 2022-03-10 DIAGNOSIS — J44.9 CHRONIC OBSTRUCTIVE PULMONARY DISEASE, UNSPECIFIED COPD TYPE (HCC): ICD-10-CM

## 2022-03-10 DIAGNOSIS — E78.5 HYPERLIPIDEMIA, UNSPECIFIED HYPERLIPIDEMIA TYPE: ICD-10-CM

## 2022-03-10 DIAGNOSIS — I25.5 ISCHEMIC CARDIOMYOPATHY: ICD-10-CM

## 2022-03-10 DIAGNOSIS — M15.9 PRIMARY OSTEOARTHRITIS INVOLVING MULTIPLE JOINTS: ICD-10-CM

## 2022-03-10 DIAGNOSIS — I25.10 CORONARY ARTERY DISEASE INVOLVING NATIVE CORONARY ARTERY OF NATIVE HEART WITHOUT ANGINA PECTORIS: ICD-10-CM

## 2022-03-10 LAB — HBA1C MFR BLD: 9.1 %

## 2022-03-10 PROCEDURE — 99214 OFFICE O/P EST MOD 30 MIN: CPT | Performed by: STUDENT IN AN ORGANIZED HEALTH CARE EDUCATION/TRAINING PROGRAM

## 2022-03-10 PROCEDURE — 83036 HEMOGLOBIN GLYCOSYLATED A1C: CPT | Performed by: STUDENT IN AN ORGANIZED HEALTH CARE EDUCATION/TRAINING PROGRAM

## 2022-03-10 RX ORDER — LORAZEPAM 0.5 MG/1
0.5 TABLET ORAL EVERY 6 HOURS PRN
Refills: 0 | Status: CANCELLED | OUTPATIENT
Start: 2022-03-10

## 2022-03-10 RX ORDER — TRAMADOL HYDROCHLORIDE 50 MG/1
50 TABLET ORAL EVERY 8 HOURS PRN
Qty: 30 TABLET | Refills: 0 | Status: SHIPPED | OUTPATIENT
Start: 2022-03-10 | End: 2022-04-09

## 2022-03-10 RX ORDER — LORAZEPAM 0.5 MG/1
0.5 TABLET ORAL EVERY 6 HOURS PRN
COMMUNITY
End: 2022-03-10 | Stop reason: SDUPTHER

## 2022-03-10 RX ORDER — DULAGLUTIDE 0.75 MG/.5ML
0.75 INJECTION, SOLUTION SUBCUTANEOUS WEEKLY
Qty: 4 PEN | Refills: 1 | Status: SHIPPED | OUTPATIENT
Start: 2022-03-10 | End: 2022-06-17

## 2022-03-10 RX ORDER — INSULIN GLARGINE 300 U/ML
INJECTION, SOLUTION SUBCUTANEOUS
Qty: 9 ML | Refills: 1 | Status: SHIPPED | OUTPATIENT
Start: 2022-03-10 | End: 2022-03-10 | Stop reason: SDUPTHER

## 2022-03-10 RX ORDER — TRAMADOL HYDROCHLORIDE 50 MG/1
TABLET ORAL
COMMUNITY
Start: 2021-12-10 | End: 2022-03-10 | Stop reason: SDUPTHER

## 2022-03-10 RX ORDER — LORAZEPAM 0.5 MG/1
0.5 TABLET ORAL EVERY 6 HOURS PRN
Qty: 30 TABLET | Refills: 0 | Status: SHIPPED | OUTPATIENT
Start: 2022-03-10 | End: 2022-04-09

## 2022-03-10 RX ORDER — LISINOPRIL 5 MG/1
5 TABLET ORAL DAILY
Qty: 180 TABLET | Refills: 1 | Status: SHIPPED | OUTPATIENT
Start: 2022-03-10 | End: 2022-09-14

## 2022-03-10 RX ORDER — INSULIN GLARGINE 300 U/ML
INJECTION, SOLUTION SUBCUTANEOUS
Qty: 2 PEN | Refills: 0 | COMMUNITY
Start: 2022-03-10

## 2022-03-10 RX ORDER — TRAMADOL HYDROCHLORIDE 50 MG/1
TABLET ORAL
Status: CANCELLED | OUTPATIENT
Start: 2022-03-10

## 2022-03-10 RX ORDER — PRAVASTATIN SODIUM 80 MG/1
80 TABLET ORAL DAILY
Qty: 90 TABLET | Refills: 1 | Status: CANCELLED | OUTPATIENT
Start: 2022-03-10

## 2022-03-10 SDOH — ECONOMIC STABILITY: TRANSPORTATION INSECURITY
IN THE PAST 12 MONTHS, HAS THE LACK OF TRANSPORTATION KEPT YOU FROM MEDICAL APPOINTMENTS OR FROM GETTING MEDICATIONS?: NO

## 2022-03-10 SDOH — ECONOMIC STABILITY: TRANSPORTATION INSECURITY
IN THE PAST 12 MONTHS, HAS LACK OF TRANSPORTATION KEPT YOU FROM MEETINGS, WORK, OR FROM GETTING THINGS NEEDED FOR DAILY LIVING?: NO

## 2022-03-10 SDOH — ECONOMIC STABILITY: FOOD INSECURITY: WITHIN THE PAST 12 MONTHS, THE FOOD YOU BOUGHT JUST DIDN'T LAST AND YOU DIDN'T HAVE MONEY TO GET MORE.: NEVER TRUE

## 2022-03-10 SDOH — ECONOMIC STABILITY: FOOD INSECURITY: WITHIN THE PAST 12 MONTHS, YOU WORRIED THAT YOUR FOOD WOULD RUN OUT BEFORE YOU GOT MONEY TO BUY MORE.: NEVER TRUE

## 2022-03-10 ASSESSMENT — ENCOUNTER SYMPTOMS
CHEST TIGHTNESS: 0
WHEEZING: 0
VOMITING: 0
DIARRHEA: 0
EYE DISCHARGE: 0
ABDOMINAL PAIN: 0
SORE THROAT: 0
SHORTNESS OF BREATH: 0
NAUSEA: 0
CONSTIPATION: 0
COUGH: 0

## 2022-03-10 ASSESSMENT — SOCIAL DETERMINANTS OF HEALTH (SDOH): HOW HARD IS IT FOR YOU TO PAY FOR THE VERY BASICS LIKE FOOD, HOUSING, MEDICAL CARE, AND HEATING?: NOT HARD AT ALL

## 2022-03-10 ASSESSMENT — PATIENT HEALTH QUESTIONNAIRE - PHQ9
SUM OF ALL RESPONSES TO PHQ QUESTIONS 1-9: 0
SUM OF ALL RESPONSES TO PHQ QUESTIONS 1-9: 0
SUM OF ALL RESPONSES TO PHQ9 QUESTIONS 1 & 2: 0
SUM OF ALL RESPONSES TO PHQ QUESTIONS 1-9: 0
1. LITTLE INTEREST OR PLEASURE IN DOING THINGS: 0
SUM OF ALL RESPONSES TO PHQ QUESTIONS 1-9: 0
2. FEELING DOWN, DEPRESSED OR HOPELESS: 0

## 2022-03-10 NOTE — PROGRESS NOTES
[address] PRIMARY CARE  [address] [place] [address] [address] [place] [address]  Dept: [phone]  Dept Fax: [phone]    Tawny Gee is a 67 y.o. female who is a Established patient, who presents today for her medical conditions/complaints as noted below:  Chief Complaint   Patient presents with    Establish Care         HPI:     She is here today following up on diabetes and to meet new provider. She says that her blood sugars have been running slightly high lately because she had a prolonged cough for which she was taking over-the-counter cough medicine. She says that she had COVID pneumonia last year and took a long time to recover from it. She says that since she stopped taking the cough medicine her blood sugars have slowly improved. She only checks her blood sugar one time in the mornings. She says it was 215 today and usually stays around this range. She takes Toujeo 30 units twice daily. She says that she has never done sliding scale and does not want to check her blood sugars 3 times a day. She cannot get a CGM because she does not have a cell phone. She follows with cardiology for her history of CAD and cardiomyopathy. She has chronic anxiety and says that she takes Xanax as needed. She says that she does not use it very often and still has a  few pills left from her last prescription. She also takes tramadol for her chronic joint pain occasionally and is requesting a refill.          Hemoglobin A1C (%)   Date Value   03/10/2022 9.1   08/26/2021 8.1   08/31/2020 8.2 (H)             ( goal A1Cis < 7)   Microalbumin, Random Urine (mg/L)   Date Value   08/31/2020 72.6     LDL Cholesterol (mg/dL)   Date Value   08/31/2020 61   06/09/2020 81   02/25/2020 69     LDL Calculated (mg/dL)   Date Value   04/21/2017 84   08/29/2016 63       (goal LDL is <100)   AST (U/L)   Date Value   01/04/2021 12     ALT (U/L)   Date Value   01/04/2021 19     BUN (mg/dL)   Date Value   05/27/2021 11     BP Readings from Last 3 Encounters:   03/10/22 130/78   21 128/74   20 127/75          (goal 120/80)    Past Medical History:   Diagnosis Date    Anxiety     Coronary atherosclerosis 2020    Hyperlipidemia     Osteoarthritis     Type II or unspecified type diabetes mellitus without mention of complication, not stated as uncontrolled       Past Surgical History:   Procedure Laterality Date    CARDIAC SURGERY      KNEE SURGERY      TONSILLECTOMY         Family History   Problem Relation Age of Onset    Cancer Mother         hodgkins    Diabetes Father        Social History     Tobacco Use    Smoking status: Former Smoker     Packs/day: 0.50     Years: 30.00     Pack years: 15.00     Types: Cigarettes     Start date: 1979     Quit date: 2007     Years since quittin.7    Smokeless tobacco: Never Used   Substance Use Topics    Alcohol use: No      Current Outpatient Medications   Medication Sig Dispense Refill    Dulaglutide (TRULICITY) 5.30 RI/9.8OF SOPN Inject 0.75 mg into the skin once a week 4 pen 1    lisinopril (PRINIVIL;ZESTRIL) 5 MG tablet Take 1 tablet by mouth daily 180 tablet 1    traMADol (ULTRAM) 50 MG tablet Take 1 tablet by mouth every 8 hours as needed for Pain for up to 30 days. 30 tablet 0    LORazepam (ATIVAN) 0.5 MG tablet Take 1 tablet by mouth every 6 hours as needed for Anxiety for up to 30 days.  30 tablet 0    Insulin Glargine, 2 Unit Dial, (TOUJEO MAX SOLOSTAR) 300 UNIT/ML SOPN LOT#[id number]   2 pen 0    carvedilol (COREG) 12.5 MG tablet Take 1 tablet by mouth 2 times daily (with meals) 180 tablet 1    digoxin (LANOXIN) 125 MCG tablet Take 1 tablet by mouth daily 90 tablet 1    folic acid (FOLVITE) 1 MG tablet Take 1 tablet by mouth daily 90 tablet 1    ipratropium-albuterol (DUONEB) 0.5-2.5 (3) MG/3ML SOLN nebulizer solution Inhale 3 mLs into the lungs every 4 hours 360 mL 0    metFORMIN (GLUCOPHAGE) 1000 MG tablet Take 1 tablet Unknown    Vitamin E Diarrhea       Health Maintenance   Topic Date Due    Hepatitis C screen  Never done    COVID-19 Vaccine (1) Never done    Lipid screen  08/31/2021    Flu vaccine (1) 09/01/2021    Shingles Vaccine (1 of 2) 04/04/2023 (Originally 11/22/1995)    Potassium monitoring  05/27/2022    Creatinine monitoring  05/27/2022    Depression Screen  03/10/2023    DTaP/Tdap/Td vaccine (2 - Td or Tdap) 11/11/2026    DEXA (modify frequency per FRAX score)  Completed    Pneumococcal 65+ years Vaccine  Completed    Hepatitis A vaccine  Aged Out    Hib vaccine  Aged Out    Meningococcal (ACWY) vaccine  Aged Out       Subjective:     Review of Systems   Constitutional: Negative for appetite change, fatigue and fever. HENT: Negative for congestion, ear pain, hearing loss and sore throat. Eyes: Negative for discharge and visual disturbance. Respiratory: Negative for cough, chest tightness, shortness of breath and wheezing. Cardiovascular: Negative for chest pain, palpitations and leg swelling. Gastrointestinal: Negative for abdominal pain, constipation, diarrhea, nausea and vomiting. Genitourinary: Negative for flank pain, frequency, hematuria and urgency. Musculoskeletal: Positive for arthralgias. Negative for gait problem, joint swelling and myalgias. Skin: Negative. Neurological: Negative for dizziness, weakness, numbness and headaches. Psychiatric/Behavioral: Negative for dysphoric mood. The patient is nervous/anxious. Objective:     Physical Exam  Vitals reviewed. Constitutional:       Appearance: Normal appearance. She is normal weight. HENT:      Head: Normocephalic and atraumatic. Nose: Nose normal.      Mouth/Throat:      Mouth: Mucous membranes are moist.      Pharynx: Oropharynx is clear. Eyes:      Extraocular Movements: Extraocular movements intact.       Conjunctiva/sclera: Conjunctivae normal.      Pupils: Pupils are equal, round, and reactive to light. Cardiovascular:      Rate and Rhythm: Normal rate and regular rhythm. Heart sounds: Normal heart sounds. No murmur heard. No gallop. Pulmonary:      Effort: Pulmonary effort is normal. No respiratory distress. Breath sounds: Normal breath sounds. No stridor. No wheezing. Abdominal:      General: Bowel sounds are normal. There is no distension. Palpations: Abdomen is soft. Tenderness: There is no abdominal tenderness. There is no guarding or rebound. Musculoskeletal:         General: No swelling or tenderness. Normal range of motion. Cervical back: Normal range of motion and neck supple. Skin:     General: Skin is warm and dry. Coloration: Skin is not jaundiced. Findings: No rash. Neurological:      General: No focal deficit present. Mental Status: She is alert and oriented to person, place, and time. Psychiatric:         Mood and Affect: Mood normal.         Behavior: Behavior normal.         Thought Content: Thought content normal.         Judgment: Judgment normal.       /78 (Site: Right Upper Arm, Position: Sitting, Cuff Size: Medium Adult)   Pulse 80   Temp 97 °F (36.1 °C)   Ht 5' 2\" (1.575 m)   Wt 170 lb (77.1 kg)   SpO2 97%   BMI 31.09 kg/m²     Assessment/Plan:   1. Type 2 diabetes mellitus with other specified complication, with long-term current use of insulin ([facility])  -     POCT glycosylated hemoglobin (Hb A1C)  -     Microalbumin, Ur; Future  -     Dulaglutide (TRULICITY) 7.83 PG/2.7LM SOPN; Inject 0.75 mg into the skin once a week, Disp-4 pen, R-1Normal  -     lisinopril (PRINIVIL;ZESTRIL) 5 MG tablet; Take 1 tablet by mouth daily, Disp-180 tablet, R-1Normal  -     Insulin Glargine, 2 Unit Dial, (TOUJEO MAX SOLOSTAR) 300 UNIT/ML SOPN; LOT#[id number]  Exp1/31/23, Disp-2 pen, R-0Sample  2. Coronary artery disease involving native coronary artery of native heart without angina pectoris  3.  Ischemic cardiomyopathy  -     CBC with Auto Differential; Future  -     Comprehensive Metabolic Panel, Fasting; Future  -     TSH with Reflex; Future  4. Hyperlipidemia, unspecified hyperlipidemia type  -     Lipid, Fasting; Future  5. Chronic obstructive pulmonary disease, unspecified COPD type ([facility] [address]  6. Anxiety  -     LORazepam (ATIVAN) 0.5 MG tablet; Take 1 tablet by mouth every 6 hours as needed for Anxiety for up to 30 days. , Disp-30 tablet, R-0Normal  7. Primary osteoarthritis involving multiple joints  -     traMADol (ULTRAM) 50 MG tablet; Take 1 tablet by mouth every 8 hours as needed for Pain for up to 30 days. , Disp-30 tablet, R-0Normal    Type 2 diabetes-POCT A1c today 9.1, continue Toujeo 30 units twice daily, Metformin 1000 mg twice daily and started on Trulicity 2.27 mg/week. States that she is not able to do sliding scale. Coronary artery disease-on carvedilol, digoxin, statin and aspirin, follows with cardiology regularly    Hypertension-controlled, on lisinopril 5 mg daily, and carvedilol 12.5 mg twice daily     Hyperlipidemia-on pravastatin 80 mg daily and Zetia 10 mg daily    COPD-stable, using albuterol inhaler sparingly    Anxiety-takes Xanax as needed, uses sparingly    Chronic joint pain-taking tramadol only as needed, [place]AR[place] reviewed no concern for misuse. Return in about 3 months (around 6/10/2022) for medicare annual wellness visit.     Orders Placed This Encounter   Procedures    CBC with Auto Differential     Standing Status:   Future     Standing Expiration Date:   9/10/2022    Comprehensive Metabolic Panel, Fasting     Standing Status:   Future     Standing Expiration Date:   9/10/2022    Lipid, Fasting     Standing Status:   Future     Standing Expiration Date:   9/10/2022    TSH with Reflex     Standing Status:   Future     Standing Expiration Date:   9/10/2022    Microalbumin, Ur     Standing Status:   Future     Standing Expiration Date:   6/10/2022    POCT glycosylated hemoglobin (Hb A1C)     Orders Placed This Encounter   Medications    DISCONTD: Insulin Glargine, 2 Unit Dial, (TOUJEO MAX SOLOSTAR) 300 UNIT/ML SOPN     Sig: INJECT 30 UNITS SUBCUTANEOUSLY ONCE DAILY     Dispense:  9 mL     Refill:  1    Dulaglutide (TRULICITY) 8.92 PV/0.2CD SOPN     Sig: Inject 0.75 mg into the skin once a week     Dispense:  4 pen     Refill:  1    lisinopril (PRINIVIL;ZESTRIL) 5 MG tablet     Sig: Take 1 tablet by mouth daily     Dispense:  180 tablet     Refill:  1    traMADol (ULTRAM) 50 MG tablet     Sig: Take 1 tablet by mouth every 8 hours as needed for Pain for up to 30 days. Dispense:  30 tablet     Refill:  0     Reduce doses taken as pain becomes manageable    LORazepam (ATIVAN) 0.5 MG tablet     Sig: Take 1 tablet by mouth every 6 hours as needed for Anxiety for up to 30 days. Dispense:  30 tablet     Refill:  0    Insulin Glargine, 2 Unit Dial, (TOUJEO MAX SOLOSTAR) 300 UNIT/ML SOPN     Sig: LOT#[id number]  Exp1/31/23     Dispense:  2 pen     Refill:  0       Patient given educational materials - see patient instructions. Discussed use, benefit, and side effects of prescribed medications. All patientquestions answered. Pt voiced understanding. Reviewed health maintenance. Instructedto continue current medications, diet and exercise. Patient agreed with treatmentplan. Follow up as directed.      Electronically signed by Maciej Garibay MD on 3/10/2022 at 1:52 PM

## 2022-03-21 NOTE — TELEPHONE ENCOUNTER
Tawyn Gee is calling to request a refill on the following medication(s):    Medication Request:  Requested Prescriptions     Pending Prescriptions Disp Refills    blood glucose test strips (ASCENSIA AUTODISC VI;ONE TOUCH ULTRA TEST VI) strip 300 each 1     Si each by In Vitro route 3 times daily As needed.        Last Visit Date (If Applicable):      Next Visit Date:    6/15/2022

## 2022-03-23 DIAGNOSIS — Z79.4 TYPE 2 DIABETES MELLITUS WITH OTHER SPECIFIED COMPLICATION, WITH LONG-TERM CURRENT USE OF INSULIN (HCC): Primary | ICD-10-CM

## 2022-03-23 DIAGNOSIS — E11.69 TYPE 2 DIABETES MELLITUS WITH OTHER SPECIFIED COMPLICATION, WITH LONG-TERM CURRENT USE OF INSULIN (HCC): Primary | ICD-10-CM

## 2022-03-23 PROCEDURE — 3046F HEMOGLOBIN A1C LEVEL >9.0%: CPT | Performed by: STUDENT IN AN ORGANIZED HEALTH CARE EDUCATION/TRAINING PROGRAM

## 2022-03-23 NOTE — TELEPHONE ENCOUNTER
Yes we see that it was sent in 2 days ago. But a ICD 10 code/ Dx wasn't attached to the Rx. So you will have to cancel the one you did and resend it with a ICD code/Dx.  Thanks

## 2022-03-23 NOTE — TELEPHONE ENCOUNTER
Gary Villaseñor is calling to request a refill on the following medication(s):    Medication Request:  Requested Prescriptions     Pending Prescriptions Disp Refills    blood glucose test strips (ASCENSIA AUTODISC VI;ONE TOUCH ULTRA TEST VI) strip 300 each 1     Si each by In Vitro route 3 times daily As needed.        Last Visit Date (If Applicable):      Next Visit Date:    6/15/2022

## 2022-03-30 NOTE — TELEPHONE ENCOUNTER
Harsh Raza is calling to request a refill on the following medication(s):    Medication Request:  Requested Prescriptions     Pending Prescriptions Disp Refills    Insulin Pen Needle (RELION PEN NEEDLES) 32G X 4 MM MISC 100 each 0     Sig: Use once daily       Last Visit Date (If Applicable):  0/76/4612    Next Visit Date:    6/15/2022

## 2022-03-31 RX ORDER — PEN NEEDLE, DIABETIC 32GX 5/32"
NEEDLE, DISPOSABLE MISCELLANEOUS
Qty: 100 EACH | Refills: 0 | Status: SHIPPED | OUTPATIENT
Start: 2022-03-31 | End: 2022-08-24

## 2022-04-07 LAB
CREATININE URINE: 83.1 MG/DL
MICROALBUMIN/CREAT 24H UR: 142.3 MG/G{CREAT}

## 2022-04-08 DIAGNOSIS — E78.5 HYPERLIPIDEMIA, UNSPECIFIED HYPERLIPIDEMIA TYPE: ICD-10-CM

## 2022-04-08 DIAGNOSIS — I25.5 ISCHEMIC CARDIOMYOPATHY: ICD-10-CM

## 2022-04-08 LAB
ALBUMIN SERPL-MCNC: 4.3 G/DL
ALP BLD-CCNC: 53 U/L
ALT SERPL-CCNC: 25 U/L
AST SERPL-CCNC: 21 U/L
BASOPHILS ABSOLUTE: 0.1 /ΜL
BASOPHILS RELATIVE PERCENT: 1 %
BILIRUB SERPL-MCNC: 0.5 MG/DL (ref 0.1–1.4)
BUN BLDV-MCNC: 16 MG/DL
CALCIUM SERPL-MCNC: 9.1 MG/DL
CHLORIDE BLD-SCNC: 107 MMOL/L
CHOLESTEROL, FASTING: 150
CO2: 18 MMOL/L
CREAT SERPL-MCNC: 0.86 MG/DL
EOSINOPHILS ABSOLUTE: 0.2 /ΜL
EOSINOPHILS RELATIVE PERCENT: 2 %
GLUCOSE FASTING: 201 MG/DL
HCT VFR BLD CALC: 41.2 % (ref 36–46)
HDLC SERPL-MCNC: 34 MG/DL (ref 35–70)
HEMOGLOBIN: 13.6 G/DL (ref 12–16)
LDL CHOLESTEROL CALCULATED: 60 MG/DL (ref 0–160)
LYMPHOCYTES ABSOLUTE: 2.3 /ΜL
LYMPHOCYTES RELATIVE PERCENT: 33 %
MCH RBC QN AUTO: 31.7 PG
MCHC RBC AUTO-ENTMCNC: 33 G/DL
MCV RBC AUTO: 96 FL
MONOCYTES ABSOLUTE: 0.5 /ΜL
MONOCYTES RELATIVE PERCENT: 7 %
NEUTROPHILS ABSOLUTE: 4 /ΜL
NEUTROPHILS RELATIVE PERCENT: 57 %
PDW BLD-RTO: 13.2 %
PLATELET # BLD: 241 K/ΜL
PMV BLD AUTO: 9.8 FL
POTASSIUM SERPL-SCNC: 4.5 MMOL/L
RBC # BLD: 4.29 10^6/ΜL
SODIUM BLD-SCNC: 137 MMOL/L
TOTAL PROTEIN: 6.6 G/DL (ref 6.4–8.2)
TRIGLYCERIDE, FASTING: 282
TSH SERPL DL<=0.05 MIU/L-ACNC: 1.47 UIU/ML
WBC # BLD: 7 10^3/ML

## 2022-04-11 DIAGNOSIS — Z79.4 TYPE 2 DIABETES MELLITUS WITH OTHER SPECIFIED COMPLICATION, WITH LONG-TERM CURRENT USE OF INSULIN (HCC): ICD-10-CM

## 2022-04-11 DIAGNOSIS — E11.69 TYPE 2 DIABETES MELLITUS WITH OTHER SPECIFIED COMPLICATION, WITH LONG-TERM CURRENT USE OF INSULIN (HCC): ICD-10-CM

## 2022-04-12 DIAGNOSIS — Z11.59 NEED FOR HEPATITIS C SCREENING TEST: ICD-10-CM

## 2022-04-12 LAB — ANTIBODY: NORMAL

## 2022-05-17 DIAGNOSIS — E11.69 TYPE 2 DIABETES MELLITUS WITH OTHER SPECIFIED COMPLICATION, WITH LONG-TERM CURRENT USE OF INSULIN (HCC): ICD-10-CM

## 2022-05-17 DIAGNOSIS — Z79.4 TYPE 2 DIABETES MELLITUS WITH OTHER SPECIFIED COMPLICATION, WITH LONG-TERM CURRENT USE OF INSULIN (HCC): ICD-10-CM

## 2022-05-17 RX ORDER — INSULIN GLARGINE 300 U/ML
INJECTION, SOLUTION SUBCUTANEOUS
Qty: 9 ML | Refills: 0 | Status: SHIPPED | OUTPATIENT
Start: 2022-05-17 | End: 2022-06-17

## 2022-05-17 NOTE — TELEPHONE ENCOUNTER
Aravind Prescott is calling to request a refill on the following medication(s):    Medication Request:  Requested Prescriptions     Pending Prescriptions Disp Refills    TOUJEO MAX SOLOSTAR 300 UNIT/ML SOPN [Pharmacy Med Name: Marko Sommers SoloStar 300 UNIT/ML Subcutaneous Solution Pen-injector] 9 mL 0     Sig: INJECT 30 UNITS SUBCUTANEOUSLY ONCE DAILY       Last Visit Date (If Applicable):  1/43/7654    Next Visit Date:    6/15/2022

## 2022-06-15 ENCOUNTER — OFFICE VISIT (OUTPATIENT)
Dept: FAMILY MEDICINE CLINIC | Age: 77
End: 2022-06-15
Payer: COMMERCIAL

## 2022-06-15 VITALS
HEART RATE: 74 BPM | WEIGHT: 170 LBS | HEIGHT: 62 IN | BODY MASS INDEX: 31.28 KG/M2 | TEMPERATURE: 97.2 F | OXYGEN SATURATION: 97 %

## 2022-06-15 DIAGNOSIS — Z00.00 MEDICARE ANNUAL WELLNESS VISIT, SUBSEQUENT: Primary | ICD-10-CM

## 2022-06-15 DIAGNOSIS — E11.9 TYPE 2 DIABETES MELLITUS WITHOUT COMPLICATION, WITHOUT LONG-TERM CURRENT USE OF INSULIN (HCC): ICD-10-CM

## 2022-06-15 LAB — HBA1C MFR BLD: 9.3 %

## 2022-06-15 PROCEDURE — 1123F ACP DISCUSS/DSCN MKR DOCD: CPT | Performed by: STUDENT IN AN ORGANIZED HEALTH CARE EDUCATION/TRAINING PROGRAM

## 2022-06-15 PROCEDURE — 83036 HEMOGLOBIN GLYCOSYLATED A1C: CPT | Performed by: STUDENT IN AN ORGANIZED HEALTH CARE EDUCATION/TRAINING PROGRAM

## 2022-06-15 PROCEDURE — 3046F HEMOGLOBIN A1C LEVEL >9.0%: CPT | Performed by: STUDENT IN AN ORGANIZED HEALTH CARE EDUCATION/TRAINING PROGRAM

## 2022-06-15 PROCEDURE — G0439 PPPS, SUBSEQ VISIT: HCPCS | Performed by: STUDENT IN AN ORGANIZED HEALTH CARE EDUCATION/TRAINING PROGRAM

## 2022-06-15 ASSESSMENT — PATIENT HEALTH QUESTIONNAIRE - PHQ9
SUM OF ALL RESPONSES TO PHQ QUESTIONS 1-9: 0
1. LITTLE INTEREST OR PLEASURE IN DOING THINGS: 0
2. FEELING DOWN, DEPRESSED OR HOPELESS: 0
SUM OF ALL RESPONSES TO PHQ QUESTIONS 1-9: 0
SUM OF ALL RESPONSES TO PHQ QUESTIONS 1-9: 0
SUM OF ALL RESPONSES TO PHQ9 QUESTIONS 1 & 2: 0
SUM OF ALL RESPONSES TO PHQ QUESTIONS 1-9: 0

## 2022-06-15 ASSESSMENT — LIFESTYLE VARIABLES: HOW OFTEN DO YOU HAVE A DRINK CONTAINING ALCOHOL: NEVER

## 2022-06-15 NOTE — PATIENT INSTRUCTIONS
Personalized Preventive Plan for Micheal Pool - 6/15/2022  Medicare offers a range of preventive health benefits. Some of the tests and screenings are paid in full while other may be subject to a deductible, co-insurance, and/or copay. Some of these benefits include a comprehensive review of your medical history including lifestyle, illnesses that may run in your family, and various assessments and screenings as appropriate. After reviewing your medical record and screening and assessments performed today your provider may have ordered immunizations, labs, imaging, and/or referrals for you. A list of these orders (if applicable) as well as your Preventive Care list are included within your After Visit Summary for your review. Other Preventive Recommendations:    · A preventive eye exam performed by an eye specialist is recommended every 1-2 years to screen for glaucoma; cataracts, macular degeneration, and other eye disorders. · A preventive dental visit is recommended every 6 months. · Try to get at least 150 minutes of exercise per week or 10,000 steps per day on a pedometer . · Order or download the FREE \"Exercise & Physical Activity: Your Everyday Guide\" from [employer]. Call 8-119.518.2325 or search [employer] online. · You need 2013-3190 mg of calcium and 9064-1095 IU of vitamin D per day. It is possible to meet your calcium requirement with diet alone, but a vitamin D supplement is usually necessary to meet this goal.  · When exposed to the sun, use a sunscreen that protects against both UVA and UVB radiation with an SPF of 30 or greater. Reapply every 2 to 3 hours or after sweating, drying off with a towel, or swimming. · Always wear a seat belt when traveling in a car. Always wear a helmet when riding a bicycle or motorcycle.

## 2022-06-15 NOTE — PROGRESS NOTES
ACP-Advance Directive ACP-Power of     Not on File Not on File Not on File Not on File      General Health Risk Interventions:  · None    Health Habits/Nutrition:     Physical Activity: Insufficiently Active    Days of Exercise per Week: 5 days    Minutes of Exercise per Session: 20 min     Have you lost any weight without trying in the past 3 months?: No  Body mass index: (!) 31.09  Have you seen the dentist within the past year?: Yes    Health Habits/Nutrition Interventions:  · none     Safety:  Do you have working smoke detectors?: Yes  Do you have any tripping hazards - loose or unsecured carpets or rugs?: (!) Yes  Do you have any tripping hazards - clutter in doorways, halls, or stairs?: No  Do you have either shower bars, grab bars, non-slip mats or non-slip surfaces in your shower or bathtub?: Yes  Do all of your stairways have a railing or banister?: Yes  Do you always fasten your seatbelt when you are in a car?: Yes    Safety Interventions:  · Patient declines any further evaluation/treatment for this issue           Objective   Vitals:    06/15/22 1021   Pulse: 74   Temp: 97.2 °F (36.2 °C)   SpO2: 97%   Weight: 170 lb (77.1 kg)   Height: 5' 2\" (1.575 m)      Body mass index is 31.09 kg/m².         General Appearance: alert and oriented to person, place and time, well developed and well- nourished, in no acute distress  Skin: warm and dry, no rash or erythema  Head: normocephalic and atraumatic  Eyes: pupils equal, round, and reactive to light, extraocular eye movements intact, conjunctivae normal  ENT: tympanic membrane, external ear and ear canal normal bilaterally, nose without deformity, nasal mucosa and turbinates normal without polyps  Neck: supple and non-tender without mass, no thyromegaly or thyroid nodules, no cervical lymphadenopathy  Pulmonary/Chest: clear to auscultation bilaterally- no wheezes, rales or rhonchi, normal air movement, no respiratory distress  Cardiovascular: normal rate, regular rhythm, normal S1 and S2, no murmurs, rubs, clicks, or gallops, distal pulses intact, no carotid bruits  Abdomen: soft, non-tender, non-distended, normal bowel sounds, no masses or organomegaly  Extremities: no cyanosis, clubbing or edema  Musculoskeletal: normal range of motion, no joint swelling, deformity or tenderness  Neurologic: reflexes normal and symmetric, no cranial nerve deficit, gait, coordination and speech normal       Allergies   Allergen Reactions    Docosahexaenoic Acid (Dha) Diarrhea    Mineral Oil Diarrhea    Niacin Itching     Slow Niacin    Nicotine     Other Diarrhea    Sulfa Antibiotics      Other reaction(s): Intolerance-unknown  Other reaction(s): Intolerance-unknown  Other reaction(s): Unknown    Vitamin E Diarrhea     Prior to Visit Medications    Medication Sig Taking? Authorizing Provider   dapagliflozin (FARXIGA) 5 MG tablet Take 1 tablet by mouth every morning Yes MD VAL CadeUJEO MAX SOLOSTAR 300 UNIT/ML SOPN INJECT 30 UNITS SUBCUTANEOUSLY ONCE DAILY Yes Bette Bee MD   Insulin Pen Needle (RELION PEN NEEDLES) 32G X 4 MM MISC Use once daily Yes Bette Bee MD   blood glucose test strips (ASCENSIA AUTODISC VI;ONE TOUCH ULTRA TEST VI) strip 1 each by In Vitro route 3 times daily As needed.  Yes Elsy Hernandez APRN - CNP   Dulaglutide (TRULICITY) 8.39 SM/8.8JG SOPN Inject 0.75 mg into the skin once a week Yes Bette Bee MD   lisinopril (PRINIVIL;ZESTRIL) 5 MG tablet Take 1 tablet by mouth daily Yes Bette Bee MD   Insulin Glargine, 2 Unit Dial, (TOUJEO MAX SOLOSTAR) 300 UNIT/ML SOPN LOT#[id number]  Exp1/31/23 Yes Bette Bee MD   carvedilol (COREG) 12.5 MG tablet Take 1 tablet by mouth 2 times daily (with meals) Yes Bette Bee MD   digoxin (LANOXIN) 125 MCG tablet Take 1 tablet by mouth daily Yes Bette Bee MD   folic acid (FOLVITE) 1 MG tablet Take 1 tablet by mouth daily Yes Bette Bee MD   metFORMIN (GLUCOPHAGE) 1000 MG tablet Take 1 tablet twice daily Yes Scott Abarca MD   pravastatin (PRAVACHOL) 80 MG tablet Take 1 tablet by mouth daily Yes Scott Abarca MD   ReliOn Ultra Thin Lancets 30G MISC USE 1  ONCE DAILY Yes Fernando Garcia MD   Insulin Glargine, 2 Unit Dial, (TOUJEO MAX SOLOSTAR) 300 UNIT/ML SOPN Lot [id number] exp 11/30/2022 Yes Fernando Garcia MD   Blood Glucose Monitoring Suppl (ONE TOUCH ULTRA 2) w/Device KIT 1 kit by Does not apply route daily Yes Fernando Garcia MD   Blood Glucose Monitoring Suppl (EASY STEP GLUCOSE MONITOR) w/Device KIT 1 each by Does not apply route 3 times daily Yes Fernando Garcia MD   ezetimibe (ZETIA) 10 MG tablet Take 10 mg by mouth daily Yes Historical Provider, MD   blood glucose test strips (ONE TOUCH ULTRA TEST) strip 1 each by Does not apply route daily Dx E 11.9 patient test TID Yes Fernando Garcia MD   Lancets 30G MISC 1 each by Does not apply route daily Yes Fernando Garcia MD   ONE TOUCH LANCETS MISC 1 each by Does not apply route 3 times daily Dx E 11.9 Yes Fernando Garcia MD   aspirin 81 MG tablet Take 162 mg by mouth Yes Historical Provider, MD   Niacin (VITAMIN B-3 PO) Take 500 mg by mouth Yes Historical Provider, MD   Lutein 10 MG TABS Take 10 mg by mouth Yes Historical Provider, MD   Blood Glucose Monitoring Suppl KIT Please dispense machine with lancets and testing strips, her diagnoses code is 250.00 Yes Varun Mae PA-C       [facility] (Including outside providers/suppliers regularly involved in providing care):   Patient Care Team:  Scott Abarca MD as PCP - General (Family Medicine)  Scott Abarca MD as PCP [facility]     Reviewed and updated this visit:  Tobacco  Allergies  Meds  Problems  Med Hx  Surg Hx  Soc Hx  Fam Hx

## 2022-06-16 DIAGNOSIS — E11.69 TYPE 2 DIABETES MELLITUS WITH OTHER SPECIFIED COMPLICATION, WITH LONG-TERM CURRENT USE OF INSULIN (HCC): ICD-10-CM

## 2022-06-16 DIAGNOSIS — I25.10 CORONARY ARTERY DISEASE INVOLVING NATIVE CORONARY ARTERY OF NATIVE HEART WITHOUT ANGINA PECTORIS: ICD-10-CM

## 2022-06-16 DIAGNOSIS — Z79.4 TYPE 2 DIABETES MELLITUS WITH OTHER SPECIFIED COMPLICATION, WITH LONG-TERM CURRENT USE OF INSULIN (HCC): ICD-10-CM

## 2022-06-17 RX ORDER — INSULIN GLARGINE 300 U/ML
INJECTION, SOLUTION SUBCUTANEOUS
Qty: 6 ML | Refills: 0 | Status: SHIPPED | OUTPATIENT
Start: 2022-06-17 | End: 2022-10-06 | Stop reason: SDUPTHER

## 2022-06-17 RX ORDER — DULAGLUTIDE 0.75 MG/.5ML
INJECTION, SOLUTION SUBCUTANEOUS
Qty: 4 ML | Refills: 0 | Status: SHIPPED | OUTPATIENT
Start: 2022-06-17

## 2022-06-17 RX ORDER — FOLIC ACID 1 MG/1
TABLET ORAL
Qty: 90 TABLET | Refills: 0 | Status: SHIPPED | OUTPATIENT
Start: 2022-06-17

## 2022-06-17 NOTE — TELEPHONE ENCOUNTER
Karen Childress is calling to request a refill on the following medication(s):    Medication Request:  Requested Prescriptions     Pending Prescriptions Disp Refills    TOUJEO MAX SOLOSTAR 300 UNIT/ML SOPN [Pharmacy Med Name: Irvin Hernandez Max SoloStar 300 UNIT/ML Subcutaneous Solution Pen-injector] 6 mL 0     Sig: INJECT 30 UNITS SUBCUTANEOUSLY ONCE DAILY    metFORMIN (GLUCOPHAGE) 1000 MG tablet [Pharmacy Med Name: metFORMIN HCl 1000 MG Oral Tablet] 180 tablet 0     Sig: Take 1 tablet by mouth twice daily    folic acid (FOLVITE) 1 MG tablet [Pharmacy Med Name: Folic Acid 1 MG Oral Tablet] 90 tablet 0     Sig: Take 1 tablet by mouth once daily    TRULICITY 8.99 AQ/4.7MY SOPN [Pharmacy Med Name: Trulicity 4.21 RY/6.9LD Subcutaneous Solution Pen-injector] 4 mL 0     Sig: INJECT 1 SYRINGE SUBCUTANEOUSLY ONCE A WEEK       Last Visit Date (If Applicable):  6/79/6367    Next Visit Date:    9/21/2022

## 2022-08-23 LAB
CHOLESTEROL/HDL RATIO: 4.4
CHOLESTEROL: 132 MG/DL
HDLC SERPL-MCNC: 30 MG/DL (ref 45–60)
LDL CHOLESTEROL: 45 MG/DL
TRIGL SERPL-MCNC: 286 MG/DL
VLDLC SERPL CALC-MCNC: 57 MG/DL (ref 5–35)

## 2022-08-24 RX ORDER — PEN NEEDLE, DIABETIC 32GX 5/32"
NEEDLE, DISPOSABLE MISCELLANEOUS
Qty: 100 EACH | Refills: 0 | Status: SHIPPED | OUTPATIENT
Start: 2022-08-24

## 2022-08-24 NOTE — TELEPHONE ENCOUNTER
Lynsey Zamora is calling to request a refill on the following medication(s):    Medication Request:  Requested Prescriptions     Pending Prescriptions Disp Refills    Insulin Pen Needle (RELION PEN NEEDLES) 32G X 4 MM MISC [Pharmacy Med Name: Isak Rodriguez PEN NEEDLE [id number] MIS] 100 each 0     Sig: USE 1  ONCE DAILY       Last Visit Date (If Applicable):  9/48/3421    Next Visit Date:    9/21/2022

## 2022-09-13 DIAGNOSIS — E11.69 TYPE 2 DIABETES MELLITUS WITH OTHER SPECIFIED COMPLICATION, WITH LONG-TERM CURRENT USE OF INSULIN (HCC): ICD-10-CM

## 2022-09-13 DIAGNOSIS — Z79.4 TYPE 2 DIABETES MELLITUS WITH OTHER SPECIFIED COMPLICATION, WITH LONG-TERM CURRENT USE OF INSULIN (HCC): ICD-10-CM

## 2022-09-14 RX ORDER — LISINOPRIL 5 MG/1
5 TABLET ORAL DAILY
Qty: 90 TABLET | Refills: 1 | Status: SHIPPED | OUTPATIENT
Start: 2022-09-14

## 2022-09-14 NOTE — TELEPHONE ENCOUNTER
[facility] is calling to request a refill on the following medication(s):    Medication Request:  Requested Prescriptions     Pending Prescriptions Disp Refills    lisinopril (PRINIVIL;ZESTRIL) 5 MG tablet [Pharmacy Med Name: Lisinopril 5 MG Oral Tablet] 180 tablet 0     Sig: Take 1 tablet by mouth twice daily       Last Visit Date (If Applicable):  3/30/6384    Next Visit Date:    9/21/2022

## 2022-10-06 DIAGNOSIS — E11.69 TYPE 2 DIABETES MELLITUS WITH OTHER SPECIFIED COMPLICATION, WITH LONG-TERM CURRENT USE OF INSULIN (HCC): ICD-10-CM

## 2022-10-06 DIAGNOSIS — Z79.4 TYPE 2 DIABETES MELLITUS WITH OTHER SPECIFIED COMPLICATION, WITH LONG-TERM CURRENT USE OF INSULIN (HCC): ICD-10-CM

## 2022-10-06 RX ORDER — INSULIN GLARGINE 300 U/ML
INJECTION, SOLUTION SUBCUTANEOUS
Qty: 6 ML | Refills: 0 | Status: SHIPPED | OUTPATIENT
Start: 2022-10-06

## 2022-10-06 NOTE — TELEPHONE ENCOUNTER
Requested Prescriptions     Pending Prescriptions Disp Refills    Insulin Glargine, 2 Unit Dial, (TOUJEO MAX SOLOSTAR) 300 UNIT/ML SOPN 6 mL 0

## 2022-11-17 DIAGNOSIS — Z79.4 TYPE 2 DIABETES MELLITUS WITH OTHER SPECIFIED COMPLICATION, WITH LONG-TERM CURRENT USE OF INSULIN (HCC): ICD-10-CM

## 2022-11-17 DIAGNOSIS — I25.10 CORONARY ARTERY DISEASE INVOLVING NATIVE CORONARY ARTERY OF NATIVE HEART WITHOUT ANGINA PECTORIS: ICD-10-CM

## 2022-11-17 DIAGNOSIS — E11.69 TYPE 2 DIABETES MELLITUS WITH OTHER SPECIFIED COMPLICATION, WITH LONG-TERM CURRENT USE OF INSULIN (HCC): ICD-10-CM

## 2022-11-17 RX ORDER — FOLIC ACID 1 MG/1
TABLET ORAL
Qty: 90 TABLET | Refills: 0 | Status: SHIPPED | OUTPATIENT
Start: 2022-11-17

## 2022-11-17 NOTE — TELEPHONE ENCOUNTER
Sandra Cherry is calling to request a refill on the following medication(s):    Medication Request:  Requested Prescriptions     Pending Prescriptions Disp Refills    folic acid (FOLVITE) 1 MG tablet 90 tablet 0     Sig: Take 1 tablet by mouth once daily    metFORMIN (GLUCOPHAGE) 1000 MG tablet 180 tablet 0     Sig: Take 1 tablet by mouth twice daily       Last Visit Date (If Applicable):  6/57/3812    Next Visit Date:    12/15/2022

## 2022-12-15 ENCOUNTER — OFFICE VISIT (OUTPATIENT)
Dept: FAMILY MEDICINE CLINIC | Age: 77
End: 2022-12-15
Payer: COMMERCIAL

## 2022-12-15 VITALS
HEART RATE: 71 BPM | OXYGEN SATURATION: 97 % | DIASTOLIC BLOOD PRESSURE: 78 MMHG | SYSTOLIC BLOOD PRESSURE: 130 MMHG | TEMPERATURE: 97.4 F

## 2022-12-15 DIAGNOSIS — E11.9 TYPE 2 DIABETES MELLITUS WITHOUT COMPLICATION, WITH LONG-TERM CURRENT USE OF INSULIN (HCC): Primary | ICD-10-CM

## 2022-12-15 DIAGNOSIS — M15.9 PRIMARY OSTEOARTHRITIS INVOLVING MULTIPLE JOINTS: ICD-10-CM

## 2022-12-15 DIAGNOSIS — Z79.4 TYPE 2 DIABETES MELLITUS WITHOUT COMPLICATION, WITH LONG-TERM CURRENT USE OF INSULIN (HCC): Primary | ICD-10-CM

## 2022-12-15 DIAGNOSIS — F41.9 ANXIETY: ICD-10-CM

## 2022-12-15 DIAGNOSIS — I50.22 CHRONIC SYSTOLIC CONGESTIVE HEART FAILURE (HCC): ICD-10-CM

## 2022-12-15 LAB — HBA1C MFR BLD: 8.7 %

## 2022-12-15 PROCEDURE — 83036 HEMOGLOBIN GLYCOSYLATED A1C: CPT | Performed by: STUDENT IN AN ORGANIZED HEALTH CARE EDUCATION/TRAINING PROGRAM

## 2022-12-15 PROCEDURE — 1123F ACP DISCUSS/DSCN MKR DOCD: CPT | Performed by: STUDENT IN AN ORGANIZED HEALTH CARE EDUCATION/TRAINING PROGRAM

## 2022-12-15 PROCEDURE — 3052F HG A1C>EQUAL 8.0%<EQUAL 9.0%: CPT | Performed by: STUDENT IN AN ORGANIZED HEALTH CARE EDUCATION/TRAINING PROGRAM

## 2022-12-15 PROCEDURE — 99214 OFFICE O/P EST MOD 30 MIN: CPT | Performed by: STUDENT IN AN ORGANIZED HEALTH CARE EDUCATION/TRAINING PROGRAM

## 2022-12-15 RX ORDER — PEN NEEDLE, DIABETIC 32GX 5/32"
NEEDLE, DISPOSABLE MISCELLANEOUS
Qty: 100 EACH | Refills: 0 | Status: SHIPPED | OUTPATIENT
Start: 2022-12-15

## 2022-12-15 RX ORDER — LANCETS 30 GAUGE
EACH MISCELLANEOUS
Qty: 100 EACH | Refills: 0 | Status: SHIPPED | OUTPATIENT
Start: 2022-12-15

## 2022-12-15 RX ORDER — LORAZEPAM 0.5 MG/1
0.5 TABLET ORAL DAILY PRN
Qty: 30 TABLET | Refills: 3 | Status: SHIPPED | OUTPATIENT
Start: 2022-12-15 | End: 2023-01-14

## 2022-12-15 RX ORDER — TRAMADOL HYDROCHLORIDE 50 MG/1
50 TABLET ORAL DAILY PRN
Qty: 30 TABLET | Refills: 3 | Status: SHIPPED | OUTPATIENT
Start: 2022-12-15 | End: 2023-01-14

## 2022-12-15 ASSESSMENT — ENCOUNTER SYMPTOMS
NAUSEA: 0
DIARRHEA: 0
WHEEZING: 0
CONSTIPATION: 0
CHEST TIGHTNESS: 0
ABDOMINAL PAIN: 0
SORE THROAT: 0
VOMITING: 0
COUGH: 0
EYE DISCHARGE: 0
SHORTNESS OF BREATH: 0

## 2022-12-15 ASSESSMENT — PATIENT HEALTH QUESTIONNAIRE - PHQ9
SUM OF ALL RESPONSES TO PHQ QUESTIONS 1-9: 0
SUM OF ALL RESPONSES TO PHQ9 QUESTIONS 1 & 2: 0
SUM OF ALL RESPONSES TO PHQ QUESTIONS 1-9: 0
2. FEELING DOWN, DEPRESSED OR HOPELESS: 0
SUM OF ALL RESPONSES TO PHQ QUESTIONS 1-9: 0
1. LITTLE INTEREST OR PLEASURE IN DOING THINGS: 0
SUM OF ALL RESPONSES TO PHQ QUESTIONS 1-9: 0

## 2022-12-15 NOTE — PROGRESS NOTES
[address] PRIMARY CARE  [address] [place] [address] [address] [place] [address]  Dept: [phone]  Dept Fax: [phone]    Heloise Bosworth is a 65 y.o. female who is a Established patient, who presents today for her medical conditions/complaints as noted below:  Chief Complaint   Patient presents with    Diabetes     Type 2     Discuss Medications     Pt is requesting lorazepam and tramadol for her pain. HPI:     She is here today to follow-up on diabetes. She says that she stopped taking the [place] and the Trulicity because they were very expensive. She is only been taking Toujeo 28 units daily and does not want to increase the dose saying that will increase her cost of medication. She says that her blood sugars normally stay below 200. She does have CAD and congestive heart failure for which she is following with cardiology regularly. She is requesting refills on Ativan and tramadol, has been using them very sparingly.           Hemoglobin A1C (%)   Date Value   12/15/2022 8.7   06/15/2022 9.3   03/10/2022 9.1             ( goal A1Cis < 7)   Microalbumin, Random Urine (mg/L)   Date Value   08/31/2020 72.6     LDL Cholesterol (mg/dL)   Date Value   08/23/2022 45   08/31/2020 61   06/09/2020 81     LDL Calculated (mg/dL)   Date Value   04/07/2022 60   04/21/2017 84   08/29/2016 63       (goal LDL is <100)   AST (U/L)   Date Value   04/07/2022 21     ALT (U/L)   Date Value   04/07/2022 25     BUN (mg/dL)   Date Value   04/07/2022 16     BP Readings from Last 3 Encounters:   12/15/22 130/78   03/10/22 130/78   05/26/21 128/74          (goal 120/80)    Past Medical History:   Diagnosis Date    Anxiety     Chronic systolic congestive heart failure ([address] 12/15/2022    Coronary atherosclerosis 8/5/2020    Hyperlipidemia     Osteoarthritis     Type II or unspecified type diabetes mellitus without mention of complication, not stated as uncontrolled       Past Surgical History:   Procedure Laterality Date    CARDIAC SURGERY      KNEE SURGERY      TONSILLECTOMY         Family History   Problem Relation Age of Onset    Cancer Mother         hodgkins    Diabetes Father        Social History     Tobacco Use    Smoking status: Former     Packs/day: 0.50     Years: 30.00     Pack years: 15.00     Types: Cigarettes     Start date: 12/11/1979     Quit date: 6/1/2007     Years since quitting: 15.5    Smokeless tobacco: Never   Substance Use Topics    Alcohol use: No      Current Outpatient Medications   Medication Sig Dispense Refill    Insulin Pen Needle (RELION PEN NEEDLES) 32G X 4 MM MISC USE 1  ONCE DAILY 100 each 0    ReliOn Ultra Thin Lancets 30G MISC USE 1  ONCE DAILY 100 each 0    LORazepam (ATIVAN) 0.5 MG tablet Take 1 tablet by mouth daily as needed for Anxiety for up to 30 days. 30 tablet 3    traMADol (ULTRAM) 50 MG tablet Take 1 tablet by mouth daily as needed for Pain for up to 30 days. Intended supply: 3 days. Take lowest dose possible to manage pain 30 tablet 3    Insulin Glargine, 2 Unit Dial, 300 UNIT/ML SOPN Inject 28 Units into the skin daily 5 Adjustable Dose Pre-filled Pen Syringe 2    insulin glargine, 1 unit dial, (TOUJEO) 300 UNIT/ML concentrated injection pen [id number] exp 1/31/23 2 Adjustable Dose Pre-filled Pen Syringe 0    folic acid (FOLVITE) 1 MG tablet Take 1 tablet by mouth once daily 90 tablet 0    metFORMIN (GLUCOPHAGE) 1000 MG tablet Take 1 tablet by mouth twice daily 180 tablet 0    lisinopril (PRINIVIL;ZESTRIL) 5 MG tablet Take 1 tablet by mouth daily 90 tablet 1    blood glucose test strips (ASCENSIA AUTODISC VI;ONE TOUCH ULTRA TEST VI) strip 1 each by In Vitro route 3 times daily As needed.  300 each 1    carvedilol (COREG) 12.5 MG tablet Take 1 tablet by mouth 2 times daily (with meals) 180 tablet 1    digoxin (LANOXIN) 125 MCG tablet Take 1 tablet by mouth daily 90 tablet 1    pravastatin (PRAVACHOL) 80 MG tablet Take 1 tablet by mouth daily 90 tablet 1    Blood Glucose Monitoring Suppl (ONE TOUCH ULTRA 2) w/Device KIT 1 kit by Does not apply route daily 1 kit 0    Blood Glucose Monitoring Suppl (EASY STEP GLUCOSE MONITOR) w/Device KIT 1 each by Does not apply route 3 times daily 1 kit 0    blood glucose test strips (ONE TOUCH ULTRA TEST) strip 1 each by Does not apply route daily Dx E 11.9 patient test  strip 11    Lancets 30G MISC 1 each by Does not apply route daily 100 each 3    ONE TOUCH LANCETS MISC 1 each by Does not apply route 3 times daily Dx E 11.9 100 each 11    aspirin 81 MG tablet Take 162 mg by mouth      Niacin (VITAMIN B-3 PO) Take 500 mg by mouth      Lutein 10 MG TABS Take 10 mg by mouth      Blood Glucose Monitoring Suppl KIT Please dispense machine with lancets and testing strips, her diagnoses code is 250.00 1 kit 0     No current facility-administered medications for this visit. Allergies   Allergen Reactions    Docosahexaenoic Acid (Dha) Diarrhea    Mineral Oil Diarrhea    Niacin Itching     Slow Niacin    Nicotine     Other Diarrhea    Sulfa Antibiotics      Other reaction(s): Intolerance-unknown  Other reaction(s): Intolerance-unknown  Other reaction(s): Unknown    Vitamin E Diarrhea       Health Maintenance   Topic Date Due    COVID-19 Vaccine (1) Never done    DEXA (modify frequency per FRAX score)  09/27/2019    Shingles vaccine (1 of 2) 04/04/2023 (Originally 11/22/1994)    Flu vaccine (1) 12/15/2023 (Originally 8/1/2022)    Depression Screen  06/15/2023    Lipids  08/23/2023    DTaP/Tdap/Td vaccine (2 - Td or Tdap) 11/11/2026    Pneumococcal 65+ years Vaccine  Completed    Hepatitis C screen  Completed    Hepatitis A vaccine  Aged Out    Hib vaccine  Aged Out    Meningococcal (ACWY) vaccine  Aged Out       Subjective:     Review of Systems   Constitutional:  Negative for appetite change, fatigue and fever. HENT:  Negative for congestion, ear pain, hearing loss and sore throat.     Eyes:  Negative for discharge and visual person, place, and time. Psychiatric:         Mood and Affect: Mood normal.         Behavior: Behavior normal.         Thought Content: Thought content normal.         Judgment: Judgment normal.     /78   Pulse 71   Temp 97.4 °F (36.3 °C)   SpO2 97%     Assessment/Plan:   1. Type 2 diabetes mellitus without complication, with long-term current use of insulin ([facility])  -     POCT glycosylated hemoglobin (Hb A1C)  -     Insulin Pen Needle (RELION PEN NEEDLES) 32G X 4 MM MISC; Disp-100 each, R-0, NormalUSE 1  ONCE DAILY  -     ReliOn Ultra Thin Lancets 30G MISC; Disp-100 each, R-0, NormalUSE 1  ONCE DAILY  -     Insulin Glargine, 2 Unit Dial, 300 UNIT/ML SOPN; Inject 28 Units into the skin daily, Disp-5 Adjustable Dose Pre-filled Pen Syringe, R-2Normal  -     insulin glargine, 1 unit dial, (TOUJEO) 300 UNIT/ML concentrated injection pen; Lot:[id number] exp 1/31/23, Disp-2 Adjustable Dose Pre-filled Pen Syringe, R-0Sample  2. Chronic systolic congestive heart failure ([address]  3. Primary osteoarthritis involving multiple joints  -     traMADol (ULTRAM) 50 MG tablet; Take 1 tablet by mouth daily as needed for Pain for up to 30 days. Intended supply: 3 days. Take lowest dose possible to manage pain, Disp-30 tablet, R-3Normal  4. Anxiety  -     LORazepam (ATIVAN) 0.5 MG tablet; Take 1 tablet by mouth daily as needed for Anxiety for up to 30 days. , Disp-30 tablet, R-3Normal      Type 2 diabetes-POCT A1c today 8.7, on Toujeo 28 units daily and metformin 1000 mg twice daily. Discontinue Hanna and Luigi Jonas due to cost.    Chronic heart failure-on beta-blocker, digoxin and lisinopril, following with cardiology regularly    Osteoarthritis-takes tramadol as needed, OARRS reviewed    Anxiety-takes Ativan 0.5 mg as needed, OARRS reviewed      Return in about 6 months (around 6/15/2023) for medicare annual wellness visit.     Orders Placed This Encounter   Procedures    POCT glycosylated hemoglobin (Hb A1C)     Orders Placed This Encounter   Medications    Insulin Pen Needle (RELION PEN NEEDLES) 32G X 4 MM MISC     Sig: USE 1  ONCE DAILY     Dispense:  100 each     Refill:  0    ReliOn Ultra Thin Lancets 30G MISC     Sig: USE 1  ONCE DAILY     Dispense:  100 each     Refill:  0    LORazepam (ATIVAN) 0.5 MG tablet     Sig: Take 1 tablet by mouth daily as needed for Anxiety for up to 30 days. Dispense:  30 tablet     Refill:  3    traMADol (ULTRAM) 50 MG tablet     Sig: Take 1 tablet by mouth daily as needed for Pain for up to 30 days. Intended supply: 3 days. Take lowest dose possible to manage pain     Dispense:  30 tablet     Refill:  3     Reduce doses taken as pain becomes manageable    Insulin Glargine, 2 Unit Dial, 300 UNIT/ML SOPN     Sig: Inject 28 Units into the skin daily     Dispense:  5 Adjustable Dose Pre-filled Pen Syringe     Refill:  2    insulin glargine, 1 unit dial, (TOUJEO) 300 UNIT/ML concentrated injection pen     Sig: [id number] exp 1/31/23     Dispense:  2 Adjustable Dose Pre-filled Pen Syringe     Refill:  0       Patient given educational materials - see patient instructions. Discussed use, benefit, and side effects of prescribed medications. All patientquestions answered. Pt voiced understanding. Reviewed health maintenance. Instructedto continue current medications, diet and exercise. Patient agreed with treatmentplan. Follow up as directed.      Electronically signed by Brian Joseph MD on 12/15/2022 at 1:04 PM

## 2023-01-10 DIAGNOSIS — E11.69 TYPE 2 DIABETES MELLITUS WITH OTHER SPECIFIED COMPLICATION, WITH LONG-TERM CURRENT USE OF INSULIN (HCC): ICD-10-CM

## 2023-01-10 DIAGNOSIS — I25.10 CORONARY ARTERY DISEASE INVOLVING NATIVE CORONARY ARTERY OF NATIVE HEART WITHOUT ANGINA PECTORIS: ICD-10-CM

## 2023-01-10 DIAGNOSIS — Z79.4 TYPE 2 DIABETES MELLITUS WITH OTHER SPECIFIED COMPLICATION, WITH LONG-TERM CURRENT USE OF INSULIN (HCC): ICD-10-CM

## 2023-01-10 RX ORDER — FOLIC ACID 1 MG/1
TABLET ORAL
Qty: 90 TABLET | Refills: 0 | Status: SHIPPED | OUTPATIENT
Start: 2023-01-10

## 2023-01-10 NOTE — TELEPHONE ENCOUNTER
Kamilah Anthony is calling to request a refill on the following medication(s):    Medication Request:  Requested Prescriptions     Pending Prescriptions Disp Refills    metFORMIN (GLUCOPHAGE) 1000 MG tablet [Pharmacy Med Name: metFORMIN HCl 1000 MG Oral Tablet] 180 tablet 0     Sig: Take 1 tablet by mouth twice daily    folic acid (FOLVITE) 1 MG tablet [Pharmacy Med Name: Folic Acid 1 MG Oral Tablet] 90 tablet 0     Sig: Take 1 tablet by mouth once daily       Last Visit Date (If Applicable):  54/65/9930    Next Visit Date:    6/19/2023

## 2023-02-03 DIAGNOSIS — Z79.4 TYPE 2 DIABETES MELLITUS WITHOUT COMPLICATION, WITH LONG-TERM CURRENT USE OF INSULIN (HCC): ICD-10-CM

## 2023-02-03 DIAGNOSIS — E11.9 TYPE 2 DIABETES MELLITUS WITHOUT COMPLICATION, WITH LONG-TERM CURRENT USE OF INSULIN (HCC): ICD-10-CM

## 2023-02-06 RX ORDER — PEN NEEDLE, DIABETIC 32GX 5/32"
NEEDLE, DISPOSABLE MISCELLANEOUS
Qty: 100 EACH | Refills: 1 | Status: SHIPPED | OUTPATIENT
Start: 2023-02-06

## 2023-02-06 NOTE — TELEPHONE ENCOUNTER
Janay Riggs is calling to request a refill on the following medication(s):    Medication Request:  Requested Prescriptions     Pending Prescriptions Disp Refills    RELION PEN NEEDLES 32G X 4 MM MISC [Pharmacy Med Name: Sofiya Desai PEN NEEDLE [id number] MIS] 100 each 0     Sig: USE 1  ONCE DAILY       Last Visit Date (If Applicable):  89/92/4189    Next Visit Date:    6/19/2023

## 2023-02-09 DIAGNOSIS — E11.9 TYPE 2 DIABETES MELLITUS WITHOUT COMPLICATION, WITH LONG-TERM CURRENT USE OF INSULIN (HCC): ICD-10-CM

## 2023-02-09 DIAGNOSIS — Z79.4 TYPE 2 DIABETES MELLITUS WITH OTHER SPECIFIED COMPLICATION, WITH LONG-TERM CURRENT USE OF INSULIN (HCC): ICD-10-CM

## 2023-02-09 DIAGNOSIS — Z79.4 TYPE 2 DIABETES MELLITUS WITHOUT COMPLICATION, WITH LONG-TERM CURRENT USE OF INSULIN (HCC): ICD-10-CM

## 2023-02-09 DIAGNOSIS — E11.69 TYPE 2 DIABETES MELLITUS WITH OTHER SPECIFIED COMPLICATION, WITH LONG-TERM CURRENT USE OF INSULIN (HCC): ICD-10-CM

## 2023-02-09 RX ORDER — BLOOD-GLUCOSE METER
EACH MISCELLANEOUS
Qty: 1 KIT | Refills: 0 | Status: SHIPPED | OUTPATIENT
Start: 2023-02-09

## 2023-02-17 ENCOUNTER — TELEPHONE (OUTPATIENT)
Dept: FAMILY MEDICINE CLINIC | Age: 78
End: 2023-02-17

## 2023-02-17 DIAGNOSIS — R09.81 SINUS CONGESTION: Primary | ICD-10-CM

## 2023-02-17 RX ORDER — AMOXICILLIN AND CLAVULANATE POTASSIUM 875; 125 MG/1; MG/1
1 TABLET, FILM COATED ORAL 2 TIMES DAILY
Qty: 10 TABLET | Refills: 0 | Status: SHIPPED | OUTPATIENT
Start: 2023-02-17 | End: 2023-02-22

## 2023-02-17 NOTE — TELEPHONE ENCOUNTER
Patient is having some sinus pain under her left eye with a little swelling started Wednesday . She would like a script .

## 2023-03-14 DIAGNOSIS — E11.69 TYPE 2 DIABETES MELLITUS WITH OTHER SPECIFIED COMPLICATION, WITH LONG-TERM CURRENT USE OF INSULIN (HCC): ICD-10-CM

## 2023-03-14 DIAGNOSIS — Z79.4 TYPE 2 DIABETES MELLITUS WITH OTHER SPECIFIED COMPLICATION, WITH LONG-TERM CURRENT USE OF INSULIN (HCC): ICD-10-CM

## 2023-03-15 RX ORDER — LISINOPRIL 5 MG/1
TABLET ORAL
Qty: 90 TABLET | Refills: 1 | Status: SHIPPED | OUTPATIENT
Start: 2023-03-15

## 2023-03-22 DIAGNOSIS — E11.69 TYPE 2 DIABETES MELLITUS WITH OTHER SPECIFIED COMPLICATION, WITH LONG-TERM CURRENT USE OF INSULIN (HCC): ICD-10-CM

## 2023-03-22 DIAGNOSIS — Z79.4 TYPE 2 DIABETES MELLITUS WITH OTHER SPECIFIED COMPLICATION, WITH LONG-TERM CURRENT USE OF INSULIN (HCC): ICD-10-CM

## 2023-03-22 DIAGNOSIS — I25.10 CORONARY ARTERY DISEASE INVOLVING NATIVE CORONARY ARTERY OF NATIVE HEART WITHOUT ANGINA PECTORIS: ICD-10-CM

## 2023-03-22 NOTE — TELEPHONE ENCOUNTER
Michelle Langston is calling to request a refill on the following medication(s):    Medication Request:  Requested Prescriptions     Pending Prescriptions Disp Refills    metFORMIN (GLUCOPHAGE) 1000 MG tablet [Pharmacy Med Name: metFORMIN HCl 1000 MG Oral Tablet] 180 tablet 0     Sig: Take 1 tablet by mouth twice daily    folic acid (FOLVITE) 1 MG tablet [Pharmacy Med Name: Folic Acid 1 MG Oral Tablet] 90 tablet 0     Sig: Take 1 tablet by mouth once daily       Last Visit Date (If Applicable):  87/85/3226    Next Visit Date:    6/19/2023

## 2023-03-23 RX ORDER — FOLIC ACID 1 MG/1
TABLET ORAL
Qty: 90 TABLET | Refills: 0 | Status: SHIPPED | OUTPATIENT
Start: 2023-03-23

## 2023-06-19 ENCOUNTER — OFFICE VISIT (OUTPATIENT)
Dept: FAMILY MEDICINE CLINIC | Age: 78
End: 2023-06-19
Payer: COMMERCIAL

## 2023-06-19 VITALS
HEART RATE: 84 BPM | DIASTOLIC BLOOD PRESSURE: 80 MMHG | OXYGEN SATURATION: 96 % | HEIGHT: 62 IN | BODY MASS INDEX: 28.71 KG/M2 | WEIGHT: 156 LBS | SYSTOLIC BLOOD PRESSURE: 128 MMHG | TEMPERATURE: 97 F

## 2023-06-19 DIAGNOSIS — I25.10 CORONARY ARTERY DISEASE INVOLVING NATIVE CORONARY ARTERY OF NATIVE HEART WITHOUT ANGINA PECTORIS: ICD-10-CM

## 2023-06-19 DIAGNOSIS — I50.22 CHRONIC SYSTOLIC CONGESTIVE HEART FAILURE (HCC): ICD-10-CM

## 2023-06-19 DIAGNOSIS — Z79.4 TYPE 2 DIABETES MELLITUS WITH OTHER SPECIFIED COMPLICATION, WITH LONG-TERM CURRENT USE OF INSULIN (HCC): ICD-10-CM

## 2023-06-19 DIAGNOSIS — E11.69 TYPE 2 DIABETES MELLITUS WITH OTHER SPECIFIED COMPLICATION, WITH LONG-TERM CURRENT USE OF INSULIN (HCC): ICD-10-CM

## 2023-06-19 DIAGNOSIS — M85.80 OSTEOPENIA, UNSPECIFIED LOCATION: ICD-10-CM

## 2023-06-19 DIAGNOSIS — J44.9 CHRONIC OBSTRUCTIVE PULMONARY DISEASE, UNSPECIFIED COPD TYPE (HCC): ICD-10-CM

## 2023-06-19 DIAGNOSIS — Z00.00 MEDICARE ANNUAL WELLNESS VISIT, SUBSEQUENT: ICD-10-CM

## 2023-06-19 LAB — HBA1C MFR BLD: 8.6 %

## 2023-06-19 PROCEDURE — 1123F ACP DISCUSS/DSCN MKR DOCD: CPT | Performed by: STUDENT IN AN ORGANIZED HEALTH CARE EDUCATION/TRAINING PROGRAM

## 2023-06-19 PROCEDURE — G0439 PPPS, SUBSEQ VISIT: HCPCS | Performed by: STUDENT IN AN ORGANIZED HEALTH CARE EDUCATION/TRAINING PROGRAM

## 2023-06-19 PROCEDURE — 83037 HB GLYCOSYLATED A1C HOME DEV: CPT | Performed by: STUDENT IN AN ORGANIZED HEALTH CARE EDUCATION/TRAINING PROGRAM

## 2023-06-19 PROCEDURE — 3052F HG A1C>EQUAL 8.0%<EQUAL 9.0%: CPT | Performed by: STUDENT IN AN ORGANIZED HEALTH CARE EDUCATION/TRAINING PROGRAM

## 2023-06-19 RX ORDER — FOLIC ACID 1 MG/1
1000 TABLET ORAL DAILY
Qty: 90 TABLET | Refills: 0 | Status: SHIPPED | OUTPATIENT
Start: 2023-06-19

## 2023-06-19 RX ORDER — INSULIN GLARGINE 300 U/ML
INJECTION, SOLUTION SUBCUTANEOUS
Qty: 300 ADJUSTABLE DOSE PRE-FILLED PEN SYRINGE | Refills: 0 | COMMUNITY
Start: 2023-06-19

## 2023-06-19 SDOH — ECONOMIC STABILITY: INCOME INSECURITY: IN THE LAST 12 MONTHS, WAS THERE A TIME WHEN YOU WERE NOT ABLE TO PAY THE MORTGAGE OR RENT ON TIME?: NO

## 2023-06-19 SDOH — ECONOMIC STABILITY: FOOD INSECURITY: WITHIN THE PAST 12 MONTHS, THE FOOD YOU BOUGHT JUST DIDN'T LAST AND YOU DIDN'T HAVE MONEY TO GET MORE.: NEVER TRUE

## 2023-06-19 SDOH — ECONOMIC STABILITY: FOOD INSECURITY: WITHIN THE PAST 12 MONTHS, YOU WORRIED THAT YOUR FOOD WOULD RUN OUT BEFORE YOU GOT MONEY TO BUY MORE.: NEVER TRUE

## 2023-06-19 SDOH — ECONOMIC STABILITY: HOUSING INSECURITY
IN THE LAST 12 MONTHS, WAS THERE A TIME WHEN YOU DID NOT HAVE A STEADY PLACE TO SLEEP OR SLEPT IN A SHELTER (INCLUDING NOW)?: NO

## 2023-06-19 ASSESSMENT — PATIENT HEALTH QUESTIONNAIRE - PHQ9
2. FEELING DOWN, DEPRESSED OR HOPELESS: 0
SUM OF ALL RESPONSES TO PHQ QUESTIONS 1-9: 0
1. LITTLE INTEREST OR PLEASURE IN DOING THINGS: 0
SUM OF ALL RESPONSES TO PHQ9 QUESTIONS 1 & 2: 0
SUM OF ALL RESPONSES TO PHQ QUESTIONS 1-9: 0

## 2023-06-19 ASSESSMENT — LIFESTYLE VARIABLES
HOW OFTEN DO YOU HAVE A DRINK CONTAINING ALCOHOL: NEVER
HOW MANY STANDARD DRINKS CONTAINING ALCOHOL DO YOU HAVE ON A TYPICAL DAY: PATIENT DOES NOT DRINK

## 2023-06-19 ASSESSMENT — SOCIAL DETERMINANTS OF HEALTH (SDOH): HOW HARD IS IT FOR YOU TO PAY FOR THE VERY BASICS LIKE FOOD, HOUSING, MEDICAL CARE, AND HEATING?: NOT HARD AT ALL

## 2023-06-19 NOTE — PATIENT INSTRUCTIONS
Learning About Being Active as an Older Adult  Why is being active important as you get older? Being active is one of the best things you can do for your health. And it's never too late to start. Being active--or getting active, if you aren't already--has definite benefits. It can:  Give you more energy,  Keep your mind sharp. Improve balance to reduce your risk of falls. Help you manage chronic illness with fewer medicines. No matter how old you are, how fit you are, or what health problems you have, there is a form of activity that will work for you. And the more physical activity you can do, the better your overall health will be. What kinds of activity can help you stay healthy? Being more active will make your daily activities easier. Physical activity includes planned exercise and things you do in daily life. There are four types of activity:  Aerobic. Doing aerobic activity makes your heart and lungs strong. Includes walking, dancing, and gardening. Aim for at least 2½ hours spread throughout the week. It improves your energy and can help you sleep better. Muscle-strengthening. This type of activity can help maintain muscle and strengthen bones. Includes climbing stairs, using resistance bands, and lifting or carrying heavy loads. Aim for at least twice a week. It can help protect the knees and other joints. Stretching. Stretching gives you better range of motion in joints and muscles. Includes upper arm stretches, calf stretches, and gentle yoga. Aim for at least twice a week, preferably after your muscles are warmed up from other activities. It can help you function better in daily life. Balancing. This helps you stay coordinated and have good posture. Includes heel-to-toe walking, kehinde chi, and certain types of yoga. Aim for at least 3 days a week. It can reduce your risk of falling.   Even if you have a hard time meeting the recommendations, it's better to be more active

## 2023-06-19 NOTE — PROGRESS NOTES
Medicare Annual Wellness Visit    Isabel Horner is here for Medicare AWV    Assessment & Plan   Medicare annual wellness visit, subsequent  Chronic systolic congestive heart failure ([facility]  -     CBC with Auto Differential; Future  -     Comprehensive Metabolic Panel, Fasting; Future  -     Lipid, Fasting; Future  Coronary artery disease involving native coronary artery of native heart without angina pectoris  -     folic acid (FOLVITE) 1 MG tablet; Take 1 tablet by mouth daily, Disp-90 tablet, R-0Normal  Type 2 diabetes mellitus with other specified complication, with long-term current use of insulin (HCC)  -     Hemoglobin A1C; Future  -     Microalbumin, Ur; Future  -     TSH with Reflex; Future  -     metFORMIN (GLUCOPHAGE) 1000 MG tablet; Take 1 tablet by mouth 2 times daily, Disp-180 tablet, R-0Normal  -     POCT glycosylated hemoglobin (Hb A1C)  -     Insulin Glargine, 2 Unit Dial, (TOUJEO MAX SOLOSTAR) 300 UNIT/ML SOPN; Sample #1  [id number]  Exp: 5/31/2024, Disp-300 Adjustable Dose Pre-filled Pen Syringe, R-0Sample  Chronic obstructive pulmonary disease, unspecified COPD type ([facility]  Osteopenia, unspecified location  -     Vitamin D 25 Hydroxy; Future  Recommendations for Preventive Services Due: see orders and patient instructions/AVS.  Recommended screening schedule for the next 5-10 years is provided to the patient in written form: see Patient Instructions/AVS.     Return in 6 months (on 12/19/2023) for Follow up labs. Subjective   The following acute and/or chronic problems were also addressed today:  She states that her blood sugars continue to fluctuate and she has been taking Toujeo and metformin. She had an echocardiogram done recently by her cardiologist at [facility] and her EF was down to 15%. She was advised defibrillator but she refused. She states that she has been doing well and does not have any shortness of breath or chest pains. She is also due for her labs.   Declined DEXA

## 2023-06-23 DIAGNOSIS — F41.9 ANXIETY: ICD-10-CM

## 2023-06-23 RX ORDER — LORAZEPAM 0.5 MG/1
0.5 TABLET ORAL DAILY PRN
Qty: 10 TABLET | Refills: 0 | Status: SHIPPED | OUTPATIENT
Start: 2023-06-23 | End: 2023-07-03

## 2023-06-23 NOTE — TELEPHONE ENCOUNTER
Jose Antonio Chirinos is calling to request a refill on the following medication(s):    Medication Request:  Requested Prescriptions     Pending Prescriptions Disp Refills    LORazepam (ATIVAN) 0.5 MG tablet [Pharmacy Med Name: LORazepam 0.5 MG Oral Tablet] 30 tablet 0     Sig: TAKE 1 TABLET BY MOUTH ONCE DAILY AS NEEDED FOR ANXIETY FOR 30 DAYS       Last Visit Date (If Applicable):  2/84/9569    Next Visit Date:    12/20/2023

## 2023-06-28 ENCOUNTER — TELEPHONE (OUTPATIENT)
Dept: PHARMACY | Facility: CLINIC | Age: 78
End: 2023-06-28

## 2023-07-03 RX ORDER — ROSUVASTATIN CALCIUM 40 MG/1
40 TABLET, COATED ORAL DAILY
COMMUNITY

## 2023-07-10 NOTE — TELEPHONE ENCOUNTER
Per [place] portal paid rosuvastatin 40 mg claim 7/5/23 x 90 day supply at [id number] [facility], 3RR.     For Pharmacy Admin Tracking Only    Program: [place] in place:  No  Recommendation Provided To: Patient/Caregiver: 1 via Telephone and Pharmacy: 1  Intervention Detail: Adherence Monitorin and Refill(s) Provided  Intervention Accepted By: Patient/Caregiver: 1 and Pharmacy: 1  Gap Closed?: Yes   Time Spent (min): 30

## 2023-07-24 RX ORDER — ROSUVASTATIN CALCIUM 40 MG/1
40 TABLET, COATED ORAL DAILY
Qty: 90 TABLET | Refills: 3 | Status: SHIPPED | OUTPATIENT
Start: 2023-07-24

## 2023-09-28 DIAGNOSIS — Z79.4 TYPE 2 DIABETES MELLITUS WITH OTHER SPECIFIED COMPLICATION, WITH LONG-TERM CURRENT USE OF INSULIN (HCC): ICD-10-CM

## 2023-09-28 DIAGNOSIS — I50.22 CHRONIC SYSTOLIC CONGESTIVE HEART FAILURE (HCC): ICD-10-CM

## 2023-09-28 DIAGNOSIS — M85.80 OSTEOPENIA, UNSPECIFIED LOCATION: ICD-10-CM

## 2023-09-28 DIAGNOSIS — E11.69 TYPE 2 DIABETES MELLITUS WITH OTHER SPECIFIED COMPLICATION, WITH LONG-TERM CURRENT USE OF INSULIN (HCC): ICD-10-CM

## 2023-09-28 LAB
ALBUMIN SERPL-MCNC: 4.2 G/DL
ALP BLD-CCNC: 46 U/L
ALT SERPL-CCNC: 17 U/L
AST SERPL-CCNC: 17 U/L
AVERAGE GLUCOSE: 183
BASOPHILS ABSOLUTE: 0.1 /ΜL
BASOPHILS RELATIVE PERCENT: 1 %
BILIRUB SERPL-MCNC: 0.4 MG/DL (ref 0.1–1.4)
BUN BLDV-MCNC: 15 MG/DL
CALCIUM SERPL-MCNC: 9 MG/DL
CHLORIDE BLD-SCNC: 107 MMOL/L
CHOLESTEROL, FASTING: 100
CO2: 19 MMOL/L
CREAT SERPL-MCNC: 0.91 MG/DL
EOSINOPHILS ABSOLUTE: 0.1 /ΜL
EOSINOPHILS RELATIVE PERCENT: 1 %
GLUCOSE FASTING: 148 MG/DL
HBA1C MFR BLD: 8 %
HCT VFR BLD CALC: 37.8 % (ref 36–46)
HDLC SERPL-MCNC: 30 MG/DL (ref 35–70)
HEMOGLOBIN: 12.5 G/DL (ref 12–16)
LDL CHOLESTEROL CALCULATED: 31 MG/DL (ref 0–160)
LYMPHOCYTES ABSOLUTE: 2.8 /ΜL
LYMPHOCYTES RELATIVE PERCENT: 39 %
MCH RBC QN AUTO: 31.4 PG
MCHC RBC AUTO-ENTMCNC: 33.1 G/DL
MCV RBC AUTO: 95 FL
MONOCYTES ABSOLUTE: 0.4 /ΜL
MONOCYTES RELATIVE PERCENT: 6 %
NEUTROPHILS ABSOLUTE: 3.8 /ΜL
NEUTROPHILS RELATIVE PERCENT: 53 %
PDW BLD-RTO: 13.6 %
PLATELET # BLD: 198 K/ΜL
PMV BLD AUTO: 10.4 FL
POTASSIUM SERPL-SCNC: 4.4 MMOL/L
RBC # BLD: 3.98 10^6/ΜL
SODIUM BLD-SCNC: 138 MMOL/L
TOTAL PROTEIN: 6.4 G/DL (ref 6.4–8.2)
TRIGLYCERIDE, FASTING: 193
TSH SERPL DL<=0.05 MIU/L-ACNC: 1.19 UIU/ML
VITAMIN D 25-HYDROXY: 51
VITAMIN D2, 25 HYDROXY: NORMAL
VITAMIN D3,25 HYDROXY: NORMAL
WBC # BLD: 7.3 10^3/ML

## 2023-10-05 ENCOUNTER — TELEPHONE (OUTPATIENT)
Dept: PHARMACY | Facility: CLINIC | Age: 78
End: 2023-10-05

## 2023-10-05 DIAGNOSIS — E11.69 TYPE 2 DIABETES MELLITUS WITH OTHER SPECIFIED COMPLICATION, WITH LONG-TERM CURRENT USE OF INSULIN (HCC): ICD-10-CM

## 2023-10-05 DIAGNOSIS — Z79.4 TYPE 2 DIABETES MELLITUS WITH OTHER SPECIFIED COMPLICATION, WITH LONG-TERM CURRENT USE OF INSULIN (HCC): ICD-10-CM

## 2023-10-05 RX ORDER — LISINOPRIL 5 MG/1
TABLET ORAL
Qty: 90 TABLET | Refills: 1 | Status: SHIPPED | OUTPATIENT
Start: 2023-10-05

## 2023-10-05 NOTE — TELEPHONE ENCOUNTER
[facility] CLINICAL PHARMACY: ADHERENCE REVIEW  Identified care gap per [place]: fills at [facility]: ACE/ARB and Statin adherence    Patient also appears to be prescribed: ACE/ARB and Statin    ASSESSMENT    ACE/ARB ADHERENCE    Insurance Records claims through 23 (Prior Year [address] = not reported; [address] = 92%; Potential Fail Date: 23):   LISINOPRIL 5 MG TABLET last filled on 23 for 90 day supply. Next refill due: 23    Prescribed si tablet/capsule daily    Per Reconcile Dispense History: last filled on 23 for 90 day supply. Per [facility]: last picked up on 23 for 90 day supply. 0 refills remaining. Pharmacy will send a refill request     BP Readings from Last 3 Encounters:   23 128/80   12/15/22 130/78   03/10/22 130/78     Estimated Creatinine Clearance: 47 mL/min (based on SCr of 0.91 mg/dL). Lab Results   Component Value Date    CREATININE 0.91 2023     Lab Results   Component Value Date    K 4.4 2023     STATIN ADHERENCE    Insurance Records claims through 23 (Prior Year [address] = not reported; [address] = 75%; Potential Fail Date: 10/13/23):   ROSUVASTATIN CALCIUM 40 MG TAB last filled on 23 for 90 day supply. Next refill due: 10/03/23    Prescribed si tablet/capsule daily    Per Reconcile Dispense History: last filled on 23 for 90 day supply. Per [facility]: last picked up on 23 for 90 day supply. Lab Results   Component Value Date    CHOL 132 2022    TRIG 286 (H) 2022    HDL 30 (A) 2023    LDLCHOLESTEROL 45 2022    LDLCALC 31 2023     ALT   Date Value Ref Range Status   2023 17 U/L Final     AST   Date Value Ref Range Status   2023 17 U/L Final     The ASCVD Risk score (Divya DK, et al., 2019) failed to calculate for the following reasons:     The valid total cholesterol range is 130 to 320 mg/dL     PLAN  The following are interventions that have been

## 2023-10-05 NOTE — TELEPHONE ENCOUNTER
Ramón Villela is calling to request a refill on the following medication(s):    Medication Request:  Requested Prescriptions     Pending Prescriptions Disp Refills    lisinopril (PRINIVIL;ZESTRIL) 5 MG tablet [Pharmacy Med Name: Lisinopril 5 MG Oral Tablet] 90 tablet 0     Sig: Take 1 tablet by mouth once daily       Last Visit Date (If Applicable):  0/01/6288    Next Visit Date:    12/20/2023

## 2023-10-27 DIAGNOSIS — E11.69 TYPE 2 DIABETES MELLITUS WITH OTHER SPECIFIED COMPLICATION, WITH LONG-TERM CURRENT USE OF INSULIN (HCC): ICD-10-CM

## 2023-10-27 DIAGNOSIS — Z79.4 TYPE 2 DIABETES MELLITUS WITHOUT COMPLICATION, WITH LONG-TERM CURRENT USE OF INSULIN (HCC): ICD-10-CM

## 2023-10-27 DIAGNOSIS — E11.9 TYPE 2 DIABETES MELLITUS WITHOUT COMPLICATION, WITH LONG-TERM CURRENT USE OF INSULIN (HCC): ICD-10-CM

## 2023-10-27 DIAGNOSIS — Z79.4 TYPE 2 DIABETES MELLITUS WITH OTHER SPECIFIED COMPLICATION, WITH LONG-TERM CURRENT USE OF INSULIN (HCC): ICD-10-CM

## 2023-10-27 NOTE — TELEPHONE ENCOUNTER
Patient is requesting (B-D ULTRAFINE III SHORT PEN) 31G X 8 MM needles , she last had them around 2016 and stated she liked them better.

## 2023-10-27 NOTE — TELEPHONE ENCOUNTER
Shyann Hogue is calling to request a refill on the following medication(s):    Medication Request:  Requested Prescriptions     Pending Prescriptions Disp Refills    metFORMIN (GLUCOPHAGE) 1000 MG tablet [Pharmacy Med Name: metFORMIN HCl 1000 MG Oral Tablet] 180 tablet 0     Sig: Take 1 tablet by mouth twice daily       Last Visit Date (If Applicable):  1/14/1406    Next Visit Date:    12/20/2023

## 2023-12-20 DIAGNOSIS — M15.9 PRIMARY OSTEOARTHRITIS INVOLVING MULTIPLE JOINTS: ICD-10-CM

## 2024-01-04 DIAGNOSIS — I25.10 CORONARY ARTERY DISEASE INVOLVING NATIVE CORONARY ARTERY OF NATIVE HEART WITHOUT ANGINA PECTORIS: ICD-10-CM

## 2024-01-04 RX ORDER — FOLIC ACID 1 MG/1
1000 TABLET ORAL DAILY
Qty: 90 TABLET | Refills: 1 | Status: SHIPPED | OUTPATIENT
Start: 2024-01-04

## 2024-01-04 NOTE — TELEPHONE ENCOUNTER
Daksha Smith is calling to request a refill on the following medication(s):    Medication Request:  Requested Prescriptions     Pending Prescriptions Disp Refills    folic acid (FOLVITE) 1 MG tablet [Pharmacy Med Name: Folic Acid 1 MG Oral Tablet] 90 tablet 0     Sig: Take 1 tablet by mouth once daily       Last Visit Date (If Applicable):  12/20/2023    Next Visit Date:    6/24/2024

## 2024-03-25 ENCOUNTER — TELEPHONE (OUTPATIENT)
Dept: FAMILY MEDICINE CLINIC | Age: 79
End: 2024-03-25

## 2024-03-25 DIAGNOSIS — E11.69 TYPE 2 DIABETES MELLITUS WITH OTHER SPECIFIED COMPLICATION, WITH LONG-TERM CURRENT USE OF INSULIN (HCC): ICD-10-CM

## 2024-03-25 DIAGNOSIS — Z79.4 TYPE 2 DIABETES MELLITUS WITH OTHER SPECIFIED COMPLICATION, WITH LONG-TERM CURRENT USE OF INSULIN (HCC): ICD-10-CM

## 2024-03-25 RX ORDER — INSULIN GLARGINE 300 U/ML
26 INJECTION, SOLUTION SUBCUTANEOUS DAILY
Qty: 3 ADJUSTABLE DOSE PRE-FILLED PEN SYRINGE | Refills: 1 | Status: SHIPPED | OUTPATIENT
Start: 2024-03-25

## 2024-03-25 NOTE — TELEPHONE ENCOUNTER
Patients pharmacy called and stated the patients TOUJEO MAX SOLOSTAR only comes in boxes of four not three and they can't break a box. They changed it to 4 Adjustable Dose Pre-filled.

## 2024-03-25 NOTE — TELEPHONE ENCOUNTER
Daksha Smith is calling to request a refill on the following medication(s):    Medication Request:  Requested Prescriptions     Pending Prescriptions Disp Refills    Insulin Glargine, 2 Unit Dial, (TOUJEO MAX SOLOSTAR) 300 UNIT/ML SOPN 300 Adjustable Dose Pre-filled Pen Syringe 0     Sig: Sample #1  Lot:[id number]  Exp: 5/31/2024       Last Visit Date (If Applicable):  12/20/2023    Next Visit Date:    6/24/2024

## 2024-05-06 ENCOUNTER — TELEPHONE (OUTPATIENT)
Dept: FAMILY MEDICINE CLINIC | Age: 79
End: 2024-05-06

## 2024-05-06 NOTE — TELEPHONE ENCOUNTER
----- Message from Na Osorio sent at 5/6/2024 10:18 AM EDT -----  Subject: Message to Provider    QUESTIONS  Information for Provider? Pt  Victor Manuel is requesting to have her lab   orders mailed to their home address. She misplaced the lab orders. Please   contact pt or her  Victor Manuel.   ---------------------------------------------------------------------------  --------------  CALL BACK INFO  [phone]; Do not leave any message, patient will call back for answer  ---------------------------------------------------------------------------  --------------  SCRIPT ANSWERS  Relationship to Patient? Self

## 2024-06-04 LAB
ALBUMIN SERPL-MCNC: 4.2 G/DL
ALP BLD-CCNC: 59 U/L
ALT SERPL-CCNC: 8 U/L
AST SERPL-CCNC: 11 U/L
BASOPHILS ABSOLUTE: 0.1 /ΜL
BASOPHILS RELATIVE PERCENT: 1 %
BILIRUB SERPL-MCNC: 0.4 MG/DL (ref 0.1–1.4)
BUN BLDV-MCNC: 13 MG/DL
CALCIUM SERPL-MCNC: 9.4 MG/DL
CHLORIDE BLD-SCNC: 105 MMOL/L
CHOLESTEROL, FASTING: 113
CO2: 22 MMOL/L
CREAT SERPL-MCNC: 0.96 MG/DL
EOSINOPHILS ABSOLUTE: 0.2 /ΜL
EOSINOPHILS RELATIVE PERCENT: 2 %
ESTIMATED AVERAGE GLUCOSE: 203
GLUCOSE FASTING: 206 MG/DL
HBA1C MFR BLD: 8.7 %
HCT VFR BLD CALC: 38.5 % (ref 36–46)
HDLC SERPL-MCNC: 31 MG/DL (ref 35–70)
HEMOGLOBIN: 12.4 G/DL (ref 12–16)
LDL CHOLESTEROL CALCULATED: 49 MG/DL (ref 0–160)
LYMPHOCYTES ABSOLUTE: 2.9 /ΜL
LYMPHOCYTES RELATIVE PERCENT: 37 %
MCH RBC QN AUTO: 30.5 PG
MCV RBC AUTO: 94.6 FL
MONOCYTES ABSOLUTE: 0.5 /ΜL
MONOCYTES RELATIVE PERCENT: 7 %
NEUTROPHILS ABSOLUTE: 4.1 /ΜL
NEUTROPHILS RELATIVE PERCENT: 53 %
PDW BLD-RTO: 13.5 %
PLATELET # BLD: 222 K/ΜL
PMV BLD AUTO: 9.7 FL
POTASSIUM SERPL-SCNC: 4.5 MMOL/L
RBC # BLD: 4.07 10^6/ΜL
SODIUM BLD-SCNC: 137 MMOL/L
TOTAL PROTEIN: 6.9 G/DL (ref 6.4–8.2)
TRIGLYCERIDE, FASTING: 163
TSH SERPL DL<=0.05 MIU/L-ACNC: 1.43 UIU/ML
WBC # BLD: 7.7 10^3/ML

## 2024-06-05 DIAGNOSIS — Z79.4 TYPE 2 DIABETES MELLITUS WITH OTHER SPECIFIED COMPLICATION, WITH LONG-TERM CURRENT USE OF INSULIN (HCC): ICD-10-CM

## 2024-06-05 DIAGNOSIS — I25.10 CORONARY ARTERY DISEASE INVOLVING NATIVE CORONARY ARTERY OF NATIVE HEART WITHOUT ANGINA PECTORIS: ICD-10-CM

## 2024-06-05 DIAGNOSIS — I34.0 NONRHEUMATIC MITRAL VALVE REGURGITATION: ICD-10-CM

## 2024-06-05 DIAGNOSIS — I25.5 ISCHEMIC CARDIOMYOPATHY: ICD-10-CM

## 2024-06-05 DIAGNOSIS — E11.69 TYPE 2 DIABETES MELLITUS WITH OTHER SPECIFIED COMPLICATION, WITH LONG-TERM CURRENT USE OF INSULIN (HCC): ICD-10-CM

## 2024-06-05 DIAGNOSIS — I51.9 LEFT VENTRICULAR DYSFUNCTION: ICD-10-CM

## 2024-06-24 ENCOUNTER — OFFICE VISIT (OUTPATIENT)
Dept: FAMILY MEDICINE CLINIC | Age: 79
End: 2024-06-24
Payer: COMMERCIAL

## 2024-06-24 VITALS
SYSTOLIC BLOOD PRESSURE: 100 MMHG | DIASTOLIC BLOOD PRESSURE: 70 MMHG | TEMPERATURE: 97 F | HEART RATE: 57 BPM | HEIGHT: 62 IN | WEIGHT: 146 LBS | BODY MASS INDEX: 26.87 KG/M2 | OXYGEN SATURATION: 90 %

## 2024-06-24 DIAGNOSIS — R05.8 DRY COUGH: ICD-10-CM

## 2024-06-24 DIAGNOSIS — Z00.00 MEDICARE ANNUAL WELLNESS VISIT, SUBSEQUENT: Primary | ICD-10-CM

## 2024-06-24 DIAGNOSIS — J30.2 SEASONAL ALLERGIC RHINITIS, UNSPECIFIED TRIGGER: ICD-10-CM

## 2024-06-24 DIAGNOSIS — J44.9 CHRONIC OBSTRUCTIVE PULMONARY DISEASE, UNSPECIFIED COPD TYPE (HCC): ICD-10-CM

## 2024-06-24 PROCEDURE — G0439 PPPS, SUBSEQ VISIT: HCPCS | Performed by: STUDENT IN AN ORGANIZED HEALTH CARE EDUCATION/TRAINING PROGRAM

## 2024-06-24 PROCEDURE — 1123F ACP DISCUSS/DSCN MKR DOCD: CPT | Performed by: STUDENT IN AN ORGANIZED HEALTH CARE EDUCATION/TRAINING PROGRAM

## 2024-06-24 RX ORDER — LORATADINE 10 MG/1
10 TABLET ORAL DAILY
Qty: 30 TABLET | Refills: 0 | Status: SHIPPED | OUTPATIENT
Start: 2024-06-24

## 2024-06-24 RX ORDER — BENZONATATE 100 MG/1
100 CAPSULE ORAL 3 TIMES DAILY PRN
Qty: 30 CAPSULE | Refills: 0 | Status: SHIPPED | OUTPATIENT
Start: 2024-06-24 | End: 2024-07-04

## 2024-06-24 SDOH — ECONOMIC STABILITY: FOOD INSECURITY: WITHIN THE PAST 12 MONTHS, YOU WORRIED THAT YOUR FOOD WOULD RUN OUT BEFORE YOU GOT MONEY TO BUY MORE.: NEVER TRUE

## 2024-06-24 SDOH — ECONOMIC STABILITY: FOOD INSECURITY: WITHIN THE PAST 12 MONTHS, THE FOOD YOU BOUGHT JUST DIDN'T LAST AND YOU DIDN'T HAVE MONEY TO GET MORE.: NEVER TRUE

## 2024-06-24 SDOH — ECONOMIC STABILITY: INCOME INSECURITY: IN THE LAST 12 MONTHS, WAS THERE A TIME WHEN YOU WERE NOT ABLE TO PAY THE MORTGAGE OR RENT ON TIME?: NO

## 2024-06-24 ASSESSMENT — PATIENT HEALTH QUESTIONNAIRE - PHQ9
SUM OF ALL RESPONSES TO PHQ QUESTIONS 1-9: 0
1. LITTLE INTEREST OR PLEASURE IN DOING THINGS: NOT AT ALL
SUM OF ALL RESPONSES TO PHQ QUESTIONS 1-9: 0
SUM OF ALL RESPONSES TO PHQ QUESTIONS 1-9: 0
SUM OF ALL RESPONSES TO PHQ9 QUESTIONS 1 & 2: 0
2. FEELING DOWN, DEPRESSED OR HOPELESS: NOT AT ALL
SUM OF ALL RESPONSES TO PHQ QUESTIONS 1-9: 0

## 2024-06-24 ASSESSMENT — SOCIAL DETERMINANTS OF HEALTH (SDOH): HOW HARD IS IT FOR YOU TO PAY FOR THE VERY BASICS LIKE FOOD, HOUSING, MEDICAL CARE, AND HEATING?: NOT HARD AT ALL

## 2024-06-24 NOTE — PROGRESS NOTES
Medicare Annual Wellness Visit    Daksha Smith is here for Medicare AWV    Assessment & Plan   Medicare annual wellness visit, subsequent  Chronic obstructive pulmonary disease, unspecified COPD type (HCC)  Seasonal allergic rhinitis, unspecified trigger  -     loratadine (CLARITIN) 10 MG tablet; Take 1 tablet by mouth daily, Disp-30 tablet, R-0Normal  Dry cough  -     benzonatate (TESSALON) 100 MG capsule; Take 1 capsule by mouth 3 times daily as needed for Cough, Disp-30 capsule, R-0Normal  Recommendations for Preventive Services Due: see orders and patient instructions/AVS.  Recommended screening schedule for the next 5-10 years is provided to the patient in written form: see Patient Instructions/AVS.     Return in 6 months (on 12/24/2024) for Follow up DM/HTN.     Subjective   The following acute and/or chronic problems were also addressed today:  She complains of having persistent cough for past 4 years, states that she gets into bouts of cough sometimes followed by sneezing.  States that it started after she had COVID 4 years ago.  She also has congestive heart failure and is supposed to repeat her echocardiogram.  She states that her cardiologist mentioned surgery to repair her wall but she is not interested in any surgery.    Patient's complete Health Risk Assessment and screening values have been reviewed and are found in Flowsheets. The following problems were reviewed today and where indicated follow up appointments were made and/or referrals ordered.    Positive Risk Factor Screenings with Interventions:                Activity, Diet, and Weight:  On average, how many days per week do you engage in moderate to strenuous exercise (like a brisk walk)?: 0 days  On average, how many minutes do you engage in exercise at this level?: 0 min    Do you eat balanced/healthy meals regularly?: (!) No    Body mass index is 26.7 kg/m².      Inactivity Interventions:  Patient declined any further interventions or

## 2024-06-27 DIAGNOSIS — I25.10 CORONARY ARTERY DISEASE INVOLVING NATIVE CORONARY ARTERY OF NATIVE HEART WITHOUT ANGINA PECTORIS: ICD-10-CM

## 2024-06-28 NOTE — TELEPHONE ENCOUNTER
Daksha Smith is calling to request a refill on the following medication(s):    Medication Request:  Requested Prescriptions     Pending Prescriptions Disp Refills    folic acid (FOLVITE) 1 MG tablet [Pharmacy Med Name: Folic Acid 1 MG Oral Tablet] 90 tablet 0     Sig: Take 1 tablet by mouth once daily       Last Visit Date (If Applicable):  6/24/2024    Next Visit Date:    12/19/2024

## 2024-07-01 RX ORDER — FOLIC ACID 1 MG/1
1000 TABLET ORAL DAILY
Qty: 90 TABLET | Refills: 1 | Status: SHIPPED | OUTPATIENT
Start: 2024-07-01

## 2024-07-30 LAB
LEFT VENTRICULAR EJECTION FRACTION HIGH VALUE: 40 %
LEFT VENTRICULAR EJECTION FRACTION MODE: NORMAL
LV EF: 35 %

## 2024-08-07 DIAGNOSIS — Z79.4 TYPE 2 DIABETES MELLITUS WITH OTHER SPECIFIED COMPLICATION, WITH LONG-TERM CURRENT USE OF INSULIN (HCC): ICD-10-CM

## 2024-08-07 DIAGNOSIS — E11.69 TYPE 2 DIABETES MELLITUS WITH OTHER SPECIFIED COMPLICATION, WITH LONG-TERM CURRENT USE OF INSULIN (HCC): ICD-10-CM

## 2024-08-08 RX ORDER — PEN NEEDLE, DIABETIC 31 GX5/16"
NEEDLE, DISPOSABLE MISCELLANEOUS
Qty: 100 EACH | Refills: 1 | Status: SHIPPED | OUTPATIENT
Start: 2024-08-08

## 2024-08-08 NOTE — TELEPHONE ENCOUNTER
Daksha Smith is calling to request a refill on the following medication(s):    Medication Request:  Requested Prescriptions     Pending Prescriptions Disp Refills    Insulin Pen Needle (B-D ULTRAFINE III SHORT PEN) 31G X 8 MM MISC [Pharmacy Med Name: BD UF III SHORT PEN NEED MIS] 100 each 0     Sig: USE AS DIRECTED       Last Visit Date (If Applicable):  6/24/2024    Next Visit Date:    12/19/2024

## 2024-08-12 DIAGNOSIS — Z79.4 TYPE 2 DIABETES MELLITUS WITH OTHER SPECIFIED COMPLICATION, WITH LONG-TERM CURRENT USE OF INSULIN (HCC): ICD-10-CM

## 2024-08-12 DIAGNOSIS — E11.69 TYPE 2 DIABETES MELLITUS WITH OTHER SPECIFIED COMPLICATION, WITH LONG-TERM CURRENT USE OF INSULIN (HCC): ICD-10-CM

## 2024-08-12 RX ORDER — INSULIN GLARGINE 300 U/ML
26 INJECTION, SOLUTION SUBCUTANEOUS DAILY
Qty: 3 ADJUSTABLE DOSE PRE-FILLED PEN SYRINGE | Refills: 1 | Status: SHIPPED | OUTPATIENT
Start: 2024-08-12

## 2024-08-12 NOTE — TELEPHONE ENCOUNTER
Daksha Smith is calling to request a refill on the following medication(s):    Medication Request:  Requested Prescriptions     Pending Prescriptions Disp Refills    Insulin Glargine, 2 Unit Dial, (TOUJEO MAX SOLOSTAR) 300 UNIT/ML SOPN 3 Adjustable Dose Pre-filled Pen Syringe 1     Sig: Inject 26 Units into the skin daily       Last Visit Date (If Applicable):  6/24/2024    Next Visit Date:    12/19/2024

## 2024-10-29 DIAGNOSIS — E11.69 TYPE 2 DIABETES MELLITUS WITH OTHER SPECIFIED COMPLICATION, WITH LONG-TERM CURRENT USE OF INSULIN (HCC): ICD-10-CM

## 2024-10-29 DIAGNOSIS — Z79.4 TYPE 2 DIABETES MELLITUS WITH OTHER SPECIFIED COMPLICATION, WITH LONG-TERM CURRENT USE OF INSULIN (HCC): ICD-10-CM

## 2024-12-19 ENCOUNTER — OFFICE VISIT (OUTPATIENT)
Dept: FAMILY MEDICINE CLINIC | Age: 79
End: 2024-12-19
Payer: MEDICARE

## 2024-12-19 VITALS
OXYGEN SATURATION: 96 % | HEIGHT: 60 IN | WEIGHT: 146 LBS | SYSTOLIC BLOOD PRESSURE: 116 MMHG | HEART RATE: 76 BPM | BODY MASS INDEX: 28.66 KG/M2 | TEMPERATURE: 97 F | DIASTOLIC BLOOD PRESSURE: 72 MMHG

## 2024-12-19 DIAGNOSIS — I25.10 CORONARY ARTERY DISEASE INVOLVING NATIVE CORONARY ARTERY OF NATIVE HEART WITHOUT ANGINA PECTORIS: ICD-10-CM

## 2024-12-19 DIAGNOSIS — E11.69 TYPE 2 DIABETES MELLITUS WITH OTHER SPECIFIED COMPLICATION, WITH LONG-TERM CURRENT USE OF INSULIN (HCC): Primary | ICD-10-CM

## 2024-12-19 DIAGNOSIS — Z79.4 TYPE 2 DIABETES MELLITUS WITH OTHER SPECIFIED COMPLICATION, WITH LONG-TERM CURRENT USE OF INSULIN (HCC): Primary | ICD-10-CM

## 2024-12-19 DIAGNOSIS — F41.9 ANXIETY: ICD-10-CM

## 2024-12-19 DIAGNOSIS — M85.80 OSTEOPENIA, UNSPECIFIED LOCATION: ICD-10-CM

## 2024-12-19 PROCEDURE — 3052F HG A1C>EQUAL 8.0%<EQUAL 9.0%: CPT | Performed by: STUDENT IN AN ORGANIZED HEALTH CARE EDUCATION/TRAINING PROGRAM

## 2024-12-19 PROCEDURE — 1159F MED LIST DOCD IN RCRD: CPT | Performed by: STUDENT IN AN ORGANIZED HEALTH CARE EDUCATION/TRAINING PROGRAM

## 2024-12-19 PROCEDURE — 1123F ACP DISCUSS/DSCN MKR DOCD: CPT | Performed by: STUDENT IN AN ORGANIZED HEALTH CARE EDUCATION/TRAINING PROGRAM

## 2024-12-19 PROCEDURE — 1160F RVW MEDS BY RX/DR IN RCRD: CPT | Performed by: STUDENT IN AN ORGANIZED HEALTH CARE EDUCATION/TRAINING PROGRAM

## 2024-12-19 PROCEDURE — 99214 OFFICE O/P EST MOD 30 MIN: CPT | Performed by: STUDENT IN AN ORGANIZED HEALTH CARE EDUCATION/TRAINING PROGRAM

## 2024-12-19 RX ORDER — FOLIC ACID 1 MG/1
1000 TABLET ORAL DAILY
Qty: 90 TABLET | Refills: 1 | Status: SHIPPED | OUTPATIENT
Start: 2024-12-19

## 2024-12-19 RX ORDER — LISINOPRIL 5 MG/1
5 TABLET ORAL DAILY
Qty: 90 TABLET | Refills: 1 | Status: SHIPPED | OUTPATIENT
Start: 2024-12-19

## 2024-12-19 RX ORDER — INSULIN GLARGINE 300 U/ML
26 INJECTION, SOLUTION SUBCUTANEOUS DAILY
Qty: 3 ADJUSTABLE DOSE PRE-FILLED PEN SYRINGE | Refills: 1 | Status: SHIPPED | OUTPATIENT
Start: 2024-12-19

## 2024-12-19 RX ORDER — LORAZEPAM 0.5 MG/1
0.5 TABLET ORAL EVERY 8 HOURS PRN
Qty: 18 TABLET | Refills: 1 | Status: SHIPPED | OUTPATIENT
Start: 2024-12-19 | End: 2025-01-18

## 2024-12-19 SDOH — ECONOMIC STABILITY: FOOD INSECURITY: WITHIN THE PAST 12 MONTHS, THE FOOD YOU BOUGHT JUST DIDN'T LAST AND YOU DIDN'T HAVE MONEY TO GET MORE.: NEVER TRUE

## 2024-12-19 SDOH — ECONOMIC STABILITY: FOOD INSECURITY: WITHIN THE PAST 12 MONTHS, YOU WORRIED THAT YOUR FOOD WOULD RUN OUT BEFORE YOU GOT MONEY TO BUY MORE.: NEVER TRUE

## 2024-12-19 SDOH — ECONOMIC STABILITY: INCOME INSECURITY: IN THE LAST 12 MONTHS, WAS THERE A TIME WHEN YOU WERE NOT ABLE TO PAY THE MORTGAGE OR RENT ON TIME?: NO

## 2024-12-19 ASSESSMENT — ENCOUNTER SYMPTOMS
DIARRHEA: 0
CHEST TIGHTNESS: 0
VOMITING: 0
NAUSEA: 0
CONSTIPATION: 0
WHEEZING: 0
EYE DISCHARGE: 0
ABDOMINAL PAIN: 0
COUGH: 0
SORE THROAT: 0
SHORTNESS OF BREATH: 0

## 2024-12-19 ASSESSMENT — PATIENT HEALTH QUESTIONNAIRE - PHQ9
2. FEELING DOWN, DEPRESSED OR HOPELESS: NOT AT ALL
SUM OF ALL RESPONSES TO PHQ QUESTIONS 1-9: 0
SUM OF ALL RESPONSES TO PHQ QUESTIONS 1-9: 0
SUM OF ALL RESPONSES TO PHQ9 QUESTIONS 1 & 2: 0
SUM OF ALL RESPONSES TO PHQ QUESTIONS 1-9: 0
1. LITTLE INTEREST OR PLEASURE IN DOING THINGS: NOT AT ALL
SUM OF ALL RESPONSES TO PHQ QUESTIONS 1-9: 0

## 2024-12-19 ASSESSMENT — SOCIAL DETERMINANTS OF HEALTH (SDOH): HOW HARD IS IT FOR YOU TO PAY FOR THE VERY BASICS LIKE FOOD, HOUSING, MEDICAL CARE, AND HEATING?: NOT HARD AT ALL

## 2024-12-19 NOTE — PROGRESS NOTES
or tenderness. Normal range of motion.      Cervical back: Normal range of motion and neck supple.   Skin:     General: Skin is warm and dry.      Coloration: Skin is not jaundiced.      Findings: No rash.   Neurological:      General: No focal deficit present.      Mental Status: She is alert and oriented to person, place, and time.   Psychiatric:         Mood and Affect: Mood normal.         Behavior: Behavior normal.         Thought Content: Thought content normal.         Judgment: Judgment normal.       /72   Pulse 76   Temp 97 °F (36.1 °C)   Ht 1.524 m (5')   Wt 66.2 kg (146 lb)   SpO2 96%   BMI 28.51 kg/m²     Assessment/Plan:   1. Type 2 diabetes mellitus with other specified complication, with long-term current use of insulin ([facility])  -     Lipid, Fasting; Future  -     Hemoglobin A1C; Future  -     TSH reflex to FT4; Future  -     Albumin/Creatinine Ratio, Urine; Future  -     metFORMIN (GLUCOPHAGE) 1000 MG tablet; Take 1 tablet by mouth 2 times daily, Disp-180 tablet, R-1Normal  -     Insulin Glargine, 2 Unit Dial, (TOUJEO MAX SOLOSTAR) 300 UNIT/ML concentrated injection pen; Inject 26 Units into the skin daily, Disp-3 Adjustable Dose Pre-filled Pen Syringe, R-1Normal  -     lisinopril (PRINIVIL;ZESTRIL) 5 MG tablet; Take 1 tablet by mouth daily, Disp-90 tablet, R-1Normal  2. Coronary artery disease involving native coronary artery of native heart without angina pectoris  -     CBC with Auto Differential; Future  -     Comprehensive Metabolic Panel, Fasting; Future  -     folic acid (FOLVITE) 1 MG tablet; Take 1 tablet by mouth daily, Disp-90 tablet, R-1Normal  3. Anxiety  -     LORazepam (ATIVAN) 0.5 MG tablet; Take 1 tablet by mouth every 8 hours as needed for Anxiety for up to 30 days. Max Daily Amount: 1.5 mg, Disp-18 tablet, R-1Normal  4. Osteopenia, unspecified location  -     Vitamin D 25 Hydroxy; Future        Type 2 diabetes-last A1c 8.7, on Toujeo 28 units daily and metformin 1000 mg

## 2025-03-05 DIAGNOSIS — E11.69 TYPE 2 DIABETES MELLITUS WITH OTHER SPECIFIED COMPLICATION, WITH LONG-TERM CURRENT USE OF INSULIN (HCC): ICD-10-CM

## 2025-03-05 DIAGNOSIS — Z79.4 TYPE 2 DIABETES MELLITUS WITH OTHER SPECIFIED COMPLICATION, WITH LONG-TERM CURRENT USE OF INSULIN (HCC): ICD-10-CM

## 2025-03-05 RX ORDER — PEN NEEDLE, DIABETIC 31 GX5/16"
NEEDLE, DISPOSABLE MISCELLANEOUS
Qty: 100 EACH | Refills: 0 | Status: SHIPPED | OUTPATIENT
Start: 2025-03-05

## 2025-03-05 NOTE — TELEPHONE ENCOUNTER
Daksha Smith is calling to request a refill on the following medication(s):    Medication Request:  Requested Prescriptions     Pending Prescriptions Disp Refills    B-D ULTRAFINE III SHORT PEN 31G X 8 MM MISC [Pharmacy Med Name: BD UF SHRT PEN NDL [id number] MIS] 100 each 0     Sig: USE AS DIRECTED       Last Visit Date (If Applicable):  12/19/2024    Next Visit Date:    6/25/2025

## 2025-04-07 ENCOUNTER — TELEPHONE (OUTPATIENT)
Dept: FAMILY MEDICINE CLINIC | Age: 80
End: 2025-04-07

## 2025-04-07 ENCOUNTER — OFFICE VISIT (OUTPATIENT)
Dept: FAMILY MEDICINE CLINIC | Age: 80
End: 2025-04-07
Payer: MEDICARE

## 2025-04-07 VITALS
HEIGHT: 60 IN | HEART RATE: 117 BPM | OXYGEN SATURATION: 86 % | WEIGHT: 136 LBS | BODY MASS INDEX: 26.7 KG/M2 | DIASTOLIC BLOOD PRESSURE: 74 MMHG | SYSTOLIC BLOOD PRESSURE: 124 MMHG

## 2025-04-07 DIAGNOSIS — J44.9 CHRONIC OBSTRUCTIVE PULMONARY DISEASE, UNSPECIFIED COPD TYPE (HCC): ICD-10-CM

## 2025-04-07 DIAGNOSIS — R31.0 GROSS HEMATURIA: ICD-10-CM

## 2025-04-07 DIAGNOSIS — E11.69 TYPE 2 DIABETES MELLITUS WITH OTHER SPECIFIED COMPLICATION, WITH LONG-TERM CURRENT USE OF INSULIN: ICD-10-CM

## 2025-04-07 DIAGNOSIS — Z00.00 MEDICARE ANNUAL WELLNESS VISIT, SUBSEQUENT: Primary | ICD-10-CM

## 2025-04-07 DIAGNOSIS — I50.22 CHRONIC SYSTOLIC CONGESTIVE HEART FAILURE (HCC): ICD-10-CM

## 2025-04-07 DIAGNOSIS — Z79.4 TYPE 2 DIABETES MELLITUS WITH OTHER SPECIFIED COMPLICATION, WITH LONG-TERM CURRENT USE OF INSULIN: ICD-10-CM

## 2025-04-07 PROBLEM — E53.8 B12 DEFICIENCY: Status: ACTIVE | Noted: 2025-04-07

## 2025-04-07 PROCEDURE — 1159F MED LIST DOCD IN RCRD: CPT | Performed by: STUDENT IN AN ORGANIZED HEALTH CARE EDUCATION/TRAINING PROGRAM

## 2025-04-07 PROCEDURE — G0439 PPPS, SUBSEQ VISIT: HCPCS | Performed by: STUDENT IN AN ORGANIZED HEALTH CARE EDUCATION/TRAINING PROGRAM

## 2025-04-07 PROCEDURE — 1160F RVW MEDS BY RX/DR IN RCRD: CPT | Performed by: STUDENT IN AN ORGANIZED HEALTH CARE EDUCATION/TRAINING PROGRAM

## 2025-04-07 PROCEDURE — 1123F ACP DISCUSS/DSCN MKR DOCD: CPT | Performed by: STUDENT IN AN ORGANIZED HEALTH CARE EDUCATION/TRAINING PROGRAM

## 2025-04-07 SDOH — ECONOMIC STABILITY: FOOD INSECURITY: WITHIN THE PAST 12 MONTHS, THE FOOD YOU BOUGHT JUST DIDN'T LAST AND YOU DIDN'T HAVE MONEY TO GET MORE.: NEVER TRUE

## 2025-04-07 SDOH — ECONOMIC STABILITY: FOOD INSECURITY: WITHIN THE PAST 12 MONTHS, YOU WORRIED THAT YOUR FOOD WOULD RUN OUT BEFORE YOU GOT MONEY TO BUY MORE.: NEVER TRUE

## 2025-04-07 ASSESSMENT — PATIENT HEALTH QUESTIONNAIRE - PHQ9
SUM OF ALL RESPONSES TO PHQ QUESTIONS 1-9: 0
SUM OF ALL RESPONSES TO PHQ QUESTIONS 1-9: 0
2. FEELING DOWN, DEPRESSED OR HOPELESS: NOT AT ALL
1. LITTLE INTEREST OR PLEASURE IN DOING THINGS: NOT AT ALL
SUM OF ALL RESPONSES TO PHQ QUESTIONS 1-9: 0
SUM OF ALL RESPONSES TO PHQ QUESTIONS 1-9: 0

## 2025-04-07 NOTE — PATIENT INSTRUCTIONS
neck, shoulder, or low back pain. Stress is linked to high blood pressure and heart disease.  Stress also harms your emotional health. It can make you cordova, tense, or depressed. Your relationships may suffer, and you may not do well at work or school.  What can you do to manage stress?  You can try these things to help manage stress:   Do something active. Exercise or activity can help reduce stress. Walking is a great way to get started. Even everyday activities such as housecleaning or yard work can help.  Try yoga or kehinde chi. These techniques combine exercise and meditation. You may need some training at first to learn them.  Do something you enjoy. For example, listen to music or go to a movie. Practice your hobby or do volunteer work.  Meditate. This can help you relax, because you are not worrying about what happened before or what may happen in the future.  Do guided imagery. Imagine yourself in any setting that helps you feel calm. You can use online videos, books, or a teacher to guide you.  Do breathing exercises. For example:  From a standing position, bend forward from the waist with your knees slightly bent. Let your arms dangle close to the floor.  Breathe in slowly and deeply as you return to a standing position. Roll up slowly and lift your head last.  Hold your breath for just a few seconds in the standing position.  Breathe out slowly and bend forward from the waist.  Let your feelings out. Talk, laugh, cry, and express anger when you need to. Talking with supportive friends or family, a counselor, or a leroy leader about your feelings is a healthy way to relieve stress. Avoid discussing your feelings with people who make you feel worse.  Write. It may help to write about things that are bothering you. This helps you find out how much stress you feel and what is causing it. When you know this, you can find better ways to cope.  What can you do to prevent stress?  You might try some of these things

## 2025-04-07 NOTE — PROGRESS NOTES
Medicare Annual Wellness Visit    Daksha Smith is here for Medicare AWV    Assessment & Plan   Medicare annual wellness visit, subsequent  Gross hematuria  -     Culture, Urine; Future  Chronic systolic congestive heart failure (HCC)  Chronic obstructive pulmonary disease, unspecified COPD type (HCC)  Type 2 diabetes mellitus with other specified complication, with long-term current use of insulin     Return in 6 months (on 10/7/2025) for Follow up HTN.     Subjective   The following acute and/or chronic problems were also addressed today:  She is complaining of having blood in her urine for past couple of days.  She was admitted in hospital about a month ago with UTI and acute confusion.  She states that since then she has not had any episodes of confusion, she does have occasional memory lapses.  She has been doing well on her current medications.    Patient's complete Health Risk Assessment and screening values have been reviewed and are found in Flowsheets. The following problems were reviewed today and where indicated follow up appointments were made and/or referrals ordered.    Positive Risk Factor Screenings with Interventions:    Fall Risk:  Do you feel unsteady or are you worried about falling? : no  2 or more falls in past year?: (!) yes  Fall with injury in past year?: no     Interventions:    Reviewed medications, home hazards, visual acuity, and co-morbidities that can increase risk for falls            General HRA Questions:  Select all that apply: (!) Stress  Interventions - Stress:  Patient declined any further interventions or treatment      Inactivity:  On average, how many days per week do you engage in moderate to strenuous exercise (like a brisk walk)?: 0 days (!) Abnormal  On average, how many minutes do you engage in exercise at this level?: 0 min  Interventions:  Patient declined any further interventions or treatment        Vision Screen:  Visual Acuity screen is abnormal due to a score of

## 2025-04-08 ENCOUNTER — TELEPHONE (OUTPATIENT)
Dept: FAMILY MEDICINE CLINIC | Age: 80
End: 2025-04-08

## 2025-04-08 NOTE — TELEPHONE ENCOUNTER
Patient would like to know if something could be sent to the pharmacy until her results come back. States that her urine is very cloudy.

## 2025-04-09 DIAGNOSIS — R31.0 GROSS HEMATURIA: ICD-10-CM

## 2025-04-09 DIAGNOSIS — N30.01 ACUTE CYSTITIS WITH HEMATURIA: Primary | ICD-10-CM

## 2025-04-09 RX ORDER — CEPHALEXIN 500 MG/1
500 CAPSULE ORAL 2 TIMES DAILY
Qty: 10 CAPSULE | Refills: 0 | Status: SHIPPED | OUTPATIENT
Start: 2025-04-09 | End: 2025-04-14

## 2025-04-15 ENCOUNTER — TELEPHONE (OUTPATIENT)
Dept: FAMILY MEDICINE CLINIC | Age: 80
End: 2025-04-15

## 2025-04-15 DIAGNOSIS — A49.8 INFECTION DUE TO CITROBACTER: Primary | ICD-10-CM

## 2025-04-15 NOTE — TELEPHONE ENCOUNTER
Patient advised to complete the antibiotic and redo urine culture. Order faxed to [facility]

## 2025-04-15 NOTE — TELEPHONE ENCOUNTER
Please let her know that her urine culture is positive, would advise her to complete antibiotic course and repeat urine culture to check for clearance, placing order

## 2025-04-16 ENCOUNTER — TELEPHONE (OUTPATIENT)
Dept: FAMILY MEDICINE CLINIC | Age: 80
End: 2025-04-16

## 2025-04-16 NOTE — TELEPHONE ENCOUNTER
A49.8 will not be covered by the insurance. They changed the order to Gross hematuria to be covered . No additional order needed.

## 2025-04-17 DIAGNOSIS — A49.8 INFECTION DUE TO CITROBACTER: ICD-10-CM

## 2025-04-21 ENCOUNTER — RESULTS FOLLOW-UP (OUTPATIENT)
Dept: FAMILY MEDICINE CLINIC | Age: 80
End: 2025-04-21

## 2025-05-01 ENCOUNTER — TELEPHONE (OUTPATIENT)
Dept: FAMILY MEDICINE CLINIC | Age: 80
End: 2025-05-01

## 2025-05-01 DIAGNOSIS — R31.0 GROSS HEMATURIA: Primary | ICD-10-CM

## 2025-05-02 ENCOUNTER — HOSPITAL ENCOUNTER (OUTPATIENT)
Age: 80
Setting detail: SPECIMEN
Discharge: HOME | End: 2025-05-02

## 2025-05-02 DIAGNOSIS — R31.0 GROSS HEMATURIA: ICD-10-CM

## 2025-05-03 LAB
MICROORGANISM SPEC CULT: NORMAL
SERVICE CMNT-IMP: NORMAL
SPECIMEN DESCRIPTION: NORMAL

## 2025-05-05 ENCOUNTER — RESULTS FOLLOW-UP (OUTPATIENT)
Dept: FAMILY MEDICINE CLINIC | Age: 80
End: 2025-05-05

## 2025-06-16 PROBLEM — G93.9 DISORDER OF BRAIN: Status: ACTIVE | Noted: 2025-02-20

## 2025-06-16 PROBLEM — F05 DELIRIUM DUE TO KNOWN PHYSIOLOGICAL CONDITION: Status: ACTIVE | Noted: 2025-02-20

## 2025-06-16 PROBLEM — H25.813 COMBINED FORMS OF AGE-RELATED CATARACT OF BOTH EYES: Status: ACTIVE | Noted: 2024-03-20

## 2025-06-16 PROBLEM — G93.40 ENCEPHALOPATHY: Status: ACTIVE | Noted: 2025-02-20

## 2025-06-16 PROBLEM — H52.4 PRESBYOPIA: Status: ACTIVE | Noted: 2024-03-20

## 2025-06-19 PROBLEM — I34.0 NONRHEUMATIC MITRAL VALVE REGURGITATION: Status: ACTIVE | Noted: 2025-06-19

## 2025-06-19 PROBLEM — I51.9 LEFT VENTRICULAR DYSFUNCTION: Status: ACTIVE | Noted: 2025-06-19

## 2025-06-25 DIAGNOSIS — Z79.4 TYPE 2 DIABETES MELLITUS WITH OTHER SPECIFIED COMPLICATION, WITH LONG-TERM CURRENT USE OF INSULIN (HCC): ICD-10-CM

## 2025-06-25 DIAGNOSIS — E11.69 TYPE 2 DIABETES MELLITUS WITH OTHER SPECIFIED COMPLICATION, WITH LONG-TERM CURRENT USE OF INSULIN (HCC): ICD-10-CM

## 2025-06-25 RX ORDER — PEN NEEDLE, DIABETIC 31 GX5/16"
NEEDLE, DISPOSABLE MISCELLANEOUS
Qty: 100 EACH | Refills: 1 | Status: SHIPPED | OUTPATIENT
Start: 2025-06-25

## 2025-06-25 NOTE — TELEPHONE ENCOUNTER
Daksha Smith is calling to request a refill on the following medication(s):    Medication Request:  Requested Prescriptions     Pending Prescriptions Disp Refills    Insulin Pen Needle (B-D ULTRAFINE III SHORT PEN) 31G X 8 MM MISC 100 each 0     Sig: USE AS DIRECTED       Last Visit Date (If Applicable):  4/7/2025    Next Visit Date:    10/13/2025

## 2025-06-30 ENCOUNTER — TELEPHONE (OUTPATIENT)
Dept: FAMILY MEDICINE CLINIC | Age: 80
End: 2025-06-30

## 2025-06-30 NOTE — TELEPHONE ENCOUNTER
Our records indicate that Daksha Smith may benefit from medication optimization to meet the three pillars of CHF care (ACE/ARB/ARNI, Beta Blocker, and MRA therapy). The ask is to change the patient's medications below, as appropriate. If the patient is being followed by a cardiologist, please ensure they have a follow up appointment with that provider to discuss further.     The measure definition, denominator, inclusions, and exclusions are below:

## 2025-07-14 DIAGNOSIS — E11.69 TYPE 2 DIABETES MELLITUS WITH OTHER SPECIFIED COMPLICATION, WITH LONG-TERM CURRENT USE OF INSULIN (HCC): ICD-10-CM

## 2025-07-14 DIAGNOSIS — I25.10 CORONARY ARTERY DISEASE INVOLVING NATIVE CORONARY ARTERY OF NATIVE HEART WITHOUT ANGINA PECTORIS: ICD-10-CM

## 2025-07-14 DIAGNOSIS — Z79.4 TYPE 2 DIABETES MELLITUS WITH OTHER SPECIFIED COMPLICATION, WITH LONG-TERM CURRENT USE OF INSULIN (HCC): ICD-10-CM

## 2025-07-14 DIAGNOSIS — M85.80 OSTEOPENIA, UNSPECIFIED LOCATION: ICD-10-CM

## 2025-07-14 LAB
ALBUMIN: 4.2 G/DL
ALP BLD-CCNC: 44 U/L
ALT SERPL-CCNC: 10 U/L
AST SERPL-CCNC: 13 U/L
BASOPHILS ABSOLUTE: 0.1 /ΜL
BASOPHILS RELATIVE PERCENT: 1 %
BILIRUB SERPL-MCNC: 0.3 MG/DL (ref 0.1–1.4)
BUN BLDV-MCNC: 15 MG/DL
CALCIUM SERPL-MCNC: 8.7 MG/DL
CHLORIDE BLD-SCNC: 107 MMOL/L
CO2: 26 MMOL/L
CREAT SERPL-MCNC: 0.94 MG/DL
EOSINOPHILS ABSOLUTE: 0.1 /ΜL
EOSINOPHILS RELATIVE PERCENT: 2 %
ESTIMATED AVERAGE GLUCOSE: 169
GFR, ESTIMATED: >60
GLUCOSE FASTING: 99 MG/DL
HBA1C MFR BLD: 7.5 %
HCT VFR BLD CALC: 35.9 % (ref 36–46)
HEMOGLOBIN: 11.4 G/DL (ref 12–16)
LYMPHOCYTES ABSOLUTE: 2.4 /ΜL
LYMPHOCYTES RELATIVE PERCENT: 36 %
MCH RBC QN AUTO: 30.1 PG
MCHC RBC AUTO-ENTMCNC: 31.8 G/DL
MCV RBC AUTO: 95 FL
MONOCYTES ABSOLUTE: 0.6 /ΜL
MONOCYTES RELATIVE PERCENT: 10 %
NEUTROPHILS ABSOLUTE: 3.3 /ΜL
NEUTROPHILS RELATIVE PERCENT: 51 %
PDW BLD-RTO: 14.6 %
PLATELET # BLD: 218 K/ΜL
PMV BLD AUTO: 9.9 FL
POTASSIUM SERPL-SCNC: 4.2 MMOL/L
RBC # BLD: 3.78 10^6/ΜL
SODIUM BLD-SCNC: 140 MMOL/L
TOTAL PROTEIN: 6.7 G/DL (ref 6.4–8.2)
VITAMIN D 25-HYDROXY: 40.2
VITAMIN D2, 25 HYDROXY: NORMAL
VITAMIN D3,25 HYDROXY: NORMAL
WBC # BLD: 6.6 10^3/ML

## 2025-08-25 DIAGNOSIS — E11.69 TYPE 2 DIABETES MELLITUS WITH OTHER SPECIFIED COMPLICATION, WITH LONG-TERM CURRENT USE OF INSULIN (HCC): ICD-10-CM

## 2025-08-25 DIAGNOSIS — Z79.4 TYPE 2 DIABETES MELLITUS WITH OTHER SPECIFIED COMPLICATION, WITH LONG-TERM CURRENT USE OF INSULIN (HCC): ICD-10-CM

## 2025-08-25 RX ORDER — INSULIN GLARGINE 300 U/ML
26 INJECTION, SOLUTION SUBCUTANEOUS DAILY
Qty: 3 ADJUSTABLE DOSE PRE-FILLED PEN SYRINGE | Refills: 1 | Status: SHIPPED | OUTPATIENT
Start: 2025-08-25